# Patient Record
Sex: MALE | Race: WHITE | Employment: OTHER | URBAN - METROPOLITAN AREA
[De-identification: names, ages, dates, MRNs, and addresses within clinical notes are randomized per-mention and may not be internally consistent; named-entity substitution may affect disease eponyms.]

---

## 2017-03-09 ENCOUNTER — HOSPITAL ENCOUNTER (EMERGENCY)
Facility: CLINIC | Age: 82
Discharge: HOME | End: 2017-03-09
Attending: EMERGENCY MEDICINE | Admitting: EMERGENCY MEDICINE
Payer: MEDICARE

## 2017-03-09 VITALS
OXYGEN SATURATION: 95 % | BODY MASS INDEX: 28.85 KG/M2 | TEMPERATURE: 97 F | SYSTOLIC BLOOD PRESSURE: 135 MMHG | WEIGHT: 198.19 LBS | DIASTOLIC BLOOD PRESSURE: 80 MMHG | RESPIRATION RATE: 20 BRPM | HEART RATE: 80 BPM

## 2017-03-09 DIAGNOSIS — J01.90 ACUTE SINUSITIS WITH SYMPTOMS > 10 DAYS: ICD-10-CM

## 2017-03-09 DIAGNOSIS — J30.2 SEASONAL ALLERGIC RHINITIS, UNSPECIFIED ALLERGIC RHINITIS TRIGGER: ICD-10-CM

## 2017-03-09 PROCEDURE — 99283 EMERGENCY DEPT VISIT LOW MDM: CPT | Performed by: EMERGENCY MEDICINE

## 2017-03-09 PROCEDURE — 99282 EMERGENCY DEPT VISIT SF MDM: CPT

## 2017-03-09 RX ORDER — CETIRIZINE HYDROCHLORIDE 10 MG/1
10 TABLET ORAL EVERY EVENING
Qty: 30 TABLET | Refills: 1 | Status: SHIPPED | OUTPATIENT
Start: 2017-03-09 | End: 2017-05-04

## 2017-03-09 RX ORDER — AZITHROMYCIN 250 MG/1
TABLET, FILM COATED ORAL
Qty: 6 TABLET | Refills: 0 | Status: SHIPPED | OUTPATIENT
Start: 2017-03-09 | End: 2017-03-14

## 2017-03-09 ASSESSMENT — ENCOUNTER SYMPTOMS
COUGH: 1
SHORTNESS OF BREATH: 0
SINUS PRESSURE: 1

## 2017-03-09 NOTE — ED PROVIDER NOTES
"  History     Chief Complaint   Patient presents with     Cough     The history is provided by the patient.     Jerry Rodriguez is a 82 year old male who presents for evaluation of a cough, chills, runny nose, and shortness of breath that have been ongoing for the last several weeks.  Patient is had a cough is present producing clear discharge.  He says at times he feels \"wheezy\".  He has been having chills but is unsure what his temperatures actually been running.    I have reviewed the Medications, Allergies, Past Medical and Surgical History, and Social History in the [facility] system.    Past Medical History   Diagnosis Date     Anxiety state, unspecified      Hypertension      Hyponatremia      Inguinal hernia with obstruction, without mention of gangrene, unilateral or unspecified, (not specified as recurrent)      Strangulated right inguinal hernia years ago     Inguinal hernia without mention of obstruction or gangrene, unilateral or unspecified, (not specified as recurrent) 11/07/2000     Left inguinal hernia, sliding and not incarcerated     Other joint derangement, not elsewhere classified, forearm 1980     traumatic fracture     Pathologic fracture of neck of femur (H)      Surgery for hip fracture     Stricture and stenosis of esophagus 2004       Past Surgical History   Procedure Laterality Date     Hc repair incisional hernia,reducible  11/10/2000     Hernia Repair, Incisional, Unilateral, Left inguinal hernia and umbilical hernia     Hc colonoscopy thru stoma, diagnostic  11/15/2000     Recurrent abdominal pain     C osteotomy hip/femur  1980     traumatic fracture right hip     Hc colonoscopy w/wo brush/wash  07/19/2004     Hc ugi endoscopy diag w or w/o brush/wash  07/19/2004     Hc ugi endoscopy diag w or w/o brush/wash  05/24/2005     Peptic stricture of esophagus, dilated to 18 mm. Erosive reflux esophagitis. Hiatal hernia. Erosive duodenitis.     Laparoscopic appendectomy  09/15/2005     Hc ugi " endoscopy, simple exam  02/20/07     Colonoscopy  10/17/2007     Repeat Colonoscopy in 5 years for surveillance.      ugi endoscopy, simple exam  12/02/08     Schiatzky's ring, dialated, PPI indefinitely     Hc ugi endoscopy, simple exam  12/15/2009     benign stricture with dialation     Esophagoscopy, gastroscopy, duodenoscopy (egd), combined  1/4/2011     COMBINED ESOPHAGOSCOPY, GASTROSCOPY, DUODENOSCOPY (EGD), ESOPHAGEAL DILATION BALLOON LESS THAN 30MM performed by ELEN BUSBY at  GI     Esophagoscopy, gastroscopy, duodenoscopy (egd), combined N/A 9/25/2015     Procedure: COMBINED ESOPHAGOSCOPY, GASTROSCOPY, DUODENOSCOPY (EGD), BIOPSY SINGLE OR MULTIPLE;  Surgeon: Markell Lawson MD;  Location:  GI       Family History   Problem Relation Age of Onset     CANCER Sister      brain tumor     DIABETES Sister      Alzheimer Disease Sister        Social History   Substance Use Topics     Smoking status: Current Every Day Smoker     Packs/day: 1.00     Years: 52.00     Types: Cigarettes     Smokeless tobacco: Current User     Types: Chew      Comment: occasionally/ 1.5tin qwk     Alcohol use No      Comment: quit 7-1980        Immunization History   Administered Date(s) Administered     Influenza (H1N1) 01/11/2010     Influenza (High Dose) 3 valent vaccine 10/14/2011, 09/04/2015, 10/13/2016     Influenza (IIV3) 11/25/2000, 10/31/2001, 11/04/2002, 01/31/2005, 10/17/2005, 11/13/2006, 10/18/2007, 10/16/2008, 09/22/2009, 10/19/2010     Pneumococcal (PCV 13) 10/13/2016     Pneumococcal 23 valent 11/01/2001, 10/17/2005     TD (ADULT, 7+) 10/28/1999, 10/25/2000, 02/04/2010          Allergies   Allergen Reactions     No Known Drug Allergies        Current Outpatient Prescriptions   Medication Sig Dispense Refill     Ibuprofen (ADVIL PO) Take 400 mg by mouth       azithromycin (ZITHROMAX Z-LOUISE) 250 MG tablet Two tablets on the first day, then one tablet daily for the next 4 days 6 tablet 0     cetirizine (ZYRTEC) 10  MG tablet Take 1 tablet (10 mg) by mouth every evening 30 tablet 1     albuterol (PROAIR HFA, PROVENTIL HFA, VENTOLIN HFA) 108 (90 BASE) MCG/ACT inhaler Inhale 2 puffs into the lungs every 6 hours as needed for shortness of breath / dyspnea or wheezing 1 Inhaler 1     omeprazole (PRILOSEC) 20 MG capsule Take 1 capsule (20 mg) by mouth daily 90 capsule 3     busPIRone (BUSPAR) 5 MG tablet Take 1 tablet (5 mg) by mouth 2 times daily 180 tablet 1     amLODIPine (NORVASC) 5 MG tablet Take 1 tablet (5 mg) by mouth daily 90 tablet 3     multivitamin, therapeutic with minerals (MULTI-VITAMIN) TABS Take 1 tablet by mouth daily       meclizine (ANTIVERT) 25 MG tablet Take 1 tablet (25 mg) by mouth 3 times daily as needed for dizziness 30 tablet 12     TIMOLOL 0.25 % OP SOLN as dir  0     ASPIRIN 81 MG OR TABS ONE DAILY       triamcinolone (KENALOG) 0.1 % cream Apply thin layer to affected areas two times a day as needed for itch. Maximum 2 weeks use 80 g 0      Review of Systems   HENT: Positive for congestion, postnasal drip and sinus pressure.    Respiratory: Positive for cough. Negative for shortness of breath.    All other systems reviewed and are negative.      Physical Exam   BP: 135/80  Pulse: 81  Temp: 97  F (36.1  C)  Resp: 18  Weight: 89.9 kg (198 lb 3 oz)  SpO2: 95 %  Physical Exam   Constitutional: No distress.   HENT:   Head: Atraumatic.   Nose: Mucosal edema and rhinorrhea present. Right sinus exhibits maxillary sinus tenderness. Left sinus exhibits maxillary sinus tenderness.   Mouth/Throat: Oropharynx is clear and moist.       Eyes: Pupils are equal, round, and reactive to light.   Neck: Normal range of motion. Neck supple.   Cardiovascular: Normal rate, regular rhythm and intact distal pulses.    Murmur heard.   Systolic murmur is present with a grade of 2/6   Pulmonary/Chest: Effort normal and breath sounds normal.   Abdominal: Soft. Bowel sounds are normal.   Skin: He is not diaphoretic.   Nursing note and  vitals reviewed.      ED Course     ED Course     Procedures                 Labs Ordered and Resulted from Time of ED Arrival Up to the Time of Departure from the ED - No data to display    Assessments & Plan (with Medical Decision Making)  Jerry Rodriguez is a 82 year old male who presents for evaluation of a cough, chills, runny nose, and shortness of breath that have been ongoing for the last several weeks.  On examination he has significant edema of the turbinates with copious clear discharge.  He has maxillary sinus tenderness to percussion.  He has a 2/6 systolic ejection murmur.  The remainder of his exam is unremarkable.  These findings are consistent with an acute sinusitis greater than 10 days and allergic rhinitis.  For the sinusitis sinusitis will start him on azithromycin Z-Louise to clear the infection and for the allergic rhinitis we'll start him on cetirizine daily.  Patient is in agreement with this plan and is suitable for discharge.       I have reviewed the nursing notes.    I have reviewed the findings, diagnosis, plan and need for follow up with the patient.    New Prescriptions    AZITHROMYCIN (ZITHROMAX Z-LOUISE) 250 MG TABLET    Two tablets on the first day, then one tablet daily for the next 4 days    CETIRIZINE (ZYRTEC) 10 MG TABLET    Take 1 tablet (10 mg) by mouth every evening       Final diagnoses:   Acute sinusitis with symptoms > 10 days   Seasonal allergic rhinitis, unspecified allergic rhinitis trigger       3/9/2017   [facility] EMERGENCY DEPARTMENT     Don Mcneill MD  03/09/17 [id number]

## 2017-03-09 NOTE — ED AVS SNAPSHOT
[facility] Emergency Department    [address] [place] [address]    Phone:  [phone]    Fax:  [phone]                                       Jerry Rodriguez   MRN: [id number]    Department:  [facility] Emergency Department   Date of Visit:  3/9/2017           After Visit Summary Signature Page     I have received my discharge instructions, and my questions have been answered. I have discussed any challenges I see with this plan with the nurse or doctor.    ..........................................................................................................................................  Patient/Patient Representative Signature      ..........................................................................................................................................  Patient Representative Print Name and Relationship to Patient    ..................................................               ................................................  Date                                            Time    ..........................................................................................................................................  Reviewed by Signature/Title    ...................................................              ..............................................  Date                                                            Time

## 2017-03-09 NOTE — ED AVS SNAPSHOT
[facility] Emergency Department    [address]     [place] [address]    Phone:  [phone]    Fax:  [phone]                                       Jerry Rodriguez   MRN: [id number]    Department:  [facility] Emergency Department   Date of Visit:  3/9/2017           Patient Information     Date Of Birth          1935        Your diagnoses for this visit were:     Acute sinusitis with symptoms > 10 days     Seasonal allergic rhinitis, unspecified allergic rhinitis trigger        You were seen by Don Mcneill MD.      Follow-up Information     Follow up with Roberta Beaver MD.    Specialty:  Family Practice    Why:  As needed    Contact information:    [facility]   [address] [address]  [phone]          Discharge Instructions         Acute Bacterial Rhinosinusitis (ABRS)  Acute bacterial rhinosinusitis (ABRS) is an infection of your nasal cavity and sinuses. It s caused by bacteria. Acute means that you ve had symptoms for less than 12 weeks.  Understanding your sinuses  The nasal cavity is the large air-filled space behind your nose. The sinuses are a group of spaces formed by the bones of your face. They connect with your nasal cavity. ABRS causes the tissue lining these spaces to become inflamed. Mucus may not drain normally. This leads to facial pain and other symptoms.  What causes ABRS?  ABRS most often follows an upper respiratory infection caused by a virus. Bacteria then infect the lining of your nasal cavity and sinuses. But you can also get ABRS if you have:    Nasal allergies    Long-term nasal swelling and congestion not caused by allergies    Blockage in the nose  Symptoms of ABRS  The symptoms of ABRS may be different for each person, and can include:    Nasal congestion    Runny nose    Fluid draining from the nose down the throat (postnasal drip)    Headache    Cough    Pain in the sinuses    Thick, colored fluid  from the nose (mucus)    Fever  Diagnosing ABRS  ABRS may be diagnosed if you ve had an upper respiratory infection like a cold and cough for longer than 10 to 14 days. Your health care provider will ask about your symptoms and your medical history. The provider will check your vital signs, including your temperature. You ll have a physical exam. The health care provider will check your ears, nose, and throat. You likely won t need any tests. If ABRS comes back, you may have a culture or other tests.  Treatment for ABRS  Treatment may include:    Antibiotic medicine. This is for symptoms that last for at least 10 to 14 days.    Nasal corticosteroid medicine. Drops or spray used in the nose can lessen swelling and congestion.    Over-the-counter pain medicine. This is to lessen sinus pain and pressure.    Nasal decongestant medicine. Spray or drops may help to lessen congestion. Do not use them for more than a few days.    Salt wash (saline irrigation). This can help to loosen mucus.  Possible complications of ABRS  ABRS may come back or become long-term (chronic).  In rare cases, ABRS may cause complications such as:     Inflamed tissue around the brain and spinal cord (meningitis)    Inflamed tissue around the eyes (orbital cellulitis)    Inflamed bones around the sinuses (osteitis)  These problems may need to be treated in a hospital with intravenous (IV) antibiotic medicine or surgery.  When to call the health care provider  Call your health care provider if you have any of the following:    Symptoms that don t get better, or get worse    Symptoms that don t get better after 3 to 5 days on antibiotics    Trouble seeing    Swelling around your eyes    Confusion or trouble staying awake        4075-5372 [employer]. [address] [place] [address], [place] [address]. All rights reserved. This information is not intended as a substitute for professional medical care. Always follow your healthcare professional's  instructions.          24 Hour Appointment Hotline       To make an appointment at any [facility], call [phone] ([phone]). If you don't have a family doctor or clinic, we will help you find one. [facility] clinics are conveniently located to serve the needs of you and your family.             Review of your medicines      START taking        Dose / Directions Last dose taken    azithromycin 250 MG tablet   Commonly known as:  ZITHROMAX Z-LOUISE   Quantity:  6 tablet        Two tablets on the first day, then one tablet daily for the next 4 days   Refills:  0        cetirizine 10 MG tablet   Commonly known as:  zyrTEC   Dose:  10 mg   Quantity:  30 tablet        Take 1 tablet (10 mg) by mouth every evening   Refills:  1          Our records show that you are taking the medicines listed below. If these are incorrect, please call your family doctor or clinic.        Dose / Directions Last dose taken    ADVIL PO   Dose:  400 mg        Take 400 mg by mouth   Refills:  0        albuterol 108 (90 BASE) MCG/ACT Inhaler   Commonly known as:  PROAIR HFA/PROVENTIL HFA/VENTOLIN HFA   Dose:  2 puff   Quantity:  1 Inhaler        Inhale 2 puffs into the lungs every 6 hours as needed for shortness of breath / dyspnea or wheezing   Refills:  1        amLODIPine 5 MG tablet   Commonly known as:  NORVASC   Dose:  5 mg   Quantity:  90 tablet        Take 1 tablet (5 mg) by mouth daily   Refills:  3        aspirin 81 MG tablet        ONE DAILY   Refills:  0        busPIRone 5 MG tablet   Commonly known as:  BUSPAR   Dose:  5 mg   Quantity:  180 tablet        Take 1 tablet (5 mg) by mouth 2 times daily   Refills:  1        meclizine 25 MG tablet   Commonly known as:  ANTIVERT   Dose:  25 mg   Quantity:  30 tablet        Take 1 tablet (25 mg) by mouth 3 times daily as needed for dizziness   Refills:  12        Multi-vitamin Tabs tablet   Dose:  1 tablet        Take 1 tablet by mouth daily   Refills:  0        omeprazole 20 MG  "CR capsule   Commonly known as:  priLOSEC   Dose:  20 mg   Quantity:  90 capsule        Take 1 capsule (20 mg) by mouth daily   Refills:  3        timolol hemihydrate 0.25 % Soln ophthalmic solution   Commonly known as:  BETIMOL        as dir   Refills:  0        triamcinolone 0.1 % cream   Commonly known as:  KENALOG   Quantity:  80 g        Apply thin layer to affected areas two times a day as needed for itch. Maximum 2 weeks use   Refills:  0                Prescriptions were sent or printed at these locations (2 Prescriptions)                   [facility] [address] [place], [place] [address] [address] [address]    Telephone:  [phone]   Fax:  [phone]   Hours:                  E-Prescribed (2 of 2)         azithromycin (ZITHROMAX Z-LOUISE) 250 MG tablet               cetirizine (ZYRTEC) 10 MG tablet                Orders Needing Specimen Collection     None      Pending Results     No orders found from 3/7/2017 to 3/10/2017.            Pending Culture Results     No orders found from 3/7/2017 to 3/10/2017.            Thank you for choosing [facility]       Thank you for choosing [facility] for your care. Our goal is always to provide you with excellent care. Hearing back from our patients is one way we can continue to improve our services. Please take a few minutes to complete the written survey that you may receive in the mail after you visit with us. Thank you!        [employer] Information     [employer] lets you send messages to your doctor, view your test results, renew your prescriptions, schedule appointments and more. To sign up, go to [email or web address] . Click on \"Log in\" on the left side of the screen, which will take you to the Welcome page. Then click on \"Sign up Now\" on the right side of the page.     You will be asked to enter the access code listed below, as well as some personal information. Please follow the directions to create your username and password.   "   Your access code is: [email or web address]  Expires: 2017 10:29 AM     Your access code will  in 90 days. If you need help or a new code, please call your [facility] clinic or [phone].        Care EveryWhere ID     This is your Care EveryWhere ID. This could be used by other organizations to access your [facility] medical records  [id number]        After Visit Summary       This is your record. Keep this with you and show to your community pharmacist(s) and doctor(s) at your next visit.

## 2017-03-09 NOTE — DISCHARGE INSTRUCTIONS
Acute Bacterial Rhinosinusitis (ABRS)  Acute bacterial rhinosinusitis (ABRS) is an infection of your nasal cavity and sinuses. It s caused by bacteria. Acute means that you ve had symptoms for less than 12 weeks.  Understanding your sinuses  The nasal cavity is the large air-filled space behind your nose. The sinuses are a group of spaces formed by the bones of your face. They connect with your nasal cavity. ABRS causes the tissue lining these spaces to become inflamed. Mucus may not drain normally. This leads to facial pain and other symptoms.  What causes ABRS?  ABRS most often follows an upper respiratory infection caused by a virus. Bacteria then infect the lining of your nasal cavity and sinuses. But you can also get ABRS if you have:    Nasal allergies    Long-term nasal swelling and congestion not caused by allergies    Blockage in the nose  Symptoms of ABRS  The symptoms of ABRS may be different for each person, and can include:    Nasal congestion    Runny nose    Fluid draining from the nose down the throat (postnasal drip)    Headache    Cough    Pain in the sinuses    Thick, colored fluid from the nose (mucus)    Fever  Diagnosing ABRS  ABRS may be diagnosed if you ve had an upper respiratory infection like a cold and cough for longer than 10 to 14 days. Your health care provider will ask about your symptoms and your medical history. The provider will check your vital signs, including your temperature. You ll have a physical exam. The health care provider will check your ears, nose, and throat. You likely won t need any tests. If ABRS comes back, you may have a culture or other tests.  Treatment for ABRS  Treatment may include:    Antibiotic medicine. This is for symptoms that last for at least 10 to 14 days.    Nasal corticosteroid medicine. Drops or spray used in the nose can lessen swelling and congestion.    Over-the-counter pain medicine. This is to lessen sinus pain and pressure.    Nasal  decongestant medicine. Spray or drops may help to lessen congestion. Do not use them for more than a few days.    Salt wash (saline irrigation). This can help to loosen mucus.  Possible complications of ABRS  ABRS may come back or become long-term (chronic).  In rare cases, ABRS may cause complications such as:     Inflamed tissue around the brain and spinal cord (meningitis)    Inflamed tissue around the eyes (orbital cellulitis)    Inflamed bones around the sinuses (osteitis)  These problems may need to be treated in a hospital with intravenous (IV) antibiotic medicine or surgery.  When to call the health care provider  Call your health care provider if you have any of the following:    Symptoms that don t get better, or get worse    Symptoms that don t get better after 3 to 5 days on antibiotics    Trouble seeing    Swelling around your eyes    Confusion or trouble staying awake        [id number] [employer]. [address] [place] [address]. All rights reserved. This information is not intended as a substitute for professional medical care. Always follow your healthcare professional's instructions.

## 2017-03-16 DIAGNOSIS — R05.9 COUGH: ICD-10-CM

## 2017-03-16 RX ORDER — ALBUTEROL SULFATE 90 UG/1
2 AEROSOL, METERED RESPIRATORY (INHALATION) EVERY 6 HOURS PRN
Qty: 18 G | Refills: 0 | Status: SHIPPED | OUTPATIENT
Start: 2017-03-16 | End: 2017-05-05

## 2017-03-16 NOTE — TELEPHONE ENCOUNTER
Routing refill request to provider for review/approval because:  Drug not on the [facility] refill protocol for associated diagnosis.   Patient seen in ED 03/09/2017, should be seen for hospital follow up.     Mary Hook RN

## 2017-03-16 NOTE — TELEPHONE ENCOUNTER
Ventolin 108 mcg       Last Written Prescription Date: 11/30/2016  Last Fill Quantity: 1, # refills: 1    Last Office Visit with G, P or [facility] prescribing provider:  11/30/2016   Future Office Visit:       Date of Last Asthma Action Plan Letter:   There are no preventive care reminders to display for this patient.   Asthma Control Test: No flowsheet data found.    Date of Last Spirometry Test:   No results found for this or any previous visit.

## 2017-03-16 NOTE — TELEPHONE ENCOUNTER
Pt wants this called in ASAP. He would like to pick this up ASAP.  Thank you,  Kennedi Bronson- Pt Rep.

## 2017-03-27 NOTE — PROGRESS NOTES
SUBJECTIVE:                                                    Jerry Rodriguez is a 82 year old male who presents to clinic today for the following health issues:      HPI    GERD/Heartburn     Onset: 2 months    Description:     Burning in chest: no     Intensity: mild    Progression of Symptoms: same and intermittent    Accompanying Signs & Symptoms:  Does it feel like food gets stuck: YES  Nausea: no  Vomiting (bloody?): no  Abdominal Pain: YES  Black-Tarry stools: no:  Bloody stools: no   History:   Previous ulcers: no    Precipitating factors:   Caffeine use: YES  Alcohol use: no  NSAID/Aspirin use: YES  Tobacco use: no  Worse with no particular food or drink.    Alleviating factors:  nothing         Therapies Tried and outcome:none    GI: The patient reports pain in his abdominal and chest region for the past 2 months thinking it is related to his GERD. He reports pain only when he is lifting. Bothering only when he lifts. Denies any blood or changes in stools.    Problem list and histories reviewed & adjusted, as indicated.  Additional history: as documented    Patient Active Problem List   Diagnosis     Other atopic dermatitis and related conditions     Chronic rhinitis     Generalized osteoarthrosis, unspecified site     Primary localized osteoarthrosis of shoulder region     Stricture and stenosis of esophagus     Anxiety state     HTN (hypertension)     Tobacco use disorder     CARDIOVASCULAR SCREENING; LDL GOAL LESS THAN 130     Hyponatremia     Degenerative arthritis of finger     Advanced directives, counseling/discussion     Hammer toe     Corn     NOEMI positive     Bullous dermatitis     HTN, goal below 140/90     Vertigo     Adenomatous polyp of colon     Lumbosacral neuritis - moderate at L4/5     Degeneration of lumbar intervertebral disc     Past Surgical History:   Procedure Laterality Date     C OSTEOTOMY HIP/FEMUR  1980    traumatic fracture right hip     COLONOSCOPY  10/17/2007    Repeat  Colonoscopy in 5 years for surveillance.     ESOPHAGOSCOPY, GASTROSCOPY, DUODENOSCOPY (EGD), COMBINED  1/4/2011    COMBINED ESOPHAGOSCOPY, GASTROSCOPY, DUODENOSCOPY (EGD), ESOPHAGEAL DILATION BALLOON LESS THAN 30MM performed by ELEN BUSBY at  GI     ESOPHAGOSCOPY, GASTROSCOPY, DUODENOSCOPY (EGD), COMBINED N/A 9/25/2015    Procedure: COMBINED ESOPHAGOSCOPY, GASTROSCOPY, DUODENOSCOPY (EGD), BIOPSY SINGLE OR MULTIPLE;  Surgeon: Markell Lawson MD;  Location:  GI     HC COLONOSCOPY THRU STOMA, DIAGNOSTIC  11/15/2000    Recurrent abdominal pain     HC COLONOSCOPY W/WO BRUSH/WASH  07/19/2004     HC REPAIR INCISIONAL HERNIA,REDUCIBLE  11/10/2000    Hernia Repair, Incisional, Unilateral, Left inguinal hernia and umbilical hernia     HC UGI ENDOSCOPY DIAG W OR W/O BRUSH/WASH  07/19/2004     HC UGI ENDOSCOPY DIAG W OR W/O BRUSH/WASH  05/24/2005    Peptic stricture of esophagus, dilated to 18 mm. Erosive reflux esophagitis. Hiatal hernia. Erosive duodenitis.      UGI ENDOSCOPY, SIMPLE EXAM  02/20/07     HC UGI ENDOSCOPY, SIMPLE EXAM  12/02/08    Schiatzky's ring, dialated, PPI indefinitely     HC UGI ENDOSCOPY, SIMPLE EXAM  12/15/2009    benign stricture with dialation     LAPAROSCOPIC APPENDECTOMY  09/15/2005       Social History   Substance Use Topics     Smoking status: Current Every Day Smoker     Packs/day: 1.00     Years: 52.00     Types: Cigarettes     Smokeless tobacco: Current User     Types: Chew      Comment: occasionally/ 1.5tin qwk     Alcohol use No      Comment: quit 7-1980     Family History   Problem Relation Age of Onset     CANCER Sister      brain tumor     DIABETES Sister      Alzheimer Disease Sister            ROS:  Constitutional, HEENT, cardiovascular, pulmonary, GI, , musculoskeletal, neuro, skin, endocrine and psych systems are negative, except as in HPI or otherwise noted     This document serves as a record of the services and decisions personally performed and made by Roberta Beaver MD.  "It was created on her behalf by Ihsan Canas , a trained medical scribe. The creation of this document is based the provider's statements to the medical scribe.  Ihsan Canas, March 29, 2017 11:32 AM     OBJECTIVE:                                                    /76 (BP Location: Right arm, Patient Position: Chair, Cuff Size: Adult Regular)  Pulse 74  Temp 98.4  F (36.9  C) (Temporal)  Resp 18  Ht 5' 9.5\" (1.765 m)  Wt 199 lb (90.3 kg)  SpO2 98%  BMI 28.97 kg/m2  Body mass index is 28.97 kg/(m^2).   GENERAL: healthy, alert, well nourished, well hydrated, no distress  ABDOMEN: soft, no tenderness, no  hepatosplenomegaly, no masses, normal bowel sounds  MS: abdominal pain during muscle tensing  SKIN: no suspicious lesions, no rashes  PSYCH: Alert and oriented times 3; speech- coherent , normal rate and volume; able to articulate logical thoughts, able to abstract reason, no tangential thoughts, no hallucinations or delusions, affect- normal    No results found for this or any previous visit (from the past 24 hour(s)).     ASSESSMENT/PLAN:                                                        ICD-10-CM    1. Musculoskeletal chest pain R07.89    2. Essential hypertension I10 amLODIPine (NORVASC) 5 MG tablet   3. Anxiety state F41.1 busPIRone (BUSPAR) 5 MG tablet     Offered PT if he desires, but doing work around the yard by choice and will back off and do as he tolerates.  Declines intervention today.      Patient Instructions   -Your pain is likely due to your muscles being overused. Physical therapy and physical activity can help reduce your symptoms. Rest can also help alleviate your pain.    The information in this document, created by the medical scribe for me, accurately reflects the services I personally performed and the decisions made by me. I have reviewed and approved this document for accuracy.   MD Roberta Burciaga MD, MD  [facility]  "

## 2017-03-29 ENCOUNTER — OFFICE VISIT (OUTPATIENT)
Dept: FAMILY MEDICINE | Facility: OTHER | Age: 82
End: 2017-03-29
Payer: COMMERCIAL

## 2017-03-29 VITALS
DIASTOLIC BLOOD PRESSURE: 76 MMHG | HEIGHT: 70 IN | HEART RATE: 74 BPM | TEMPERATURE: 98.4 F | BODY MASS INDEX: 28.49 KG/M2 | WEIGHT: 199 LBS | SYSTOLIC BLOOD PRESSURE: 118 MMHG | RESPIRATION RATE: 18 BRPM | OXYGEN SATURATION: 98 %

## 2017-03-29 DIAGNOSIS — F41.1 ANXIETY STATE: ICD-10-CM

## 2017-03-29 DIAGNOSIS — I10 ESSENTIAL HYPERTENSION: ICD-10-CM

## 2017-03-29 DIAGNOSIS — R07.89 MUSCULOSKELETAL CHEST PAIN: Primary | ICD-10-CM

## 2017-03-29 PROCEDURE — 99214 OFFICE O/P EST MOD 30 MIN: CPT | Performed by: FAMILY MEDICINE

## 2017-03-29 RX ORDER — TIMOLOL MALEATE 5 MG/ML
1 SOLUTION/ DROPS OPHTHALMIC 2 TIMES DAILY
Status: ON HOLD | COMMUNITY
Start: 2017-03-25 | End: 2020-05-23

## 2017-03-29 RX ORDER — AMLODIPINE BESYLATE 5 MG/1
5 TABLET ORAL DAILY
Qty: 90 TABLET | Refills: 3 | Status: SHIPPED | OUTPATIENT
Start: 2017-03-29 | End: 2018-05-26

## 2017-03-29 RX ORDER — BUSPIRONE HYDROCHLORIDE 5 MG/1
5 TABLET ORAL 2 TIMES DAILY
Qty: 180 TABLET | Refills: 1 | Status: SHIPPED | OUTPATIENT
Start: 2017-03-29 | End: 2017-10-09

## 2017-03-29 ASSESSMENT — PAIN SCALES - GENERAL: PAINLEVEL: NO PAIN (0)

## 2017-03-29 NOTE — PATIENT INSTRUCTIONS
-Your pain is likely due to your muscles being overused. Physical therapy and physical activity can help reduce your symptoms. Rest can also help alleviate your pain.

## 2017-03-29 NOTE — MR AVS SNAPSHOT
"              After Visit Summary   3/29/2017    Jerry Rodriguez    MRN: [id number]           Patient Information     Date Of Birth          1935        Visit Information        Provider Department      3/29/2017 11:00 AM Roberta Beaver MD [facility]        Today's Diagnoses     Musculoskeletal chest pain    -  1    Essential hypertension        Anxiety state          Care Instructions    -Your pain is likely due to your muscles being overused. Physical therapy and physical activity can help reduce your symptoms. Rest can also help alleviate your pain.        Follow-ups after your visit        Who to contact     If you have questions or need follow up information about today's clinic visit or your schedule please contact [facility] directly at [phone].  Normal or non-critical lab and imaging results will be communicated to you by Melodigramhart, letter or phone within 4 business days after the clinic has received the results. If you do not hear from us within 7 days, please contact the clinic through Melodigramhart or phone. If you have a critical or abnormal lab result, we will notify you by phone as soon as possible.  Submit refill requests through FatRedC[employer] or call your pharmacy and they will forward the refill request to us. Please allow 3 business days for your refill to be completed.          Additional Information About Your Visit        MyChart Information     FatRedCouch lets you send messages to your doctor, view your test results, renew your prescriptions, schedule appointments and more. To sign up, go to [email or web address] . Click on \"Log in\" on the left side of the screen, which will take you to the Welcome page. Then click on \"Sign up Now\" on the right side of the page.     You will be asked to enter the access code listed below, as well as some personal information. Please follow the directions to create your username and password.     Your access code is: " [email or web address]  Expires: 2017 11:29 AM     Your access code will  in 90 days. If you need help or a new code, please call your [place] clinic or [phone].        Care EveryWhere ID     This is your Care EveryWhere ID. This could be used by other organizations to access your [place] medical records  [id number]        Your Vitals Were     Pulse Temperature Respirations Height Pulse Oximetry BMI (Body Mass Index)    74 98.4  F (36.9  C) (Temporal) 18 5' 9.5\" (1.765 m) 98% 28.97 kg/m2       Blood Pressure from Last 3 Encounters:   17 118/76   17 135/80   16 122/78    Weight from Last 3 Encounters:   17 199 lb (90.3 kg)   17 198 lb 3 oz (89.9 kg)   16 201 lb (91.2 kg)              Today, you had the following     No orders found for display         Where to get your medicines      These medications were sent to [facility] [address] [place] [id number]     Phone:  [phone]     amLODIPine 5 MG tablet    busPIRone 5 MG tablet          Primary Care Provider Office Phone # Fax #    Roberta Beaver [phone]       [facility]  [address] [facility] [address]        Thank you!     Thank you for choosing [facility]  for your care. Our goal is always to provide you with excellent care. Hearing back from our patients is one way we can continue to improve our services. Please take a few minutes to complete the written survey that you may receive in the mail after your visit with us. Thank you!             Your Updated Medication List - Protect others around you: Learn how to safely use, store and throw away your medicines at www.disposemymeds.org.          This list is accurate as of: 3/29/17  1:11 PM.  Always use your most recent med list.                   Brand Name Dispense Instructions for use    ADVIL PO      Take 400 mg by mouth       albuterol 108 (90 BASE) " MCG/ACT Inhaler    VENTOLIN HFA    18 g    Inhale 2 puffs into the lungs every 6 hours as needed for shortness of breath / dyspnea or wheezing DUE for follow up       amLODIPine 5 MG tablet    NORVASC    90 tablet    Take 1 tablet (5 mg) by mouth daily       aspirin 81 MG tablet      ONE DAILY       busPIRone 5 MG tablet    BUSPAR    180 tablet    Take 1 tablet (5 mg) by mouth 2 times daily       cetirizine 10 MG tablet    zyrTEC    30 tablet    Take 1 tablet (10 mg) by mouth every evening       meclizine 25 MG tablet    ANTIVERT    30 tablet    Take 1 tablet (25 mg) by mouth 3 times daily as needed for dizziness       Multi-vitamin Tabs tablet      Take 1 tablet by mouth daily       omeprazole 20 MG CR capsule    priLOSEC    90 capsule    Take 1 capsule (20 mg) by mouth daily       timolol 0.5 % ophthalmic solution    TIMOPTIC     Apply 1 drop to eye 2 times daily       timolol hemihydrate 0.25 % Soln ophthalmic solution    BETIMOL     Reported on 3/29/2017       triamcinolone 0.1 % cream    KENALOG    80 g    Apply thin layer to affected areas two times a day as needed for itch. Maximum 2 weeks use

## 2017-03-31 ENCOUNTER — TELEPHONE (OUTPATIENT)
Dept: FAMILY MEDICINE | Facility: OTHER | Age: 82
End: 2017-03-31

## 2017-03-31 NOTE — TELEPHONE ENCOUNTER
[facility] pharmacy in [place] calling due to patient stated that they were also prescribed another medication at last appointment. Tried reaching team a few different times but was unsuccessful.  Return number [phone]    Shakila De Luna  Reception/ Scheduling

## 2017-03-31 NOTE — TELEPHONE ENCOUNTER
Spoke to Cub and informed her of what we had for patient and what was sent and I didn't see anything else we were missing.

## 2017-05-04 DIAGNOSIS — J30.2 SEASONAL ALLERGIC RHINITIS, UNSPECIFIED ALLERGIC RHINITIS TRIGGER: Primary | ICD-10-CM

## 2017-05-04 NOTE — TELEPHONE ENCOUNTER
cetirizine (ZYRTEC) 10 MG tablet      Last Written Prescription Date: 3/9/17  Last Fill Quantity: 30,  # refills: 1   Last Office Visit with [facility], [facility] or [facility] prescribing provider: 3/29/17

## 2017-05-05 DIAGNOSIS — R05.9 COUGH: ICD-10-CM

## 2017-05-05 RX ORDER — CETIRIZINE HYDROCHLORIDE 10 MG/1
10 TABLET ORAL EVERY EVENING
Qty: 30 TABLET | Refills: 9 | Status: SHIPPED | OUTPATIENT
Start: 2017-05-05 | End: 2020-03-04

## 2017-05-05 NOTE — TELEPHONE ENCOUNTER
Routing refill request to provider for review/approval because:  No diagnosis associated with medication.    Sabiha Betancourt, RN, BSN

## 2017-05-05 NOTE — TELEPHONE ENCOUNTER
albuterol       Last Written Prescription Date: 03/16/17  Last Fill Quantity: 18g, # refills: 0    Last Office Visit with G, P or [facility] prescribing provider:  03/29/17   Future Office Visit:   NA    Date of Last Asthma Action Plan Letter:   There are no preventive care reminders to display for this patient.   Asthma Control Test: No flowsheet data found.    Date of Last Spirometry Test:   No results found for this or any previous visit.

## 2017-05-08 RX ORDER — ALBUTEROL SULFATE 90 UG/1
AEROSOL, METERED RESPIRATORY (INHALATION)
Qty: 18 G | Refills: 0 | Status: SHIPPED | OUTPATIENT
Start: 2017-05-08 | End: 2017-06-08

## 2017-05-08 NOTE — TELEPHONE ENCOUNTER
Routing refill request to provider for review/approval because:  Unable to approve for cough.     Samantha Burgess, RN, BSN

## 2017-05-16 ENCOUNTER — ALLIED HEALTH/NURSE VISIT (OUTPATIENT)
Dept: FAMILY MEDICINE | Facility: OTHER | Age: 82
End: 2017-05-16
Payer: COMMERCIAL

## 2017-05-16 DIAGNOSIS — H61.23 IMPACTED CERUMEN OF BOTH EARS: Primary | ICD-10-CM

## 2017-05-16 PROCEDURE — 99207 ZZC NO CHARGE NURSE ONLY: CPT

## 2017-05-16 NOTE — MR AVS SNAPSHOT
"              After Visit Summary   2017    Jerry Rodriguez    MRN: [id number]           Patient Information     Date Of Birth          1935        Visit Information        Provider Department      2017 10:00 AM BEAU DAVIS TEAM B, [facility] [place]        Today's Diagnoses     Impacted cerumen of both ears    -  1       Follow-ups after your visit        Who to contact     If you have questions or need follow up information about today's clinic visit or your schedule please contact [facility] directly at [phone].  Normal or non-critical lab and imaging results will be communicated to you by Nautalhart, letter or phone within 4 business days after the clinic has received the results. If you do not hear from us within 7 days, please contact the clinic through Nautalhart or phone. If you have a critical or abnormal lab result, we will notify you by phone as soon as possible.  Submit refill requests through [employer] or call your pharmacy and they will forward the refill request to us. Please allow 3 business days for your refill to be completed.          Additional Information About Your Visit        MyChart Information     [employer] lets you send messages to your doctor, view your test results, renew your prescriptions, schedule appointments and more. To sign up, go to [email or web address] . Click on \"Log in\" on the left side of the screen, which will take you to the Welcome page. Then click on \"Sign up Now\" on the right side of the page.     You will be asked to enter the access code listed below, as well as some personal information. Please follow the directions to create your username and password.     Your access code is: [email or web address]  Expires: 2017 11:29 AM     Your access code will  in 90 days. If you need help or a new code, please call your [facility] or [phone].        Care EveryWhere ID     This is your Care EveryWhere ID. This could be used by " other organizations to access your [place] medical records  [id number]         Blood Pressure from Last 3 Encounters:   03/29/17 118/76   03/09/17 135/80   11/30/16 122/78    Weight from Last 3 Encounters:   03/29/17 199 lb (90.3 kg)   03/09/17 198 lb 3 oz (89.9 kg)   11/30/16 201 lb (91.2 kg)              Today, you had the following     No orders found for display       Primary Care Provider Office Phone # Fax #    Roberta Bel Beaver [phone]       [facility]  [address] [employer] [address]        Thank you!     Thank you for choosing [facility]  for your care. Our goal is always to provide you with excellent care. Hearing back from our patients is one way we can continue to improve our services. Please take a few minutes to complete the written survey that you may receive in the mail after your visit with us. Thank you!             Your Updated Medication List - Protect others around you: Learn how to safely use, store and throw away your medicines at www.disposemymeds.org.          This list is accurate as of: 5/16/17 11:25 AM.  Always use your most recent med list.                   Brand Name Dispense Instructions for use    ADVIL PO      Take 400 mg by mouth       amLODIPine 5 MG tablet    NORVASC    90 tablet    Take 1 tablet (5 mg) by mouth daily       aspirin 81 MG tablet      ONE DAILY       busPIRone 5 MG tablet    BUSPAR    180 tablet    Take 1 tablet (5 mg) by mouth 2 times daily       cetirizine 10 MG tablet    zyrTEC    30 tablet    Take 1 tablet (10 mg) by mouth every evening       meclizine 25 MG tablet    ANTIVERT    30 tablet    Take 1 tablet (25 mg) by mouth 3 times daily as needed for dizziness       Multi-vitamin Tabs tablet      Take 1 tablet by mouth daily       omeprazole 20 MG CR capsule    priLOSEC    90 capsule    Take 1 capsule (20 mg) by mouth daily       timolol 0.5 % ophthalmic solution    TIMOPTIC     Apply 1 drop to eye 2  times daily       timolol hemihydrate 0.25 % Soln ophthalmic solution    BETIMOL     Reported on 3/29/2017       triamcinolone 0.1 % cream    KENALOG    80 g    Apply thin layer to affected areas two times a day as needed for itch. Maximum 2 weeks use       VENTOLIN  (90 BASE) MCG/ACT Inhaler   Generic drug:  albuterol     18 g    INHALE TWO PUFFS BY MOUTH EVERY SIX HOURS AS NEEDED FOR SHORTNESS OF BREATH OR WHEEZING. DUE FOR FOLLOW UP

## 2017-06-07 NOTE — PROGRESS NOTES
SUBJECTIVE:                                                    Jerry Rodriguez is a 82 year old male who presents to clinic today for the following health issues:      HPI    RESP/HENT: The patient reports that his cough symptoms have returned. He notes that his cough and sneezing worsened significantly after he ran out of his Flonase and reports no change when he was using his inhaler. He reports that his sinus symptoms and his cough were well controlled when he was using his Flonase and does not think that he needs an additional allergy medication.    Problem list and histories reviewed & adjusted, as indicated.  Additional history: as documented    Patient Active Problem List   Diagnosis     Other atopic dermatitis and related conditions     Chronic rhinitis     Generalized osteoarthrosis, unspecified site     Primary localized osteoarthrosis of shoulder region     Stricture and stenosis of esophagus     Anxiety state     HTN (hypertension)     Tobacco use disorder     CARDIOVASCULAR SCREENING; LDL GOAL LESS THAN 130     Hyponatremia     Degenerative arthritis of finger     Advanced directives, counseling/discussion     Hammer toe     Corn     NOEMI positive     Bullous dermatitis     HTN, goal below 140/90     Vertigo     Adenomatous polyp of colon     Lumbosacral neuritis - moderate at L4/5     Degeneration of lumbar intervertebral disc     Past Surgical History:   Procedure Laterality Date     C OSTEOTOMY HIP/FEMUR  1980    traumatic fracture right hip     COLONOSCOPY  10/17/2007    Repeat Colonoscopy in 5 years for surveillance.     ESOPHAGOSCOPY, GASTROSCOPY, DUODENOSCOPY (EGD), COMBINED  1/4/2011    COMBINED ESOPHAGOSCOPY, GASTROSCOPY, DUODENOSCOPY (EGD), ESOPHAGEAL DILATION BALLOON LESS THAN 30MM performed by ELEN BUSBY at  GI     ESOPHAGOSCOPY, GASTROSCOPY, DUODENOSCOPY (EGD), COMBINED N/A 9/25/2015    Procedure: COMBINED ESOPHAGOSCOPY, GASTROSCOPY, DUODENOSCOPY (EGD), BIOPSY SINGLE OR MULTIPLE;   Surgeon: Markell Lawson MD;  Location: [facility]     HC COLONOSCOPY THRU STOMA, DIAGNOSTIC  11/15/2000    Recurrent abdominal pain     HC COLONOSCOPY W/WO BRUSH/WASH  07/19/2004     HC REPAIR INCISIONAL HERNIA,REDUCIBLE  11/10/2000    Hernia Repair, Incisional, Unilateral, Left inguinal hernia and umbilical hernia     HC UGI ENDOSCOPY DIAG W OR W/O BRUSH/WASH  07/19/2004     HC UGI ENDOSCOPY DIAG W OR W/O BRUSH/WASH  05/24/2005    Peptic stricture of esophagus, dilated to 18 mm. Erosive reflux esophagitis. Hiatal hernia. Erosive duodenitis.     HC UGI ENDOSCOPY, SIMPLE EXAM  02/20/07     HC UGI ENDOSCOPY, SIMPLE EXAM  12/02/08    Schiatzky's ring, dialated, PPI indefinitely     HC UGI ENDOSCOPY, SIMPLE EXAM  12/15/2009    benign stricture with dialation     LAPAROSCOPIC APPENDECTOMY  09/15/2005       Social History   Substance Use Topics     Smoking status: Current Every Day Smoker     Packs/day: 1.00     Years: 52.00     Types: Cigarettes     Smokeless tobacco: Current User     Types: Chew      Comment: occasionally/ 1.5tin qwk     Alcohol use No      Comment: quit 7-1980     Family History   Problem Relation Age of Onset     CANCER Sister      brain tumor     DIABETES Sister      Alzheimer Disease Sister            ROS:  Constitutional, HEENT, cardiovascular, pulmonary, GI, , musculoskeletal, neuro, skin, endocrine and psych systems are negative, except as in HPI or otherwise noted     This document serves as a record of the services and decisions personally performed and made by Roberta Beaver MD. It was created on her behalf by Ihsan Canas , a trained medical scribe. The creation of this document is based the provider's statements to the medical scribe.  Ihsan Canas, June 12, 2017 4:10 PM     OBJECTIVE:                                                    There were no vitals taken for this visit.  There is no height or weight on file to calculate BMI.   GENERAL: healthy, alert, well nourished, well hydrated,  no distress  HENT: ear canals- normal; TMs- normal; Nose- mild pale boggy turbinates noted; Mouth- no ulcers, no lesions  RESP: mild wheezing noted bilaterally  CV: regular rates and rhythm, normal S1 S2, no S3 or S4 and no murmur, no click or rub -  SKIN: no suspicious lesions, no rashes  PSYCH: Alert and oriented times 3; speech- coherent , normal rate and volume; able to articulate logical thoughts, able to abstract reason, no tangential thoughts, no hallucinations or delusions, affect- normal    No results found for this or any previous visit (from the past 24 hour(s)).     ASSESSMENT/PLAN:                                                        ICD-10-CM    1. Cough R05      The patient reports that his cough, sinus, and sneezing symptoms were well controlled with Flonase and notes that they have worsened significantly after he ran out of his Flonase. I recommended that he continue using his nasal spray, and we can add an oral allergy medication if his symptoms are not well controlled with the Flonase alone.  - I was not able to review spirometry today due to computer issues. will change inhalers if the spirometry indicates.    Patient Instructions   Continue using flonase, we can add more allergy medication if your symptoms are not well controlled with the flonase alone.    The information in this document, created by the medical scribe for me, accurately reflects the services I personally performed and the decisions made by me. I have reviewed and approved this document for accuracy.   MD Roberta Burciaga MD, MD  [facility]

## 2017-06-08 ENCOUNTER — TELEPHONE (OUTPATIENT)
Dept: FAMILY MEDICINE | Facility: OTHER | Age: 82
End: 2017-06-08

## 2017-06-08 DIAGNOSIS — R05.9 COUGH: ICD-10-CM

## 2017-06-08 NOTE — TELEPHONE ENCOUNTER
VENTOLIN  (90 BASE) MCG/ACT Inhaler       Last Written Prescription Date: 5/8/17  Last Fill Quantity: 18g, # refills: 0    Last Office Visit with G, P or [facility] prescribing provider:  3/29/17   Future Office Visit:    Next 5 appointments (look out 90 days)     Jun 12, 2017 11:30 AM CDT   SHORT with Roberta Beaver MD   [facility]    [address] [place] [id number] [phone]                   Date of Last Asthma Action Plan Letter:   There are no preventive care reminders to display for this patient.   Asthma Control Test: No flowsheet data found.    Date of Last Spirometry Test:   No results found for this or any previous visit.

## 2017-06-09 RX ORDER — ALBUTEROL SULFATE 90 UG/1
AEROSOL, METERED RESPIRATORY (INHALATION)
Qty: 18 G | Refills: 0 | Status: SHIPPED | OUTPATIENT
Start: 2017-06-09 | End: 2017-08-10

## 2017-06-09 NOTE — TELEPHONE ENCOUNTER
Routing refill request to provider for review/approval because:  Renee given x1 and patient did not follow up, please advise  Patient needs to be seen because:  Due for follow up per Edman  Associated diagnosis (cough) suggests it may be indicated for acute use - unsure if medication is indicated for chronic use.    Ariana Srinivasan, RN, BSN

## 2017-06-09 NOTE — TELEPHONE ENCOUNTER
Will provide 1 last short refill but needs f/u for further refills because this should not be used for just a cough without any other diagnosis.    Kenrick Wheeler PA-C

## 2017-06-12 ENCOUNTER — OFFICE VISIT (OUTPATIENT)
Dept: FAMILY MEDICINE | Facility: OTHER | Age: 82
End: 2017-06-12
Payer: COMMERCIAL

## 2017-06-12 DIAGNOSIS — J41.0 SIMPLE CHRONIC BRONCHITIS (H): ICD-10-CM

## 2017-06-12 DIAGNOSIS — R05.9 COUGH: Primary | ICD-10-CM

## 2017-06-12 LAB
FEF 25/75: NORMAL
FEV-1: NORMAL
FEV1/FVC: NORMAL
FVC: NORMAL

## 2017-06-12 PROCEDURE — 99214 OFFICE O/P EST MOD 30 MIN: CPT | Performed by: FAMILY MEDICINE

## 2017-06-12 NOTE — PATIENT INSTRUCTIONS
Continue using flonase, we can add more allergy medication if your symptoms are not well controlled with the flonase alone.

## 2017-06-12 NOTE — MR AVS SNAPSHOT
"              After Visit Summary   6/12/2017    Jerry Rodriguez    MRN: [id number]           Patient Information     Date Of Birth          1935        Visit Information        Provider Department      6/12/2017 11:30 AM Roberta Beaver MD [facility]        Today's Diagnoses     Cough    -  1    Simple chronic bronchitis (H)          Care Instructions    Continue using flonase, we can add more allergy medication if your symptoms are not well controlled with the flonase alone.              Follow-ups after your visit        Your next 10 appointments already scheduled     Jun 28, 2017  1:00 PM CDT   Pulmonary Function with NL RESPIRATORY THERAPY   [facility] ([facility])    [address] [place] [address] [phone]           No Inhalers for 6 hours prior to test No Smoking 2 hours prior to test              Who to contact     If you have questions or need follow up information about today's clinic visit or your schedule please contact [facility] directly at [phone].  Normal or non-critical lab and imaging results will be communicated to you by Stionhart, letter or phone within 4 business days after the clinic has received the results. If you do not hear from us within 7 days, please contact the clinic through DeRevt or phone. If you have a critical or abnormal lab result, we will notify you by phone as soon as possible.  Submit refill requests through Drybar or call your pharmacy and they will forward the refill request to us. Please allow 3 business days for your refill to be completed.          Additional Information About Your Visit        Stionhart Information     Drybar lets you send messages to your doctor, view your test results, renew your prescriptions, schedule appointments and more. To sign up, go to [email or web address] . Click on \"Log in\" on the left side of the screen, which will take you to the " "Welcome page. Then click on \"Sign up Now\" on the right side of the page.     You will be asked to enter the access code listed below, as well as some personal information. Please follow the directions to create your username and password.     Your access code is: [email or web address]  Expires: 2017  7:25 AM     Your access code will  in 90 days. If you need help or a new code, please call your [place] clinic or [phone].        Care EveryWhere ID     This is your Care EveryWhere ID. This could be used by other organizations to access your [place] medical records  [id number]        Your Vitals Were     Pulse Temperature Respirations Pulse Oximetry BMI (Body Mass Index)       75 97.7  F (36.5  C) (Oral) 20 97% 28.82 kg/m2        Blood Pressure from Last 3 Encounters:   17 120/74   17 118/76   17 135/80    Weight from Last 3 Encounters:   17 198 lb (89.8 kg)   17 199 lb (90.3 kg)   17 198 lb 3 oz (89.9 kg)              We Performed the Following     Spirometry, Breathing Capacity: Normal Order, Clinic Performed          Today's Medication Changes          These changes are accurate as of: 17 11:59 PM.  If you have any questions, ask your nurse or doctor.               Start taking these medicines.        Dose/Directions    beclomethasone 40 MCG/ACT Inhaler   Commonly known as:  QVAR   Used for:  Simple chronic bronchitis (H)        Dose:  2 puff   Inhale 2 puffs into the lungs 2 times daily   Quantity:  1 Inhaler   Refills:  1            Where to get your medicines      These medications were sent to [facility] [address] [place] [id number] [address] [address]     Phone:  [phone]     beclomethasone 40 MCG/ACT Inhaler                Primary Care Provider Office Phone # Fax #    Roberta Beaver [phone]       [facility]  [address] [facility] [address]        Equal Access to Services     " [facility]: Hadii aad ku kojo Sopatrickali, waaxda luqadaha, qaybta kaalmada adekeesha, kenyatta jenniferin hayaajuan bauergilson kiser giuliano . So [facility] [phone].    ATENCIÓN: Si habla oren, tiene a billingsley disposición servicios gratuitos de asistencia lingüística. Llame al [phone].    We comply with applicable federal civil rights laws and [place] laws. We do not discriminate on the basis of race, color, national origin, age, disability sex, sexual orientation or gender identity.            Thank you!     Thank you for choosing [facility]  for your care. Our goal is always to provide you with excellent care. Hearing back from our patients is one way we can continue to improve our services. Please take a few minutes to complete the written survey that you may receive in the mail after your visit with us. Thank you!             Your Updated Medication List - Protect others around you: Learn how to safely use, store and throw away your medicines at www.disposemymeds.org.          This list is accurate as of: 6/12/17 11:59 PM.  Always use your most recent med list.                   Brand Name Dispense Instructions for use Diagnosis    ADVIL PO      Take 400 mg by mouth        albuterol 108 (90 BASE) MCG/ACT Inhaler    VENTOLIN HFA    18 g    INHALE TWO PUFFS BY MOUTH EVERY SIX HOURS AS NEEDED FOR SHORTNESS OF BREATH OR WHEEZING. DUE FOR FOLLOW UP    Cough       amLODIPine 5 MG tablet    NORVASC    90 tablet    Take 1 tablet (5 mg) by mouth daily    Essential hypertension       aspirin 81 MG tablet      ONE DAILY        beclomethasone 40 MCG/ACT Inhaler    QVAR    1 Inhaler    Inhale 2 puffs into the lungs 2 times daily    Simple chronic bronchitis (H)       busPIRone 5 MG tablet    BUSPAR    180 tablet    Take 1 tablet (5 mg) by mouth 2 times daily    Anxiety state       cetirizine 10 MG tablet    zyrTEC    30 tablet    Take 1 tablet (10 mg) by mouth every evening    Seasonal allergic rhinitis,  unspecified allergic rhinitis trigger       meclizine 25 MG tablet    ANTIVERT    30 tablet    Take 1 tablet (25 mg) by mouth 3 times daily as needed for dizziness    Vertigo       Multi-vitamin Tabs tablet      Take 1 tablet by mouth daily        omeprazole 20 MG CR capsule    priLOSEC    90 capsule    Take 1 capsule (20 mg) by mouth daily    Gastroesophageal reflux disease without esophagitis       timolol 0.5 % ophthalmic solution    TIMOPTIC     Apply 1 drop to eye 2 times daily        timolol hemihydrate 0.25 % Soln ophthalmic solution    BETIMOL     Reported on 3/29/2017        triamcinolone 0.1 % cream    KENALOG    80 g    Apply thin layer to affected areas two times a day as needed for itch. Maximum 2 weeks use    Dry skin

## 2017-06-13 VITALS
TEMPERATURE: 97.7 F | HEART RATE: 75 BPM | WEIGHT: 198 LBS | OXYGEN SATURATION: 97 % | RESPIRATION RATE: 20 BRPM | DIASTOLIC BLOOD PRESSURE: 74 MMHG | BODY MASS INDEX: 28.82 KG/M2 | SYSTOLIC BLOOD PRESSURE: 120 MMHG

## 2017-06-13 ASSESSMENT — PAIN SCALES - GENERAL: PAINLEVEL: NO PAIN (0)

## 2017-06-13 NOTE — NURSING NOTE
"Chief Complaint   Patient presents with     Discuss Inhaler     Panel Management     MyChart, Fall Risk, honoring choices, lipid, BMP       Initial /74  Pulse 75  Temp 97.7  F (36.5  C) (Oral)  Resp 20  Wt 198 lb (89.8 kg)  SpO2 97%  BMI 28.82 kg/m2 Estimated body mass index is 28.82 kg/(m^2) as calculated from the following:    Height as of 3/29/17: 5' 9.5\" (1.765 m).    Weight as of this encounter: 198 lb (89.8 kg).  Medication Reconciliation: complete  Miracle Faith CMA (AAMA)    "

## 2017-06-14 ENCOUNTER — TELEPHONE (OUTPATIENT)
Dept: FAMILY MEDICINE | Facility: OTHER | Age: 82
End: 2017-06-14

## 2017-06-14 NOTE — TELEPHONE ENCOUNTER
"Unsure if [place] was done manually and we are waiting for it to be scanned.  Chely Stokes, CMA    Per last OV: \"The patient reports that his cough, sinus, and sneezing symptoms were well controlled with Flonase and notes that they have worsened significantly after he ran out of his Flonase. I recommended that he continue using his nasal spray, and we can add an oral allergy medication if his symptoms are not well controlled with the Flonase alone.  - I was not able to review spirometry today due to computer issues. will change inhalers if the spirometry indicates.\"    "

## 2017-06-14 NOTE — TELEPHONE ENCOUNTER
Reason for Call:  Other results    Detailed comments: pt wife calling states Sd was going to call back with results from pts appt on 06/12/17. Pt states did a breathing test. Please advise and contact pt in regards.    Phone Number Patient can be reached at: Home number on file [phone] (home)    Best Time: ANY    Can we leave a detailed message on this number? YES    Call taken on 6/14/2017 at 12:44 PM by Lucía Jaimes

## 2017-06-14 NOTE — TELEPHONE ENCOUNTER
Stephen was done manually and could not print due to lack of ink.  Please clarify with Bonnie Joseph where we are with getting the ink.  I still have not seen the spirometry.  Roberta Beaver MD

## 2017-06-15 NOTE — TELEPHONE ENCOUNTER
Attempted to contact patient - no answer, no voicemail.     Ink should be here today. Will await until this gets here and will give to AE when printed. (If it is still available. Patient might have to come back to repeat spiry but will know more once we are able to print the report.)  Chely Stokes, CMA

## 2017-06-15 NOTE — TELEPHONE ENCOUNTER
Reason for Call:  Request for results:    Name of test or procedure: spirometry    Date of test of procedure: 06/12/17    Location of the test or procedure: [place]     OK to leave the result message on voice mail or with a family member? YES    Phone number Patient can be reached at:  Home number on file [phone] (home)    Additional comments: N/A     Call taken on 6/15/2017 at 9:21 AM by Shruti Ashton

## 2017-06-16 NOTE — TELEPHONE ENCOUNTER
Spirometry printed and placed in AE bin for review. AE out today, will need to wait until Monday. Patient informed we will call him once AE reviews.  Chely Stokes, CMA

## 2017-06-19 NOTE — TELEPHONE ENCOUNTER
Spoke with patient gave message, half way through conversation passed to wife gave message, no further questions. Miracle Faith CMA ([facility])

## 2017-06-19 NOTE — TELEPHONE ENCOUNTER
Spirometry has mod COPD, which usually needs a controller inhaler to reduce cough.  Added Qvar to help.  Please use this daily to reduce cough, will likely not reduce for the next week or 2 as often there is an increase in cough as the lungs open before it reduces.  Spirometry in box for scanning.  Roberta Beaver MD

## 2017-06-21 ENCOUNTER — TELEPHONE (OUTPATIENT)
Dept: FAMILY MEDICINE | Facility: OTHER | Age: 82
End: 2017-06-21

## 2017-06-21 DIAGNOSIS — J41.0 SIMPLE CHRONIC BRONCHITIS (H): Primary | ICD-10-CM

## 2017-06-21 RX ORDER — FLUTICASONE PROPIONATE 220 UG/1
2 AEROSOL, METERED RESPIRATORY (INHALATION) 2 TIMES DAILY
Qty: 1 INHALER | Refills: 1 | Status: SHIPPED | OUTPATIENT
Start: 2017-06-21 | End: 2017-07-27

## 2017-06-21 NOTE — TELEPHONE ENCOUNTER
Qvar is a non formulary drug. Would need to change ot have is try for a PA? What would you like us to do?            [phone] [id number] [phone]

## 2017-06-22 ENCOUNTER — TELEPHONE (OUTPATIENT)
Dept: FAMILY MEDICINE | Facility: OTHER | Age: 82
End: 2017-06-22

## 2017-06-22 NOTE — TELEPHONE ENCOUNTER
Qvar denied- would need to try and fail one of these.flovent diskus, flovent HA , Pulmicort flexhaler,  armuity ellipta  flovent was sent to pharmacy

## 2017-06-22 NOTE — TELEPHONE ENCOUNTER
Called pt, he said someone called this morning and told him not to have caffeine.  I called respiratory therapy to confirm, and they said they prefer they don't have caffeine as it is a bronchodilator.  He will also hold his inhaler 6 hours prior.

## 2017-06-22 NOTE — TELEPHONE ENCOUNTER
Reason for Call:  Other     Detailed comments: pt states wondering if he can take his medications before coming to appt on Wednesday 06/28/17 for pulmonary function test. Please advise and contact pt in regards.    Phone Number Patient can be reached at: Home number on file [phone] (home)    Best Time: ANY    Can we leave a detailed message on this number? YES    Call taken on 6/22/2017 at 2:36 PM by Lucía Jaimes

## 2017-06-27 ENCOUNTER — TELEPHONE (OUTPATIENT)
Dept: FAMILY MEDICINE | Facility: OTHER | Age: 82
End: 2017-06-27

## 2017-06-27 NOTE — TELEPHONE ENCOUNTER
Ii have not given him any restrictions, though his wife is very insistent that he has much to change in his diet and I would support him trying his best to limit caffeine to control his BP.  Roberta Beaver MD

## 2017-06-28 ENCOUNTER — TELEPHONE (OUTPATIENT)
Dept: FAMILY MEDICINE | Facility: OTHER | Age: 82
End: 2017-06-28

## 2017-06-28 ENCOUNTER — HOSPITAL ENCOUNTER (OUTPATIENT)
Dept: RESPIRATORY THERAPY | Facility: CLINIC | Age: 82
Discharge: HOME | End: 2017-06-28
Attending: FAMILY MEDICINE | Admitting: FAMILY MEDICINE
Payer: MEDICARE

## 2017-06-28 PROCEDURE — 94060 EVALUATION OF WHEEZING: CPT

## 2017-06-28 PROCEDURE — 94726 PLETHYSMOGRAPHY LUNG VOLUMES: CPT

## 2017-06-28 PROCEDURE — 94729 DIFFUSING CAPACITY: CPT

## 2017-06-28 PROCEDURE — 25000125 ZZHC RX 250: Performed by: FAMILY MEDICINE

## 2017-06-28 RX ORDER — ALBUTEROL SULFATE 0.83 MG/ML
2.5 SOLUTION RESPIRATORY (INHALATION)
Status: COMPLETED | OUTPATIENT
Start: 2017-06-28 | End: 2017-06-28

## 2017-06-28 RX ADMIN — ALBUTEROL SULFATE 2.5 MG: 2.5 SOLUTION RESPIRATORY (INHALATION) at 13:03

## 2017-06-28 NOTE — PROGRESS NOTES
Pre-Bronch    Post-Bronch         Actual Pred %Pred  Actual %Pred %Chng       ---- SPIROMETRY ----                          FVC (L)  2.72 3.68 73   2.58 70 -4            FEV1 (L)  1.79 2.72 65   1.63 60 -8            FEV1/FVC (%) 66 75     63   -4            FEV1/SVC (%) 81 63                      FEV1/FEV6 (%) 72 76     65   -8            FEF Max (L/sec) 3.49 6.70 52   3.21 47 -8            FEF 25-75% (L/sec) 1.30 1.90 68   0.76 40 -41            FIVC (L) 2.47       2.21   -10            FIF Max (L/sec) 2.61 6.27 41   2.39 38 -8            Expiratory Time (sec) 7.50       6.73   -10            ----LUNG MECHANICS                           MVV (L/min)   109                      MEP (cmH2O)                          MIP (cmH2O)                          ---- LUNG VOLUMES ----                  SVC (L) 2.22 4.30 51            IC (L) 2.03 3.59 56            ERV (L) 0.19 0.71 27            FRC(Pleth) (L) 7.29 3.75 194            RV (Pleth) (L) 7.09 2.88 246            TLC (Pleth) (L) 9.32 6.92 134            RV/TLC (Pleth) (%) 76 46                                    ---- DIFFUSION ----                          DLCOunc (ml/min/mmHg) 17.13 22.89 74                    DLCOcor (ml/min/mmHg) 16.77 22.89 73                    DL/VA (ml/min/mmHg/L) 3.88 3.45                      VA (L) 4.32 6.63 65                    IVC (L) 2.19                        Hgb (gm/dL) 15.4 12-18

## 2017-06-28 NOTE — TELEPHONE ENCOUNTER
Looks like PFTs completed today and are similar to the results from spirometry.  Still recommending daily use of your controller we sent over (flovent)  Roberta Beaver MD

## 2017-06-28 NOTE — DISCHARGE INSTRUCTIONS
Thank you for completing pulmonary function testing today.  All results will be scanned into your epic results for your doctor to review.  Please resume taking all your current prescribed medications and diet as directed by your provider.   If you have not heard from your provider about your testing within two weeks and do not have a follow-up appointment scheduled with them please contact your provider about any questions you have concerning your testing.   Thank you  [facility]

## 2017-06-28 NOTE — PROGRESS NOTES
The FVC, FEV1, FEV1/FVC ratio and QYA11-05% are reduced indicating airway obstruction.  The inspiratory flow rates are reduced.  The airway resistance is normal.  The TLC, RV, FRC and RV/TLC ratio are all increased indicating overinflation and air trapping.  Following administration of bronchodilators, there is no significant response.  The reduced diffusing capacity indicates a minimal loss of functional alveolar capillary surface.  Maldistribution of ventilation is indicated by the difference between the alveolar volume and the total lung capacity.    A reduced diffusing capacity, minimal airway obstruction and overinflation are characteristic of emphysema.    IMPRESSION:  Minimal Airflow Obstruction  -Emphysematous Type

## 2017-06-29 NOTE — TELEPHONE ENCOUNTER
Flovent is used every day scheduled.  Ventolin is used for as needed extra.  Please use these regularly and we can review in 1-2 months how you are doing and if anything more needs to change.  Roberta Beaver MD

## 2017-07-10 LAB
DLCOCOR-%PRED-PRE: 73 %
DLCOCOR-PRE: 16.77 ML/MIN/MMHG
DLCOUNC-%PRED-PRE: 74 %
DLCOUNC-PRE: 17.13 ML/MIN/MMHG
DLCOUNC-PRED: 22.89 ML/MIN/MMHG
ERV-%PRED-PRE: 27 %
ERV-PRE: 0.19 L
ERV-PRED: 0.71 L
EXPTIME-PRE: 7.5 SEC
FEF2575-%PRED-POST: 40 %
FEF2575-%PRED-PRE: 68 %
FEF2575-POST: 0.76 L/SEC
FEF2575-PRE: 1.3 L/SEC
FEF2575-PRED: 1.9 L/SEC
FEFMAX-%PRED-PRE: 52 %
FEFMAX-PRE: 3.49 L/SEC
FEFMAX-PRED: 6.7 L/SEC
FEV1-%PRED-PRE: 65 %
FEV1-PRE: 1.79 L
FEV1FEV6-PRE: 72 %
FEV1FEV6-PRED: 76 %
FEV1FVC-PRE: 66 %
FEV1FVC-PRED: 75 %
FEV1SVC-PRE: 81 %
FEV1SVC-PRED: 63 %
FIFMAX-PRE: 2.61 L/SEC
FRCPLETH-%PRED-PRE: 194 %
FRCPLETH-PRE: 7.29 L
FRCPLETH-PRED: 3.75 L
FVC-%PRED-PRE: 73 %
FVC-PRE: 2.72 L
FVC-PRED: 3.68 L
GAW-%PRED-PRE: 95 %
GAW-PRE: 0.98 L/S/CMH2O
GAW-PRED: 1.03 L/S/CMH2O
IC-%PRED-PRE: 56 %
IC-PRE: 2.03 L
IC-PRED: 3.59 L
RVPLETH-%PRED-PRE: 246 %
RVPLETH-PRE: 7.09 L
RVPLETH-PRED: 2.88 L
SGAW-%PRED-PRE: 157 %
SGAW-PRE: 0.13 1/CMH2O*S
SGAW-PRED: 0.08 1/CMH2O*S
SRAW-%PRED-PRE: 180 %
SRAW-PRE: 8.59 CMH2O*S
SRAW-PRED: 4.76 CMH2O*S
TLCPLETH-%PRED-PRE: 134 %
TLCPLETH-PRE: 9.32 L
TLCPLETH-PRED: 6.92 L
VA-%PRED-PRE: 65 %
VA-PRE: 4.32 L
VC-%PRED-PRE: 51 %
VC-PRE: 2.22 L
VC-PRED: 4.3 L

## 2017-07-18 ENCOUNTER — OFFICE VISIT (OUTPATIENT)
Dept: FAMILY MEDICINE | Facility: OTHER | Age: 82
End: 2017-07-18
Payer: COMMERCIAL

## 2017-07-18 VITALS
RESPIRATION RATE: 16 BRPM | OXYGEN SATURATION: 98 % | HEIGHT: 70 IN | HEART RATE: 83 BPM | WEIGHT: 189 LBS | SYSTOLIC BLOOD PRESSURE: 122 MMHG | DIASTOLIC BLOOD PRESSURE: 60 MMHG | BODY MASS INDEX: 27.06 KG/M2 | TEMPERATURE: 98.4 F

## 2017-07-18 DIAGNOSIS — B37.0 THRUSH: Primary | ICD-10-CM

## 2017-07-18 DIAGNOSIS — M17.0 PRIMARY OSTEOARTHRITIS OF BOTH KNEES: ICD-10-CM

## 2017-07-18 DIAGNOSIS — I10 BENIGN ESSENTIAL HYPERTENSION: ICD-10-CM

## 2017-07-18 PROCEDURE — 99214 OFFICE O/P EST MOD 30 MIN: CPT | Performed by: FAMILY MEDICINE

## 2017-07-18 RX ORDER — NYSTATIN 100000/ML
500000 SUSPENSION, ORAL (FINAL DOSE FORM) ORAL 4 TIMES DAILY
Qty: 60 ML | Refills: 0 | Status: SHIPPED | OUTPATIENT
Start: 2017-07-18 | End: 2017-07-27

## 2017-07-18 RX ORDER — PREDNISONE 20 MG/1
20 TABLET ORAL DAILY
Qty: 5 TABLET | Refills: 0 | Status: SHIPPED | OUTPATIENT
Start: 2017-07-18 | End: 2017-07-27

## 2017-07-18 ASSESSMENT — PAIN SCALES - GENERAL: PAINLEVEL: EXTREME PAIN (8)

## 2017-07-18 NOTE — PROGRESS NOTES
SUBJECTIVE:                                                    Jerry Rodriguez is a 82 year old male who presents to clinic today for the following health issues:      HPI    Acute Illness   Acute illness concerns: Cough/Congestion  Onset: x 1 month    Fever: no    Chills/Sweats: YES- Sweats    Headache (location?): YES    Sinus Pressure:no    Conjunctivitis:  no    Ear Pain: no    Rhinorrhea: YES    Congestion: YES    Sore Throat: YES     Cough: YES-productive of clear sputum, productive of yellow sputum    Wheeze: no    Decreased Appetite: YES    Nausea: no    Vomiting: no    Diarrhea:  no    Dysuria/Freq.: no    Fatigue/Achiness: no    Sick/Strep Exposure: no      Therapies Tried and outcome: Inhalers    Joint Pain    Onset: x 6 months    Description:   Location: Both Knees/ Calves  Character: Sharp and Dull ache    Intensity: moderate, 8/10    Progression of Symptoms: worse    Accompanying Signs & Symptoms:  Other symptoms: radiation of pain to Calves to Feet    History:   Previous similar pain: Yes      Precipitating factors:   Trauma or overuse: no     Alleviating factors:  Improved by: nothing    Therapies Tried and outcome: Asper Cream/Advil        Problem list and histories reviewed & adjusted, as indicated.  Additional history: as documented        Patient Active Problem List   Diagnosis     Other atopic dermatitis and related conditions     Chronic rhinitis     Generalized osteoarthrosis, unspecified site     Primary localized osteoarthrosis of shoulder region     Stricture and stenosis of esophagus     Anxiety state     HTN (hypertension)     Tobacco use disorder     CARDIOVASCULAR SCREENING; LDL GOAL LESS THAN 130     Hyponatremia     Degenerative arthritis of finger     Advanced directives, counseling/discussion     Hammer toe     Corn     NOEMI positive     Bullous dermatitis     HTN, goal below 140/90     Vertigo     Adenomatous polyp of colon     Lumbosacral neuritis - moderate at L4/5      Degeneration of lumbar intervertebral disc     Past Surgical History:   Procedure Laterality Date     C OSTEOTOMY HIP/FEMUR  1980    traumatic fracture right hip     COLONOSCOPY  10/17/2007    Repeat Colonoscopy in 5 years for surveillance.     ESOPHAGOSCOPY, GASTROSCOPY, DUODENOSCOPY (EGD), COMBINED  1/4/2011    COMBINED ESOPHAGOSCOPY, GASTROSCOPY, DUODENOSCOPY (EGD), ESOPHAGEAL DILATION BALLOON LESS THAN 30MM performed by ELEN BUSBY at  GI     ESOPHAGOSCOPY, GASTROSCOPY, DUODENOSCOPY (EGD), COMBINED N/A 9/25/2015    Procedure: COMBINED ESOPHAGOSCOPY, GASTROSCOPY, DUODENOSCOPY (EGD), BIOPSY SINGLE OR MULTIPLE;  Surgeon: Markell Lawson MD;  Location: [facility]      COLONOSCOPY THRU STOMA, DIAGNOSTIC  11/15/2000    Recurrent abdominal pain     HC COLONOSCOPY W/WO BRUSH/WASH  07/19/2004      REPAIR INCISIONAL HERNIA,REDUCIBLE  11/10/2000    Hernia Repair, Incisional, Unilateral, Left inguinal hernia and umbilical hernia     HC UGI ENDOSCOPY DIAG W OR W/O BRUSH/WASH  07/19/2004     HC UGI ENDOSCOPY DIAG W OR W/O BRUSH/WASH  05/24/2005    Peptic stricture of esophagus, dilated to 18 mm. Erosive reflux esophagitis. Hiatal hernia. Erosive duodenitis.      UGI ENDOSCOPY, SIMPLE EXAM  02/20/07     HC UGI ENDOSCOPY, SIMPLE EXAM  12/02/08    Schiatzky's ring, dialated, PPI indefinitely      UGI ENDOSCOPY, SIMPLE EXAM  12/15/2009    benign stricture with dialation     LAPAROSCOPIC APPENDECTOMY  09/15/2005       Social History   Substance Use Topics     Smoking status: Current Every Day Smoker     Packs/day: 1.00     Years: 52.00     Types: Cigarettes     Smokeless tobacco: Current User     Types: Chew      Comment: occasionally/ 1.5tin qwk     Alcohol use No      Comment: quit 7-1980     Family History   Problem Relation Age of Onset     CANCER Sister      brain tumor     DIABETES Sister      Alzheimer Disease Sister          Current Outpatient Prescriptions   Medication Sig Dispense Refill     nystatin  (MYCOSTATIN) 712923 UNIT/ML suspension Take 5 mLs (500,000 Units) by mouth 4 times daily 60 mL 0     predniSONE (DELTASONE) 20 MG tablet Take 1 tablet (20 mg) by mouth daily 5 tablet 0     fluticasone (FLOVENT HFA) 220 MCG/ACT Inhaler Inhale 2 puffs into the lungs 2 times daily 1 Inhaler 1     beclomethasone (QVAR) 40 MCG/ACT Inhaler Inhale 2 puffs into the lungs 2 times daily 1 Inhaler 1     albuterol (VENTOLIN HFA) 108 (90 BASE) MCG/ACT Inhaler INHALE TWO PUFFS BY MOUTH EVERY SIX HOURS AS NEEDED FOR SHORTNESS OF BREATH OR WHEEZING. DUE FOR FOLLOW UP 18 g 0     cetirizine (ZYRTEC) 10 MG tablet Take 1 tablet (10 mg) by mouth every evening 30 tablet 9     amLODIPine (NORVASC) 5 MG tablet Take 1 tablet (5 mg) by mouth daily 90 tablet 3     busPIRone (BUSPAR) 5 MG tablet Take 1 tablet (5 mg) by mouth 2 times daily 180 tablet 1     timolol (TIMOPTIC) 0.5 % ophthalmic solution Apply 1 drop to eye 2 times daily       Ibuprofen (ADVIL PO) Take 400 mg by mouth       triamcinolone (KENALOG) 0.1 % cream Apply thin layer to affected areas two times a day as needed for itch. Maximum 2 weeks use 80 g 0     omeprazole (PRILOSEC) 20 MG capsule Take 1 capsule (20 mg) by mouth daily 90 capsule 3     multivitamin, therapeutic with minerals (MULTI-VITAMIN) TABS Take 1 tablet by mouth daily       meclizine (ANTIVERT) 25 MG tablet Take 1 tablet (25 mg) by mouth 3 times daily as needed for dizziness 30 tablet 12     TIMOLOL 0.25 % OP SOLN Reported on 3/29/2017  0     ASPIRIN 81 MG OR TABS ONE DAILY       Allergies   Allergen Reactions     No Known Drug Allergies      BP Readings from Last 3 Encounters:   07/18/17 122/60   06/13/17 120/74   03/29/17 118/76    Wt Readings from Last 3 Encounters:   07/18/17 189 lb (85.7 kg)   06/13/17 198 lb (89.8 kg)   03/29/17 199 lb (90.3 kg)                  Labs reviewed in EPIC        ROS:  Constitutional, HEENT, cardiovascular, pulmonary, GI, , musculoskeletal, neuro, skin, endocrine and psych  "systems are negative, except as otherwise noted.      OBJECTIVE:   /60 (BP Location: Right arm, Patient Position: Chair, Cuff Size: Adult Regular)  Pulse 83  Temp 98.4  F (36.9  C) (Oral)  Resp 16  Ht 5' 9.5\" (1.765 m)  Wt 189 lb (85.7 kg)  SpO2 98%  BMI 27.51 kg/m2  Body mass index is 27.51 kg/(m^2).   Physical Exam   Constitutional: He is oriented to person, place, and time. He appears well-developed and well-nourished.   HENT:   Head: Normocephalic and atraumatic.   Right Ear: External ear normal.   Left Ear: External ear normal.   Mouth/Throat: Oropharynx is clear and moist.   thrush   Eyes: EOM are normal.   Neck: Neck supple.   Cardiovascular: Normal rate and regular rhythm.    Pulmonary/Chest: Effort normal and breath sounds normal.   Abdominal: Soft. Bowel sounds are normal.   Musculoskeletal: Normal range of motion. He exhibits edema and tenderness.   Neurological: He is alert and oriented to person, place, and time.   Psychiatric: He has a normal mood and affect.         Diagnostic Test Results:  none     ASSESSMENT/PLAN:   1. Thrush  Antifungal as prescribed  - nystatin (MYCOSTATIN) [id number] UNIT/ML suspension; Take 5 mLs (500,000 Units) by mouth 4 times daily  Dispense: 60 mL; Refill: 0    2. Benign essential hypertension  Well controlled  Continue current meds  - **Lipid panel reflex to direct LDL FUTURE 1yr; Future  - **Basic metabolic panel FUTURE 1yr; Future    3. Primary osteoarthritis of both knees  Attempted doing knee joint injection but was not successful  Refer to ortho  Trial oral prednisone in the interim  - predniSONE (DELTASONE) 20 MG tablet; Take 1 tablet (20 mg) by mouth daily  Dispense: 5 tablet; Refill: 0  - ORTHO  REFERRAL         Mally Farnsworth MD  [facility]"

## 2017-07-18 NOTE — NURSING NOTE
"Chief Complaint   Patient presents with     Cough     Health Maintenance       Initial /60 (BP Location: Right arm, Patient Position: Chair, Cuff Size: Adult Regular)  Pulse 83  Temp 98.4  F (36.9  C) (Oral)  Resp 16  Ht 5' 9.5\" (1.765 m)  Wt 189 lb (85.7 kg)  SpO2 98%  BMI 27.51 kg/m2 Estimated body mass index is 27.51 kg/(m^2) as calculated from the following:    Height as of this encounter: 5' 9.5\" (1.765 m).    Weight as of this encounter: 189 lb (85.7 kg).  Medication Reconciliation: complete   Brisa Steel CMA (AAMA)      "

## 2017-07-18 NOTE — MR AVS SNAPSHOT
After Visit Summary   7/18/2017    Jerry Rodriguez    MRN: [id number]           Patient Information     Date Of Birth          1935        Visit Information        Provider Department      7/18/2017 9:20 AM Mally Farnsworth MD [facility]        Today's Diagnoses     Thrush    -  1    Benign essential hypertension        Primary osteoarthritis of both knees           Follow-ups after your visit        Additional Services     ORTHO  REFERRAL       [facility] is referring you to the Orthopedic  Services at [facility].       The  Representative will assist you in the coordination of your Orthopedic and Musculoskeletal Care as prescribed by your physician.    The  Representative will call you within 1 business day to help schedule your appointment, or you may contact the  Representative at:    All areas ~ [phone]     Type of Referral : Surgical / Specialist       Timeframe requested: 3 - 5 days    Coverage of these services is subject to the terms and limitations of your health insurance plan.  Please call member services at your health plan with any benefit or coverage questions.      If X-rays, CT or MRI's have been performed, please contact the facility where they were done to arrange for , prior to your scheduled appointment.  Please bring this referral request to your appointment and present it to your specialist.                  Who to contact     If you have questions or need follow up information about today's clinic visit or your schedule please contact [facility] directly at [phone].  Normal or non-critical lab and imaging results will be communicated to you by MyChart, letter or phone within 4 business days after the clinic has received the results. If you do not hear from us within 7 days, please contact the clinic through MyChart or phone. If you have a  "critical or abnormal lab result, we will notify you by phone as soon as possible.  Submit refill requests through Correlec or call your pharmacy and they will forward the refill request to us. Please allow 3 business days for your refill to be completed.          Additional Information About Your Visit        Mirametrixhart Information     Correlec lets you send messages to your doctor, view your test results, renew your prescriptions, schedule appointments and more. To sign up, go to [email or web address] . Click on \"Log in\" on the left side of the screen, which will take you to the Welcome page. Then click on \"Sign up Now\" on the right side of the page.     You will be asked to enter the access code listed below, as well as some personal information. Please follow the directions to create your username and password.     Your access code is: [email or web address]  Expires: 2017  7:25 AM     Your access code will  in 90 days. If you need help or a new code, please call your [facility] clinic or [phone].        Care EveryWhere ID     This is your Care EveryWhere ID. This could be used by other organizations to access your [place] medical records  [id number]        Your Vitals Were     Pulse Temperature Respirations Height Pulse Oximetry BMI (Body Mass Index)    83 98.4  F (36.9  C) (Oral) 16 5' 9.5\" (1.765 m) 98% 27.51 kg/m2       Blood Pressure from Last 3 Encounters:   17 122/60   17 120/74   17 118/76    Weight from Last 3 Encounters:   17 189 lb (85.7 kg)   17 198 lb (89.8 kg)   17 199 lb (90.3 kg)              We Performed the Following     BASIC METABOLIC PANEL     Lipid panel reflex to direct LDL     ORTHO  REFERRAL          Today's Medication Changes          These changes are accurate as of: 17 11:00 AM.  If you have any questions, ask your nurse or doctor.               Start taking these medicines.        Dose/Directions    nystatin 771372 UNIT/ML " suspension   Commonly known as:  MYCOSTATIN   Used for:  Thrush   Started by:  Mally Farnsworth MD        Dose:  185479 Units   Take 5 mLs (500,000 Units) by mouth 4 times daily   Quantity:  60 mL   Refills:  0       predniSONE 20 MG tablet   Commonly known as:  DELTASONE   Used for:  Primary osteoarthritis of both knees   Started by:  Mally Farnsworth MD        Dose:  20 mg   Take 1 tablet (20 mg) by mouth daily   Quantity:  5 tablet   Refills:  0            Where to get your medicines      These medications were sent to [facility] [address] [facility] [address] [facility]  [address] [place] [address]     Phone:  [phone]     nystatin 299135 UNIT/ML suspension    predniSONE 20 MG tablet                Primary Care Provider Office Phone # Fax #    Roberta Bel Beaver [phone]       [facility]  [address] [facility] [address]        Equal Access to Services     TAYLER LOPEZ : Hadii tashia medinao Sojacinto, waaxda luqadaha, qaybta kaalmada adeegyada, kenyatta vallecillo. So [facility] [phone].    ATENCIÓN: Si habla español, tiene a billingsley disposición servicios gratuitos de asistencia lingüística. Dayanara al [phone].    We comply with applicable federal civil rights laws and [place] laws. We do not discriminate on the basis of race, color, national origin, age, disability sex, sexual orientation or gender identity.            Thank you!     Thank you for choosing [facility]  for your care. Our goal is always to provide you with excellent care. Hearing back from our patients is one way we can continue to improve our services. Please take a few minutes to complete the written survey that you may receive in the mail after your visit with us. Thank you!             Your Updated Medication List - Protect others around you: Learn how to safely use, store and throw away your medicines at www.disposemymeds.org.          This list  is accurate as of: 7/18/17 11:00 AM.  Always use your most recent med list.                   Brand Name Dispense Instructions for use Diagnosis    ADVIL PO      Take 400 mg by mouth        albuterol 108 (90 BASE) MCG/ACT Inhaler    VENTOLIN HFA    18 g    INHALE TWO PUFFS BY MOUTH EVERY SIX HOURS AS NEEDED FOR SHORTNESS OF BREATH OR WHEEZING. DUE FOR FOLLOW UP    Cough       amLODIPine 5 MG tablet    NORVASC    90 tablet    Take 1 tablet (5 mg) by mouth daily    Essential hypertension       aspirin 81 MG tablet      ONE DAILY        beclomethasone 40 MCG/ACT Inhaler    QVAR    1 Inhaler    Inhale 2 puffs into the lungs 2 times daily    Simple chronic bronchitis (H)       busPIRone 5 MG tablet    BUSPAR    180 tablet    Take 1 tablet (5 mg) by mouth 2 times daily    Anxiety state       cetirizine 10 MG tablet    zyrTEC    30 tablet    Take 1 tablet (10 mg) by mouth every evening    Seasonal allergic rhinitis, unspecified allergic rhinitis trigger       fluticasone 220 MCG/ACT Inhaler    FLOVENT HFA    1 Inhaler    Inhale 2 puffs into the lungs 2 times daily    Simple chronic bronchitis (H)       meclizine 25 MG tablet    ANTIVERT    30 tablet    Take 1 tablet (25 mg) by mouth 3 times daily as needed for dizziness    Vertigo       Multi-vitamin Tabs tablet      Take 1 tablet by mouth daily        nystatin [id number] UNIT/ML suspension    MYCOSTATIN    60 mL    Take 5 mLs (500,000 Units) by mouth 4 times daily    Thrush       omeprazole 20 MG CR capsule    priLOSEC    90 capsule    Take 1 capsule (20 mg) by mouth daily    Gastroesophageal reflux disease without esophagitis       predniSONE 20 MG tablet    DELTASONE    5 tablet    Take 1 tablet (20 mg) by mouth daily    Primary osteoarthritis of both knees       timolol 0.5 % ophthalmic solution    TIMOPTIC     Apply 1 drop to eye 2 times daily        timolol hemihydrate 0.25 % Soln ophthalmic solution    BETIMOL     Reported on 3/29/2017        triamcinolone 0.1 % cream     KENALOG    80 g    Apply thin layer to affected areas two times a day as needed for itch. Maximum 2 weeks use    Dry skin

## 2017-07-24 ENCOUNTER — TELEPHONE (OUTPATIENT)
Dept: FAMILY MEDICINE | Facility: OTHER | Age: 82
End: 2017-07-24

## 2017-07-24 DIAGNOSIS — J41.0 SIMPLE CHRONIC BRONCHITIS (H): ICD-10-CM

## 2017-07-24 NOTE — TELEPHONE ENCOUNTER
Pt wife returned call and will go to pharmacy to pick this up and will call back to schedule appt with RN

## 2017-07-24 NOTE — TELEPHONE ENCOUNTER
Message from Pharmacy regarding flovent HFA- Patient states this is not working well for him. Can we consider new rx or even adding a spacer?

## 2017-07-24 NOTE — TELEPHONE ENCOUNTER
Reason for call:  FYI patient was supposed to come see a nurse to teach him how to run a spacer.  Spouse states that cub has the RN there showed them how to do this.

## 2017-07-25 ENCOUNTER — RADIANT APPOINTMENT (OUTPATIENT)
Dept: GENERAL RADIOLOGY | Facility: CLINIC | Age: 82
End: 2017-07-25
Attending: ORTHOPAEDIC SURGERY
Payer: COMMERCIAL

## 2017-07-25 ENCOUNTER — OFFICE VISIT (OUTPATIENT)
Dept: ORTHOPEDICS | Facility: CLINIC | Age: 82
End: 2017-07-25
Attending: FAMILY MEDICINE
Payer: COMMERCIAL

## 2017-07-25 VITALS — WEIGHT: 189 LBS | HEIGHT: 70 IN | BODY MASS INDEX: 27.06 KG/M2 | TEMPERATURE: 97.8 F

## 2017-07-25 DIAGNOSIS — M25.561 PAIN IN BOTH KNEES, UNSPECIFIED CHRONICITY: ICD-10-CM

## 2017-07-25 DIAGNOSIS — M25.562 PAIN IN BOTH KNEES, UNSPECIFIED CHRONICITY: ICD-10-CM

## 2017-07-25 DIAGNOSIS — M17.0 PRIMARY OSTEOARTHRITIS OF BOTH KNEES: Primary | ICD-10-CM

## 2017-07-25 PROCEDURE — 73564 X-RAY EXAM KNEE 4 OR MORE: CPT | Mod: TC

## 2017-07-25 PROCEDURE — 20610 DRAIN/INJ JOINT/BURSA W/O US: CPT | Mod: LT | Performed by: ORTHOPAEDIC SURGERY

## 2017-07-25 PROCEDURE — 99203 OFFICE O/P NEW LOW 30 MIN: CPT | Mod: 25 | Performed by: ORTHOPAEDIC SURGERY

## 2017-07-25 RX ORDER — BETAMETHASONE SODIUM PHOSPHATE AND BETAMETHASONE ACETATE 3; 3 MG/ML; MG/ML
12 INJECTION, SUSPENSION INTRA-ARTICULAR; INTRALESIONAL; INTRAMUSCULAR; SOFT TISSUE ONCE
Qty: 2 ML | Refills: 0
Start: 2017-07-25 | End: 2018-01-17

## 2017-07-25 ASSESSMENT — PAIN SCALES - GENERAL: PAINLEVEL: SEVERE PAIN (7)

## 2017-07-25 NOTE — PROGRESS NOTES
ORTHOPEDIC CLINIC CONSULT      Jerry Rodriguez is a 82 year old male who is seen in consultation at the request of Self.    History of Present illness:  Jerry presents for evaluation of:   1.) Bilateral knee pain    Onset:  Many years    Symptoms brought on by unknown.   Location:  Bilateral knees.    Character:  dull ache.    Progression of symptoms:  worse.    Previous similar pain: no .   Pain Level:  7/10.   Previous treatments: Patient has tried analgesic balm's and Advil.  Patient had a primary care appointment last week in which injection of cortisone was attempted but was not able to obtain the joint space. Patient states his knee was in an extended position and 3 attempts were made by primary care provider  Currently on Blood thinners? Yes, ASA 81 mg  Diagnosis of Diabetes? No      Past Medical History:   Diagnosis Date     Anxiety state, unspecified      Hypertension      Hyponatremia      Inguinal hernia with obstruction, without mention of gangrene, unilateral or unspecified, (not specified as recurrent)     Strangulated right inguinal hernia years ago     Inguinal hernia without mention of obstruction or gangrene, unilateral or unspecified, (not specified as recurrent) 11/07/2000    Left inguinal hernia, sliding and not incarcerated     Other joint derangement, not elsewhere classified, forearm 1980    traumatic fracture     Pathologic fracture of neck of femur (H)     Surgery for hip fracture     Stricture and stenosis of esophagus 2004       Past Surgical History:   Procedure Laterality Date     C OSTEOTOMY HIP/FEMUR  1980    traumatic fracture right hip     COLONOSCOPY  10/17/2007    Repeat Colonoscopy in 5 years for surveillance.     ESOPHAGOSCOPY, GASTROSCOPY, DUODENOSCOPY (EGD), COMBINED  1/4/2011    COMBINED ESOPHAGOSCOPY, GASTROSCOPY, DUODENOSCOPY (EGD), ESOPHAGEAL DILATION BALLOON LESS THAN 30MM performed by ELEN BUSBY at [facility]     ESOPHAGOSCOPY, GASTROSCOPY, DUODENOSCOPY (EGD), COMBINED  N/A 9/25/2015    Procedure: COMBINED ESOPHAGOSCOPY, GASTROSCOPY, DUODENOSCOPY (EGD), BIOPSY SINGLE OR MULTIPLE;  Surgeon: Markell Lawson MD;  Location: [facility]     HC COLONOSCOPY THRU STOMA, DIAGNOSTIC  11/15/2000    Recurrent abdominal pain     HC COLONOSCOPY W/WO BRUSH/WASH  07/19/2004     HC REPAIR INCISIONAL HERNIA,REDUCIBLE  11/10/2000    Hernia Repair, Incisional, Unilateral, Left inguinal hernia and umbilical hernia     HC UGI ENDOSCOPY DIAG W OR W/O BRUSH/WASH  07/19/2004     HC UGI ENDOSCOPY DIAG W OR W/O BRUSH/WASH  05/24/2005    Peptic stricture of esophagus, dilated to 18 mm. Erosive reflux esophagitis. Hiatal hernia. Erosive duodenitis.      UGI ENDOSCOPY, SIMPLE EXAM  02/20/07      UGI ENDOSCOPY, SIMPLE EXAM  12/02/08    Schiatzky's ring, dialated, PPI indefinitely      UGI ENDOSCOPY, SIMPLE EXAM  12/15/2009    benign stricture with dialation     LAPAROSCOPIC APPENDECTOMY  09/15/2005       Home Medications:  Prior to Admission medications    Medication Sig Start Date End Date Taking? Authorizing Provider   Spacer/Aero-Holding Chambers (VALVED HOLDING CHAMBER) JULISSA 1 Device 2 times daily To be used with flovent 7/24/17  Yes Roberta Beaver MD   nystatin (MYCOSTATIN) [id number] UNIT/ML suspension Take 5 mLs (500,000 Units) by mouth 4 times daily 7/18/17  Yes Mally Farnsworth MD   predniSONE (DELTASONE) 20 MG tablet Take 1 tablet (20 mg) by mouth daily 7/18/17  Yes Mally Farnsworth MD   fluticasone (FLOVENT HFA) 220 MCG/ACT Inhaler Inhale 2 puffs into the lungs 2 times daily 6/21/17  Yes Roberta Beaver MD   albuterol (VENTOLIN HFA) 108 (90 BASE) MCG/ACT Inhaler INHALE TWO PUFFS BY MOUTH EVERY SIX HOURS AS NEEDED FOR SHORTNESS OF BREATH OR WHEEZING. DUE FOR FOLLOW UP 6/9/17  Yes Kenrick Wheeler PA-C   cetirizine (ZYRTEC) 10 MG tablet Take 1 tablet (10 mg) by mouth every evening 5/5/17  Yes Roberta Beaver MD   amLODIPine (NORVASC) 5 MG tablet Take 1 tablet (5 mg) by mouth daily 3/29/17   Yes Roberat Beaver MD   busPIRone (BUSPAR) 5 MG tablet Take 1 tablet (5 mg) by mouth 2 times daily 3/29/17  Yes Roberta Beaver MD   timolol (TIMOPTIC) 0.5 % ophthalmic solution Apply 1 drop to eye 2 times daily 3/25/17  Yes Reported, Patient   Ibuprofen (ADVIL PO) Take 400 mg by mouth   Yes Reported, Patient   triamcinolone (KENALOG) 0.1 % cream Apply thin layer to affected areas two times a day as needed for itch. Maximum 2 weeks use 11/30/16  Yes Roberta Beaver MD   omeprazole (PRILOSEC) 20 MG capsule Take 1 capsule (20 mg) by mouth daily 10/13/16  Yes Roberta Beaver MD   multivitamin, therapeutic with minerals (MULTI-VITAMIN) TABS Take 1 tablet by mouth daily   Yes Reported, Patient   meclizine (ANTIVERT) 25 MG tablet Take 1 tablet (25 mg) by mouth 3 times daily as needed for dizziness 8/20/15  Yes Roberta Beaver MD   TIMOLOL 0.25 % OP SOLN Reported on 3/29/2017 2/4/10  Yes Giancarlo Fairchild PA-C   ASPIRIN 81 MG OR TABS ONE DAILY 1/14/09  Yes Giancarlo Fairchild PA-C       Allergies   Allergen Reactions     No Known Drug Allergies        Social History     Occupational History     farmer      Social History Main Topics     Smoking status: Current Every Day Smoker     Packs/day: 1.00     Years: 52.00     Types: Cigarettes     Smokeless tobacco: Current User     Types: Chew      Comment: occasionally/ 1.5tin qwk     Alcohol use No      Comment: quit 7-1980     Drug use: No     Sexual activity: Yes     Partners: Female      Comment: not currently active       Family History   Problem Relation Age of Onset     CANCER Sister      brain tumor     DIABETES Sister      Alzheimer Disease Sister        REVIEW OF SYSTEMS  General: negative for fever or fatigue     Psych:  Patient does have a history of some anxiety.    Ophthalmic:  Corrective lenses?  No    ENT:  Hearing difficulty? Nothing significant. Mild hearing loss    CV: negative for chest pain, venous insuffiency, has some  "hypertension    Endocrine:  No diabetes     Urology:  negative kidney disease    Resp:  Normal respiratory effort , patient reports recent history of bronchitis. He is now taking inhalers on an as-needed basis    Skin:  Negative for cuts or redness, does have vitiligo    Musculoskeletal: as above    Neurologic:negative for numbness/tingling    Hematologic: negative for bleeding disorder, no use of prescription anticoagulants     Physical Exam:    Vitals: Temp 97.8  F (36.6  C) (Temporal)  Ht 1.765 m (5' 9.5\")  Wt 85.7 kg (189 lb)  BMI 27.51 kg/m2  BMI= Body mass index is 27.51 kg/(m^2).    GENERAL APPEARANCE:  Healthy, alert, no distress    SKIN:  No suspicious lesions or rashes, patient does have vitiligo    NEURO:  Normal strength and tone, mentation intact and speech normal    PSYCH:  Mentation appears normal and affect normal/bright    RESPIRATORY: No respiratory distress.    EYES: No Conjunctivitis    Cardiovascular: No significant dependent edema      GAIT: Antalgic    JOINT/EXTREMITIES:  Patient with some mild swelling about the knees but no redness  Patient does have pain with palpation at the medial joint line bilaterally  Bilaterally Patient with just a few degrees short of full extension. He does have functional flexion  Gait is antalgic    Radiographs: Bilateral x-rays are obtained. Patient with significant bone-on-bone medial compartment osteoarthritis.  Bone-on-bone nature is especially apparent with squatting views. Independent visualization of the films was made.     Impression:      ICD-10-CM    1. Primary osteoarthritis of both knees M17.0 Large Joint/Bursa injection and/or drainage (Shoulder, Knee)     Celestone 12 mg Inj. []     betamethasone acet & sod phos (CELESTONE) 6 (3-3) MG/ML SUSP injection   2. Pain in both knees, unspecified chronicity M25.561 XR Knee Bilateral G/E 4 Views    M25.562      Patient was significant bone-on-bone osteoarthritis bilateral knees of the medial " compartment. 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    Plan:  Patient is advised of the above condition of his knees and advised that injection therapies may or may not be effective with the significance of his osteoarthritis.  Patient would like to give injection a try. We would then recommend cortisone injection to make sure this is effective prior to any type of hyalin treatment. Patient prefers his left knee to be injected in clinic today as this is his dominant side for power of walking.    After risks and benefits were explained and questions answered, the patient agreed to undergo the recommended procedure .   Using aseptic technique the skin was prepped with betadine swabs, then sprayed with ethyl chloride for topical anesthesia.  The left  Knee  lateral infrapatellar: joint space was then infiltrated with 5cc of 1% Lidocaine without epinephrine and 2ml of Betamethasone.  There were no complications and the patient tolerated the procedure well.  Patient is advised it may take a few days for the cortisone to take effect. If he has a longer than expected relief from pain then we would consider hyalin injections in the future. If no significant effect and patient may need to consider possible aggressive total joint replacement.    Patient will follow up p.r.n.    Patient is evaluated with and plan developed in conjunction with Dr. Hunt    Scribed by  Shweta Davis PA-C   7/25/2017  10:40 AM        I attest I have seen and evaluated the patient.  I agree with above impression and plan.    Philippe Hunt MD

## 2017-07-25 NOTE — MR AVS SNAPSHOT
"              After Visit Summary   2017    Jerry Rodriguez    MRN: [id number]           Patient Information     Date Of Birth          1935        Visit Information        Provider Department      2017 9:50 AM Philippe Hunt MD [facility]        Today's Diagnoses     Primary osteoarthritis of both knees    -  1    Pain in both knees, unspecified chronicity           Follow-ups after your visit        Who to contact     If you have questions or need follow up information about today's clinic visit or your schedule please contact [facility] directly at [phone].  Normal or non-critical lab and imaging results will be communicated to you by SMARTECH MFGhart, letter or phone within 4 business days after the clinic has received the results. If you do not hear from us within 7 days, please contact the clinic through SMARTECH MFGhart or phone. If you have a critical or abnormal lab result, we will notify you by phone as soon as possible.  Submit refill requests through Metropia or call your pharmacy and they will forward the refill request to us. Please allow 3 business days for your refill to be completed.          Additional Information About Your Visit        MyChart Information     Metropia lets you send messages to your doctor, view your test results, renew your prescriptions, schedule appointments and more. To sign up, go to [email or web address] . Click on \"Log in\" on the left side of the screen, which will take you to the Welcome page. Then click on \"Sign up Now\" on the right side of the page.     You will be asked to enter the access code listed below, as well as some personal information. Please follow the directions to create your username and password.     Your access code is: [email or web address]  Expires: 2017  7:25 AM     Your access code will  in 90 days. If you need help or a new code, please call your [facility] or [phone].        Care EveryWhere ID  " "   This is your Care EveryWhere ID. This could be used by other organizations to access your [place] medical records  [id number]        Your Vitals Were     Temperature Height BMI (Body Mass Index)             97.8  F (36.6  C) (Temporal) 1.765 m (5' 9.5\") 27.51 kg/m2          Blood Pressure from Last 3 Encounters:   07/18/17 122/60   06/13/17 120/74   03/29/17 118/76    Weight from Last 3 Encounters:   07/25/17 85.7 kg (189 lb)   07/18/17 85.7 kg (189 lb)   06/13/17 89.8 kg (198 lb)               Primary Care Provider Office Phone # Fax #    Roberta Beaver [phone]       [facility]  [address] [facility] [address]        Equal Access to Services     TAYLER LOPEZ : Hadii tashia varma Sojacinto, waaxda luqadaha, qaybta kaalmada adegilsonyada, kenyatta nobles . So [facility] [phone].    ATENCIÓN: Si habla español, tiene a billingsley disposición servicios gratuitos de asistencia lingüística. Llame al [phone].    We comply with applicable federal civil rights laws and [place] laws. We do not discriminate on the basis of race, color, national origin, age, disability sex, sexual orientation or gender identity.            Thank you!     Thank you for choosing [facility]  for your care. Our goal is always to provide you with excellent care. Hearing back from our patients is one way we can continue to improve our services. Please take a few minutes to complete the written survey that you may receive in the mail after your visit with us. Thank you!             Your Updated Medication List - Protect others around you: Learn how to safely use, store and throw away your medicines at www.disposemymeds.org.          This list is accurate as of: 7/25/17 11:18 AM.  Always use your most recent med list.                   Brand Name Dispense Instructions for use Diagnosis    ADVIL PO      Take 400 mg by mouth        albuterol 108 (90 BASE) MCG/ACT Inhaler "    VENTOLIN HFA    18 g    INHALE TWO PUFFS BY MOUTH EVERY SIX HOURS AS NEEDED FOR SHORTNESS OF BREATH OR WHEEZING. DUE FOR FOLLOW UP    Cough       amLODIPine 5 MG tablet    NORVASC    90 tablet    Take 1 tablet (5 mg) by mouth daily    Essential hypertension       aspirin 81 MG tablet      ONE DAILY        busPIRone 5 MG tablet    BUSPAR    180 tablet    Take 1 tablet (5 mg) by mouth 2 times daily    Anxiety state       cetirizine 10 MG tablet    zyrTEC    30 tablet    Take 1 tablet (10 mg) by mouth every evening    Seasonal allergic rhinitis, unspecified allergic rhinitis trigger       fluticasone 220 MCG/ACT Inhaler    FLOVENT HFA    1 Inhaler    Inhale 2 puffs into the lungs 2 times daily    Simple chronic bronchitis (H)       meclizine 25 MG tablet    ANTIVERT    30 tablet    Take 1 tablet (25 mg) by mouth 3 times daily as needed for dizziness    Vertigo       Multi-vitamin Tabs tablet      Take 1 tablet by mouth daily        nystatin [id number] UNIT/ML suspension    MYCOSTATIN    60 mL    Take 5 mLs (500,000 Units) by mouth 4 times daily    Thrush       omeprazole 20 MG CR capsule    priLOSEC    90 capsule    Take 1 capsule (20 mg) by mouth daily    Gastroesophageal reflux disease without esophagitis       predniSONE 20 MG tablet    DELTASONE    5 tablet    Take 1 tablet (20 mg) by mouth daily    Primary osteoarthritis of both knees       timolol 0.5 % ophthalmic solution    TIMOPTIC     Apply 1 drop to eye 2 times daily        timolol hemihydrate 0.25 % Soln ophthalmic solution    BETIMOL     Reported on 3/29/2017        triamcinolone 0.1 % cream    KENALOG    80 g    Apply thin layer to affected areas two times a day as needed for itch. Maximum 2 weeks use    Dry skin       VALVED HOLDING CHAMBER [place]     1 each    1 Device 2 times daily To be used with flovent    Simple chronic bronchitis (H)

## 2017-07-25 NOTE — LETTER
7/25/2017         RE: Jerry Rodriguez  [address]  [facility] [id number]        Dear Colleague,    Thank you for referring your patient, Jerry Rodriguez, to the [facility]. Please see a copy of my visit note below.    ORTHOPEDIC CLINIC CONSULT      Jerry Rodriguez is a 82 year old male who is seen in consultation at the request of Self.    History of Present illness:  Jerry presents for evaluation of:   1.) Bilateral knee pain    Onset:  Many years    Symptoms brought on by unknown.   Location:  Bilateral knees.    Character:  dull ache.    Progression of symptoms:  worse.    Previous similar pain: no .   Pain Level:  7/10.   Previous treatments: Patient has tried analgesic balm's and Advil.  Patient had a primary care appointment last week in which injection of cortisone was attempted but was not able to obtain the joint space. Patient states his knee was in an extended position and 3 attempts were made by primary care provider  Currently on Blood thinners? Yes, ASA 81 mg  Diagnosis of Diabetes? No      Past Medical History:   Diagnosis Date     Anxiety state, unspecified      Hypertension      Hyponatremia      Inguinal hernia with obstruction, without mention of gangrene, unilateral or unspecified, (not specified as recurrent)     Strangulated right inguinal hernia years ago     Inguinal hernia without mention of obstruction or gangrene, unilateral or unspecified, (not specified as recurrent) 11/07/2000    Left inguinal hernia, sliding and not incarcerated     Other joint derangement, not elsewhere classified, forearm 1980    traumatic fracture     Pathologic fracture of neck of femur (H)     Surgery for hip fracture     Stricture and stenosis of esophagus 2004       Past Surgical History:   Procedure Laterality Date     C OSTEOTOMY HIP/FEMUR  1980    traumatic fracture right hip     COLONOSCOPY  10/17/2007    Repeat Colonoscopy in 5 years for surveillance.     ESOPHAGOSCOPY,  GASTROSCOPY, DUODENOSCOPY (EGD), COMBINED  1/4/2011    COMBINED ESOPHAGOSCOPY, GASTROSCOPY, DUODENOSCOPY (EGD), ESOPHAGEAL DILATION BALLOON LESS THAN 30MM performed by ELEN BUSBY at  GI     ESOPHAGOSCOPY, GASTROSCOPY, DUODENOSCOPY (EGD), COMBINED N/A 9/25/2015    Procedure: COMBINED ESOPHAGOSCOPY, GASTROSCOPY, DUODENOSCOPY (EGD), BIOPSY SINGLE OR MULTIPLE;  Surgeon: Markell Lawson MD;  Location: [facility]     HC COLONOSCOPY THRU STOMA, DIAGNOSTIC  11/15/2000    Recurrent abdominal pain     HC COLONOSCOPY W/WO BRUSH/WASH  07/19/2004     HC REPAIR INCISIONAL HERNIA,REDUCIBLE  11/10/2000    Hernia Repair, Incisional, Unilateral, Left inguinal hernia and umbilical hernia     HC UGI ENDOSCOPY DIAG W OR W/O BRUSH/WASH  07/19/2004     HC UGI ENDOSCOPY DIAG W OR W/O BRUSH/WASH  05/24/2005    Peptic stricture of esophagus, dilated to 18 mm. Erosive reflux esophagitis. Hiatal hernia. Erosive duodenitis.      UGI ENDOSCOPY, SIMPLE EXAM  02/20/07      UGI ENDOSCOPY, SIMPLE EXAM  12/02/08    Schiatzky's ring, dialated, PPI indefinitely      UGI ENDOSCOPY, SIMPLE EXAM  12/15/2009    benign stricture with dialation     LAPAROSCOPIC APPENDECTOMY  09/15/2005       Home Medications:  Prior to Admission medications    Medication Sig Start Date End Date Taking? Authorizing Provider   Spacer/Aero-Holding Chambers (VALVED HOLDING CHAMBER) JULISSA 1 Device 2 times daily To be used with flovent 7/24/17  Yes Roberta Beaver MD   nystatin (MYCOSTATIN) 525549 UNIT/ML suspension Take 5 mLs (500,000 Units) by mouth 4 times daily 7/18/17  Yes Mally Farnsworth MD   predniSONE (DELTASONE) 20 MG tablet Take 1 tablet (20 mg) by mouth daily 7/18/17  Yes Mally Farnsworth MD   fluticasone (FLOVENT HFA) 220 MCG/ACT Inhaler Inhale 2 puffs into the lungs 2 times daily 6/21/17  Yes Roberta Beaver MD   albuterol (VENTOLIN HFA) 108 (90 BASE) MCG/ACT Inhaler INHALE TWO PUFFS BY MOUTH EVERY SIX HOURS AS NEEDED FOR SHORTNESS OF BREATH OR WHEEZING.  DUE FOR FOLLOW UP 6/9/17  Yes Kenrick Wheeler PA-C   cetirizine (ZYRTEC) 10 MG tablet Take 1 tablet (10 mg) by mouth every evening 5/5/17  Yes Roberta Beaver MD   amLODIPine (NORVASC) 5 MG tablet Take 1 tablet (5 mg) by mouth daily 3/29/17  Yes Roberta Beaver MD   busPIRone (BUSPAR) 5 MG tablet Take 1 tablet (5 mg) by mouth 2 times daily 3/29/17  Yes Roberta Beaver MD   timolol (TIMOPTIC) 0.5 % ophthalmic solution Apply 1 drop to eye 2 times daily 3/25/17  Yes Reported, Patient   Ibuprofen (ADVIL PO) Take 400 mg by mouth   Yes Reported, Patient   triamcinolone (KENALOG) 0.1 % cream Apply thin layer to affected areas two times a day as needed for itch. Maximum 2 weeks use 11/30/16  Yes Roberta Beaver MD   omeprazole (PRILOSEC) 20 MG capsule Take 1 capsule (20 mg) by mouth daily 10/13/16  Yes Roberta Beaver MD   multivitamin, therapeutic with minerals (MULTI-VITAMIN) TABS Take 1 tablet by mouth daily   Yes Reported, Patient   meclizine (ANTIVERT) 25 MG tablet Take 1 tablet (25 mg) by mouth 3 times daily as needed for dizziness 8/20/15  Yes Roberta Beaver MD   TIMOLOL 0.25 % OP SOLN Reported on 3/29/2017 2/4/10  Yes Giancarlo Fairchild PA-C   ASPIRIN 81 MG OR TABS ONE DAILY 1/14/09  Yes Giancarlo Fairchild PA-C       Allergies   Allergen Reactions     No Known Drug Allergies        Social History     Occupational History     farmer      Social History Main Topics     Smoking status: Current Every Day Smoker     Packs/day: 1.00     Years: 52.00     Types: Cigarettes     Smokeless tobacco: Current User     Types: Chew      Comment: occasionally/ 1.5tin qwk     Alcohol use No      Comment: quit 7-1980     Drug use: No     Sexual activity: Yes     Partners: Female      Comment: not currently active       Family History   Problem Relation Age of Onset     CANCER Sister      brain tumor     DIABETES Sister      Alzheimer Disease Sister        REVIEW OF SYSTEMS  General: negative for fever or  "fatigue     Psych:  Patient does have a history of some anxiety.    Ophthalmic:  Corrective lenses?  No    ENT:  Hearing difficulty? Nothing significant. Mild hearing loss    CV: negative for chest pain, venous insuffiency, has some hypertension    Endocrine:  No diabetes     Urology:  negative kidney disease    Resp:  Normal respiratory effort , patient reports recent history of bronchitis. He is now taking inhalers on an as-needed basis    Skin:  Negative for cuts or redness, does have vitiligo    Musculoskeletal: as above    Neurologic:negative for numbness/tingling    Hematologic: negative for bleeding disorder, no use of prescription anticoagulants     Physical Exam:    Vitals: Temp 97.8  F (36.6  C) (Temporal)  Ht 1.765 m (5' 9.5\")  Wt 85.7 kg (189 lb)  BMI 27.51 kg/m2  BMI= Body mass index is 27.51 kg/(m^2).    GENERAL APPEARANCE:  Healthy, alert, no distress    SKIN:  No suspicious lesions or rashes, patient does have vitiligo    NEURO:  Normal strength and tone, mentation intact and speech normal    PSYCH:  Mentation appears normal and affect normal/bright    RESPIRATORY: No respiratory distress.    EYES: No Conjunctivitis    Cardiovascular: No significant dependent edema      GAIT: Antalgic    JOINT/EXTREMITIES:  Patient with some mild swelling about the knees but no redness  Patient does have pain with palpation at the medial joint line bilaterally  Bilaterally Patient with just a few degrees short of full extension. He does have functional flexion  Gait is antalgic    Radiographs: Bilateral x-rays are obtained. Patient with significant bone-on-bone medial compartment osteoarthritis.  Bone-on-bone nature is especially apparent with squatting views. Independent visualization of the films was made.     Impression:      ICD-10-CM    1. Primary osteoarthritis of both knees M17.0 Large Joint/Bursa injection and/or drainage (Shoulder, Knee)     Celestone 12 mg Inj. []     betamethasone acet & sod phos " (CELESTONE) 6 (3-3) MG/ML SUSP injection   2. Pain in both knees, unspecified chronicity M25.561 XR Knee Bilateral G/E 4 Views    M25.562      Patient was significant bone-on-bone osteoarthritis bilateral knees of the medial compartment. 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    Plan:  Patient is advised of the above condition of his knees and advised that injection therapies may or may not be effective with the significance of his osteoarthritis.  Patient would like to give injection a try. We would then recommend cortisone injection to make sure this is effective prior to any type of hyalin treatment. Patient prefers his left knee to be injected in clinic today as this is his dominant side for power of walking.    After risks and benefits were explained and questions answered, the patient agreed to undergo the recommended procedure .   Using aseptic technique the skin was prepped with betadine swabs, then sprayed with ethyl chloride for topical anesthesia.  The left  Knee  lateral infrapatellar: joint space was then infiltrated with 5cc of 1% Lidocaine without epinephrine and 2ml of Betamethasone.  There were no complications and the patient tolerated the procedure well.  Patient is advised it may take a few days for the cortisone to take effect. If he has a longer than expected relief from pain then we would consider hyalin injections in the future. If no significant effect and patient may need to consider possible aggressive total joint replacement.    Patient will follow up p.r.n.    Patient is evaluated with and plan developed in conjunction with Dr. Hunt    Scribed by  Shweta Davis PA-C   7/25/2017  10:40 AM        I attest I have seen and evaluated the patient.  I agree with above impression and plan.    Philippe Hunt MD              Again, thank you for allowing me to participate in the care of your patient.         Sincerely,        Philippe Hunt MD

## 2017-07-26 ENCOUNTER — TELEPHONE (OUTPATIENT)
Dept: FAMILY MEDICINE | Facility: OTHER | Age: 82
End: 2017-07-26

## 2017-07-26 NOTE — TELEPHONE ENCOUNTER
Reason for call:  Symptom  Reason for call:  Patient reporting a symptom    Symptom or request: patient calling stating the inhaler he is using for bronchitis at night is not working out for him.  He is having trouble using it.  Thank you    Duration (how long have symptoms been present): ongoing    Have you been treated for this before? Yes    Additional comments: n/a    Phone Number patient can be reached at:  Home number on file [phone] (home)    Best Time:  any    Can we leave a detailed message on this number:  YES    Call taken on 7/26/2017 at 3:46 PM by Viktoria Terry

## 2017-07-26 NOTE — TELEPHONE ENCOUNTER
"Jerry Rodriguez is a 82 year old male who calls with inhaler concerns.    NURSING ASSESSMENT:  Description:  Having trouble using his inhaler: Albuterol. Patient is requesting to use the \"liquid form\" of a medication for bronchitis such as Robitussin - stated unable to take medication with alcohol in them. Coughing after about 2 hours of sleep and then coughs all night.   Onset/duration:  Ongoing use of an albuterol inhaler for his cough  Last exam/Treatment:  07/18/2017  Allergies:   Allergies   Allergen Reactions     No Known Drug Allergies      NURSING PLAN: Nursing advice to patient to be seen in clinic tomorrow    RECOMMENDED DISPOSITION:  To be seen in clinic tomorrow  Will comply with recommendation: Yes   Next 5 appointments (look out 90 days)     Jul 27, 2017 10:15 AM CDT   Office Visit with Marsha Palumbo PA-C   [facility]    [address] [place] [address] [id number] [phone]              If further questions/concerns or if symptoms do not improve, worsen or new symptoms develop, call your PCP or [facility] as soon as possible.    NOTES:  Disposition was determined by the first positive assessment question, therefore all previous assessment questions were negative    Guideline used:  Telephone Triage Protocols for Nurses, Fifth Edition, Lore Garcia  Cough  Nursing Judgment    Mary Hook RN, BSN       "

## 2017-07-27 ENCOUNTER — OFFICE VISIT (OUTPATIENT)
Dept: FAMILY MEDICINE | Facility: OTHER | Age: 82
End: 2017-07-27
Payer: COMMERCIAL

## 2017-07-27 VITALS
OXYGEN SATURATION: 98 % | WEIGHT: 194 LBS | TEMPERATURE: 97.6 F | DIASTOLIC BLOOD PRESSURE: 66 MMHG | HEART RATE: 68 BPM | RESPIRATION RATE: 16 BRPM | SYSTOLIC BLOOD PRESSURE: 104 MMHG | BODY MASS INDEX: 28.24 KG/M2

## 2017-07-27 DIAGNOSIS — J41.0 SIMPLE CHRONIC BRONCHITIS (H): Primary | ICD-10-CM

## 2017-07-27 PROCEDURE — 99214 OFFICE O/P EST MOD 30 MIN: CPT | Performed by: PHYSICIAN ASSISTANT

## 2017-07-27 RX ORDER — FLUTICASONE PROPIONATE AND SALMETEROL XINAFOATE 115; 21 UG/1; UG/1
2 AEROSOL, METERED RESPIRATORY (INHALATION) 2 TIMES DAILY
Qty: 12 G | Refills: 1 | Status: SHIPPED | OUTPATIENT
Start: 2017-07-27 | End: 2017-08-10

## 2017-07-27 ASSESSMENT — PAIN SCALES - GENERAL: PAINLEVEL: NO PAIN (0)

## 2017-07-27 NOTE — MR AVS SNAPSHOT
After Visit Summary   7/27/2017    Jerry Rodriguez    MRN: [id number]           Patient Information     Date Of Birth          1935        Visit Information        Provider Department      7/27/2017 10:15 AM Marsha Palumbo PA-C [facility]        Today's Diagnoses     Simple chronic bronchitis (H)    -  1      Care Instructions    - STOP Flovent (blue one)   - START Advair   - CONTINUE with Albuterol (RED ONE - Proair) with spacer as needed      Can use every 2 hours when coughing   - Recheck 2 weeks                           Chronic Obstructive Pulmonary Disease (COPD)            What is chronic obstructive pulmonary disease (COPD)?   Chronic obstructive pulmonary disease (COPD) is a condition in which some of your airways are permanently blocked. COPD makes it harder for you to breathe. It causes strain on your heart. It increases the blood pressure in your lungs (pulmonary hypertension) and makes your heart get bigger (cor pulmonale).   How does it occur?   There are 2 main types of COPD: chronic bronchitis (inflamed airways) and emphysema (damage to the lung tissue). Chronic bronchitis and emphysema result from irritation of your airways over a long time, usually from smoking and sometimes from air pollution. Other causes are on-the-job exposure to irritants such as dust or chemicals, or frequent lung infections.   Chronic bronchitis and emphysema can occur separately but often they develop together. In chronic bronchitis, the insides of the airways thicken and swell. This makes the passageway for air smaller. The damaged airways make more mucus, which can block the airways and make it hard to breathe. In emphysema, the tiny air sacs (alveoli) in the lungs may become badly damaged or destroyed and lose their ability to stretch (get bigger and smaller). This makes it harder for you to breathe out carbon dioxide after breathing in air. As the carbon dioxide  collects in your lungs, there is less room for oxygen to be breathed in.   COPD is not contagious. You cannot give it to someone or get it from someone else.   What are the symptoms?   Symptoms of COPD include:   deep, persistent cough that produces lots of mucus (sputum)   thick sputum that is hard to cough up   wheezing   shortness of breath, trouble breathing   rapid breathing   blue-purple color of the skin (cyanosis), especially of the fingers, toes, and lips   weight loss   frequent lung infections   swelling in the legs, ankles, and feet.   In the early stages of the disease you may not have any symptoms.   How is it diagnosed?   Your healthcare provider will ask you about:   your symptoms and if you are less active because of the symptoms   your smoking habits   exposure to other people's smoke (secondhand smoke) and other irritants such as aerosol sprays, industrial chemicals, and air pollution   your medical history, for example, if you have had asthma.   Your healthcare provider will examine you. You may have the following tests:   a breathing test called a PFT (pulmonary function test) or spirometry (you breathe into a tube to measure airflow into and out of your lungs to see how well your lungs are working)   chest X-ray   blood tests   lab tests of sputum to look for infection.   How is it treated?   The damage to your lungs cannot be reversed, so treatment goals are:   Relieve symptoms to help you breathe better and feel better.   Help you be more physically active.   Treat infections when they happen.   Help prevent complications.   Help prevent the condition from getting worse.   If you are a smoker, the most important part of your treatment is to quit smoking. Talk to your healthcare provider about ways to stop smoking. You might find it helpful to join a quit-smoking program, to use nicotine patches or gum, or to try one of the prescription medicines that can help you quit.   Your healthcare  provider may prescribe:   Medicine that relaxes and opens the airways (called a bronchodilator). This makes it easier to breathe. Some forms of this medicine are taken as pills or liquid. Some are inhaled. Some need to be used with a nebulizer. A nebulizer is a machine that makes a mist of the bronchodilator medicine so you can inhale it through a face mask or breathing tube.   Steroid medicines to reduce inflammation.   Antibiotics to treat bacterial infection.   Medicine (called an expectorant) that loosens the mucus and helps you cough it up.   Ask your healthcare provider if you would benefit from:   regular exercise, such as walking or riding a stationary bicycle, according to your healthcare provider's recommendations   breathing exercises   a humidifier to increase air moisture   changes in your work environment to reduce exposure to irritants   oxygen therapy.   Your need for oxygen is not based on being short of breath. You may need oxygen and not feel short of breath, or you may feel very short of breath and not need oxygen. The need for oxygen is found by measuring the amount of oxygen in your blood. If the oxygen level is too low, your heart can be damaged. Some people whose oxygen levels are low all the time may need oxygen nearly 24 hours a day to help prevent heart failure and help them live longer. Other people need to use oxygen only during activity, or when they sleep, because these are the times when the oxygen level in their blood goes too low. It can help to talk with your provider about your concerns and ask him or her if oxygen is recommended for you.   Also ask your healthcare provider how much fluid you should drink every day.   A pulmonary rehab program can help you learn how to live and feel better with COPD. The program may offer supervised exercise and information about a healthy diet. It can help you learn about how your lungs work and how to care for your COPD. Ask your provider if  there is such a program in your area.   In rare cases of severe COPD, surgery may be an option. Surgery can remove the most diseased part of the lungs, or a lung transplant might be considered, depending on your overall health.   How long will the effects last?   COPD cannot be cured. Once you have COPD, it does not get better, but taking good care of yourself is the best way to slow the progress of the disease.   The best way to take care of yourself is to avoid things that may have caused the COPD, such as tobacco smoke or exposure to dust, fumes, or chemicals at the workplace. This will give you the greatest chance of stopping the disease from getting worse.   How can I take care of myself?   Follow these guidelines to take care of yourself:   If you smoke, quit.   Follow your healthcare provider's advice for treating COPD. Take all of your medicine according to your provider's instructions.   Avoid secondhand smoke, air pollution, and extreme changes in temperature and humidity.   Ask about getting a yearly flu shot.   Ask about getting a shot to prevent some types of pneumonia (Pneumovax).   Avoid close contact with people who have colds or the flu.   Eat healthy foods.   Eat high-calorie snacks between meals if you are underweight.   Take vitamin and mineral supplements if recommended by your healthcare provider.   Ask your provider about doing some physical activity every day. If you are not used to being physically active, ask about exercise training, such as pulmonary rehab, to safely build up your strength.   Get plenty of rest and sleep.   Consider lifestyle changes such as changing jobs or moving to a less polluted climate or lower altitude.   If you plan to travel, discuss your plans with your healthcare provider. It's good to make sure there will be no problems with high altitude, humidity, temperature, pressurized airplane cabins, or smoggy cities, especially if you are using oxygen.   A flare-up  (exacerbation) is a worsening of the usual symptoms of COPD. You, or sometimes a family member, are usually the first to know when your lung disease is getting worse. Watch for:   shortness of breath that gets worse   more coughing than usual, often with chest tightness   coughing up more sputum   a change in how the sputum looks, such as a change to a darker color or streaks of blood   sputum that has gotten thicker and stickier and harder to cough up   new or worsening wheezing   not being able to do as much activity   being more short of breath during or after physical activity   fever   swelling of your legs   not being able to sleep well because of shortness of breath   chest pain or discomfort that is new or getting worse   feeling drowsy or not able to think clearly.   Any of these symptoms might be a warning sign of a flare-up. If you catch these changes right away and get prompt treatment, you may be able to prevent a trip to the hospital. Ask your healthcare provider for instructions about what to do when you have these symptoms and if there are any other symptoms you should watch fort.   When should I get emergency help?   It is important to know the difference between earlier signs and symptoms of sickness and the signs of an emergency. You should call 911 to get emergency help if:   You cannot talk because you are so short of breath.   It is hard to walk because you are short of breath.   Your lips or fingernails turn gray or blue.   Your heart is beating very fast.   Your medicine does not help for very long or does not help at all.   You are breathing fast and hard.   You feel like you are going to die.   You are having bad chest pain that does not go away within about 5 minutes.   Do not turn up your oxygen unless your healthcare provider tells you to do this.   How can I help prevent COPD?   85% of COPD cases are caused by tobacco smoke. This includes the smokers themselves and people who are  exposed to secondhand smoke. In most cases you can prevent COPD by never smoking and not being around others who are smoking.       Published by [employer].  This content is reviewed periodically and is subject to change as new health information becomes available. The information is intended to inform and educate and is not a replacement for medical evaluation, advice, diagnosis or treatment by a healthcare professional.   Developed by [employer].   ? 2010 [facility] and/or its affiliates. All Rights Reserved.   Copyright   [employer] 2011                Follow-ups after your visit        Your next 10 appointments already scheduled     Aug 10, 2017 11:00 AM CDT   [facility] with Roberta Beaver MD   [facility]    [address]  [facility] [id number] [phone]           Bring a current list of meds and any records pertaining to this visit. For Physicals, please bring immunization records and any forms needing to be filled out. Please arrive 10 minutes early to complete paperwork.              Who to contact     If you have questions or need follow up information about today's clinic visit or your schedule please contact [facility] directly at [phone].  Normal or non-critical lab and imaging results will be communicated to you by [employer], letter or phone within 4 business days after the clinic has received the results. If you do not hear from us within 7 days, please contact the clinic through 2sm[employer] or phone. If you have a critical or abnormal lab result, we will notify you by phone as soon as possible.  Submit refill requests through [employer] or call your pharmacy and they will forward the refill request to us. Please allow 3 business days for your refill to be completed.          Additional Information About Your Visit        MiName Information     MiName lets you send messages to your doctor, view your test  "results, renew your prescriptions, schedule appointments and more. To sign up, go to [email or web address] . Click on \"Log in\" on the left side of the screen, which will take you to the Welcome page. Then click on \"Sign up Now\" on the right side of the page.     You will be asked to enter the access code listed below, as well as some personal information. Please follow the directions to create your username and password.     Your access code is: [email or web address]  Expires: 2017  7:25 AM     Your access code will  in 90 days. If you need help or a new code, please call your [place] clinic or [phone].        Care EveryWhere ID     This is your Care EveryWhere ID. This could be used by other organizations to access your [place] medical records  [id number]        Your Vitals Were     Pulse Temperature Respirations Pulse Oximetry BMI (Body Mass Index)       68 97.6  F (36.4  C) (Oral) 16 98% 28.24 kg/m2        Blood Pressure from Last 3 Encounters:   17 104/66   17 122/60   17 120/74    Weight from Last 3 Encounters:   17 194 lb (88 kg)   17 189 lb (85.7 kg)   17 189 lb (85.7 kg)              We Performed the Following     COPD ACTION PLAN - order for Health Maintenance          Today's Medication Changes          These changes are accurate as of: 17 11:01 AM.  If you have any questions, ask your nurse or doctor.               Start taking these medicines.        Dose/Directions    fluticasone-salmeterol 115-21 MCG/ACT Inhaler   Commonly known as:  ADVAIR-HFA   Used for:  Simple chronic bronchitis (H)   Started by:  Marsha Palumbo PA-C        Dose:  2 puff   Inhale 2 puffs into the lungs 2 times daily   Quantity:  12 g   Refills:  1            Where to get your medicines      These medications were sent to [facility] [address] [place] [id number] [address] [address]     Phone:  [phone]     " fluticasone-salmeterol 115-21 MCG/ACT Inhaler                Primary Care Provider Office Phone # Fax #    Roberta Beaver [phone]       [facility]  [address] [address]        Equal Access to Services     TAYLER LOPEZ : Hadii tashia samson hadasho Soomaali, waaxda luqadaha, qaybta kaalmada adeegyada, waxjaquelin valenciahansjessica vallecillo. So [facility] [phone].    ATENCIÓN: Si habla español, tiene a billingsley disposición servicios gratuitos de asistencia lingüística. Llame al [phone].    We comply with applicable federal civil rights laws and [place] laws. We do not discriminate on the basis of race, color, national origin, age, disability sex, sexual orientation or gender identity.            Thank you!     Thank you for choosing [facility]  for your care. Our goal is always to provide you with excellent care. Hearing back from our patients is one way we can continue to improve our services. Please take a few minutes to complete the written survey that you may receive in the mail after your visit with us. Thank you!             Your Updated Medication List - Protect others around you: Learn how to safely use, store and throw away your medicines at www.disposemymeds.org.          This list is accurate as of: 7/27/17 11:01 AM.  Always use your most recent med list.                   Brand Name Dispense Instructions for use Diagnosis    ADVIL PO      Take 400 mg by mouth        albuterol 108 (90 BASE) MCG/ACT Inhaler    VENTOLIN HFA    18 g    INHALE TWO PUFFS BY MOUTH EVERY SIX HOURS AS NEEDED FOR SHORTNESS OF BREATH OR WHEEZING. DUE FOR FOLLOW UP    Cough       amLODIPine 5 MG tablet    NORVASC    90 tablet    Take 1 tablet (5 mg) by mouth daily    Essential hypertension       aspirin 81 MG tablet      ONE DAILY        betamethasone acet & sod phos 6 (3-3) MG/ML Susp injection    CELESTONE    2 mL    2 mLs (12 mg) by INTRA-ARTICULAR route once for 1  dose    Primary osteoarthritis of both knees       busPIRone 5 MG tablet    BUSPAR    180 tablet    Take 1 tablet (5 mg) by mouth 2 times daily    Anxiety state       cetirizine 10 MG tablet    zyrTEC    30 tablet    Take 1 tablet (10 mg) by mouth every evening    Seasonal allergic rhinitis, unspecified allergic rhinitis trigger       fluticasone 220 MCG/ACT Inhaler    FLOVENT HFA    1 Inhaler    Inhale 2 puffs into the lungs 2 times daily    Simple chronic bronchitis (H)       fluticasone-salmeterol 115-21 MCG/ACT Inhaler    ADVAIR-HFA    12 g    Inhale 2 puffs into the lungs 2 times daily    Simple chronic bronchitis (H)       meclizine 25 MG tablet    ANTIVERT    30 tablet    Take 1 tablet (25 mg) by mouth 3 times daily as needed for dizziness    Vertigo       Multi-vitamin Tabs tablet      Take 1 tablet by mouth daily        nystatin 024639 UNIT/ML suspension    MYCOSTATIN    60 mL    Take 5 mLs (500,000 Units) by mouth 4 times daily    Thrush       omeprazole 20 MG CR capsule    priLOSEC    90 capsule    Take 1 capsule (20 mg) by mouth daily    Gastroesophageal reflux disease without esophagitis       timolol 0.5 % ophthalmic solution    TIMOPTIC     Apply 1 drop to eye 2 times daily        timolol hemihydrate 0.25 % Soln ophthalmic solution    BETIMOL     Reported on 3/29/2017        triamcinolone 0.1 % cream    KENALOG    80 g    Apply thin layer to affected areas two times a day as needed for itch. Maximum 2 weeks use    Dry skin       VALVED HOLDING CHAMBER [place]     1 each    1 Device 2 times daily To be used with flovent    Simple chronic bronchitis (H)

## 2017-07-27 NOTE — LETTER
My COPD Action Plan   Name: Jerry Rodriguez    YOB: 1935   Date: 7/27/2017    My doctor: Marsha Palumbo PA-C   My clinic: [address] [address]  [phone]  My Controller Medicine: Fluticasone (Flovent)   Dose:      My Rescue Medicine: Albuterol (Proair/Ventolin/Proventil) inhaler   Dose:      My Flare Up Medicine: None, needs clinic appointment   Dose:   My COPD Severity: Moderate = FeV1 < 79% -50%     Use of Oxygen: Oxygen Not Prescribed         GREEN ZONE       Doing well today      Usual level of activity and exercise    Usual amount of cough and mucus    No shortness of breath    Usual level of health (thinking clearly, sleeping well, feel like eating) Actions:      Take daily medicines    Use oxygen as prescribed    Follow regular exercise and diet plan    Avoid cigarette smoke and other irritants that harm the lungs           YELLOW ZONE          Having a bad day or flare up      Short of breath more than usual    A lot more sputum (mucus) than usual    Sputum looks yellow, green, tan, brown or bloody    More coughing or wheezing    Fever or chills    Less energy; trouble completing activities    Trouble thinking or focusing    Using quick relief inhaler or nebulizer more often    Poor sleep; symptoms wake me up    Do not feel like eating Actions:      Get plenty of rest    Take daily medicines    Use quick relief inhaler every 2 hours    If you use oxygen, call you doctor to see if you should adjust your oxygen    Do breathing exercises or other things to help you relax    Let a loved one, friend or neighbor know you are feeling worse    Call your care team if you have 2 or more symptoms.  Start taking steroids or antibiotics if directed by your care team           RED ZONE       Need medical care now      Severe shortness of breath (feel you can't breathe)    Fever, chills    Not enough breath to do any  activity    Trouble coughing up mucus, walking or talking    Blood in mucus    Frequent coughing   Rescue medicines are not working    Not able to sleep because of breathing    Feel confused or drowsy    Chest pain    Actions:      Call your health care team.  If you cannot reach your care team, call 911 or go to the emergency room.        Electronically signed by: Marsha Palumbo, July 27, 2017  Annual Reminders:  Meet with Care Team, Flu Shot every Fall and Pneumonia Shot at least once  Pharmacy: [facility] [address] [place] [id number] [facility]

## 2017-07-27 NOTE — NURSING NOTE
"Chief Complaint   Patient presents with     Chronic Cough       Initial /66 (BP Location: Left arm, Patient Position: Chair, Cuff Size: Adult Regular)  Pulse 68  Temp 97.6  F (36.4  C) (Oral)  Resp 16  Wt 194 lb (88 kg)  SpO2 98%  BMI 28.24 kg/m2 Estimated body mass index is 28.24 kg/(m^2) as calculated from the following:    Height as of 7/25/17: 5' 9.5\" (1.765 m).    Weight as of this encounter: 194 lb (88 kg).  Medication Reconciliation: complete  "

## 2017-07-27 NOTE — PATIENT INSTRUCTIONS
- STOP Flovent (blue one)   - START Advair   - CONTINUE with Albuterol (RED ONE - Proair) with spacer as needed      Can use every 2 hours when coughing   - Recheck 2 weeks                           Chronic Obstructive Pulmonary Disease (COPD)            What is chronic obstructive pulmonary disease (COPD)?   Chronic obstructive pulmonary disease (COPD) is a condition in which some of your airways are permanently blocked. COPD makes it harder for you to breathe. It causes strain on your heart. It increases the blood pressure in your lungs (pulmonary hypertension) and makes your heart get bigger (cor pulmonale).   How does it occur?   There are 2 main types of COPD: chronic bronchitis (inflamed airways) and emphysema (damage to the lung tissue). Chronic bronchitis and emphysema result from irritation of your airways over a long time, usually from smoking and sometimes from air pollution. Other causes are on-the-job exposure to irritants such as dust or chemicals, or frequent lung infections.   Chronic bronchitis and emphysema can occur separately but often they develop together. In chronic bronchitis, the insides of the airways thicken and swell. This makes the passageway for air smaller. The damaged airways make more mucus, which can block the airways and make it hard to breathe. In emphysema, the tiny air sacs (alveoli) in the lungs may become badly damaged or destroyed and lose their ability to stretch (get bigger and smaller). This makes it harder for you to breathe out carbon dioxide after breathing in air. As the carbon dioxide collects in your lungs, there is less room for oxygen to be breathed in.   COPD is not contagious. You cannot give it to someone or get it from someone else.   What are the symptoms?   Symptoms of COPD include:   deep, persistent cough that produces lots of mucus (sputum)   thick sputum that is hard to cough up   wheezing   shortness of breath, trouble breathing   rapid breathing    blue-purple color of the skin (cyanosis), especially of the fingers, toes, and lips   weight loss   frequent lung infections   swelling in the legs, ankles, and feet.   In the early stages of the disease you may not have any symptoms.   How is it diagnosed?   Your healthcare provider will ask you about:   your symptoms and if you are less active because of the symptoms   your smoking habits   exposure to other people's smoke (secondhand smoke) and other irritants such as aerosol sprays, industrial chemicals, and air pollution   your medical history, for example, if you have had asthma.   Your healthcare provider will examine you. You may have the following tests:   a breathing test called a PFT (pulmonary function test) or spirometry (you breathe into a tube to measure airflow into and out of your lungs to see how well your lungs are working)   chest X-ray   blood tests   lab tests of sputum to look for infection.   How is it treated?   The damage to your lungs cannot be reversed, so treatment goals are:   Relieve symptoms to help you breathe better and feel better.   Help you be more physically active.   Treat infections when they happen.   Help prevent complications.   Help prevent the condition from getting worse.   If you are a smoker, the most important part of your treatment is to quit smoking. Talk to your healthcare provider about ways to stop smoking. You might find it helpful to join a quit-smoking program, to use nicotine patches or gum, or to try one of the prescription medicines that can help you quit.   Your healthcare provider may prescribe:   Medicine that relaxes and opens the airways (called a bronchodilator). This makes it easier to breathe. Some forms of this medicine are taken as pills or liquid. Some are inhaled. Some need to be used with a nebulizer. A nebulizer is a machine that makes a mist of the bronchodilator medicine so you can inhale it through a face mask or breathing tube.   Steroid  medicines to reduce inflammation.   Antibiotics to treat bacterial infection.   Medicine (called an expectorant) that loosens the mucus and helps you cough it up.   Ask your healthcare provider if you would benefit from:   regular exercise, such as walking or riding a stationary bicycle, according to your healthcare provider's recommendations   breathing exercises   a humidifier to increase air moisture   changes in your work environment to reduce exposure to irritants   oxygen therapy.   Your need for oxygen is not based on being short of breath. You may need oxygen and not feel short of breath, or you may feel very short of breath and not need oxygen. The need for oxygen is found by measuring the amount of oxygen in your blood. If the oxygen level is too low, your heart can be damaged. Some people whose oxygen levels are low all the time may need oxygen nearly 24 hours a day to help prevent heart failure and help them live longer. Other people need to use oxygen only during activity, or when they sleep, because these are the times when the oxygen level in their blood goes too low. It can help to talk with your provider about your concerns and ask him or her if oxygen is recommended for you.   Also ask your healthcare provider how much fluid you should drink every day.   A pulmonary rehab program can help you learn how to live and feel better with COPD. The program may offer supervised exercise and information about a healthy diet. It can help you learn about how your lungs work and how to care for your COPD. Ask your provider if there is such a program in your area.   In rare cases of severe COPD, surgery may be an option. Surgery can remove the most diseased part of the lungs, or a lung transplant might be considered, depending on your overall health.   How long will the effects last?   COPD cannot be cured. Once you have COPD, it does not get better, but taking good care of yourself is the best way to slow the  progress of the disease.   The best way to take care of yourself is to avoid things that may have caused the COPD, such as tobacco smoke or exposure to dust, fumes, or chemicals at the workplace. This will give you the greatest chance of stopping the disease from getting worse.   How can I take care of myself?   Follow these guidelines to take care of yourself:   If you smoke, quit.   Follow your healthcare provider's advice for treating COPD. Take all of your medicine according to your provider's instructions.   Avoid secondhand smoke, air pollution, and extreme changes in temperature and humidity.   Ask about getting a yearly flu shot.   Ask about getting a shot to prevent some types of pneumonia (Pneumovax).   Avoid close contact with people who have colds or the flu.   Eat healthy foods.   Eat high-calorie snacks between meals if you are underweight.   Take vitamin and mineral supplements if recommended by your healthcare provider.   Ask your provider about doing some physical activity every day. If you are not used to being physically active, ask about exercise training, such as pulmonary rehab, to safely build up your strength.   Get plenty of rest and sleep.   Consider lifestyle changes such as changing jobs or moving to a less polluted climate or lower altitude.   If you plan to travel, discuss your plans with your healthcare provider. It's good to make sure there will be no problems with high altitude, humidity, temperature, pressurized airplane cabins, or smoggy cities, especially if you are using oxygen.   A flare-up (exacerbation) is a worsening of the usual symptoms of COPD. You, or sometimes a family member, are usually the first to know when your lung disease is getting worse. Watch for:   shortness of breath that gets worse   more coughing than usual, often with chest tightness   coughing up more sputum   a change in how the sputum looks, such as a change to a darker color or streaks of blood    sputum that has gotten thicker and stickier and harder to cough up   new or worsening wheezing   not being able to do as much activity   being more short of breath during or after physical activity   fever   swelling of your legs   not being able to sleep well because of shortness of breath   chest pain or discomfort that is new or getting worse   feeling drowsy or not able to think clearly.   Any of these symptoms might be a warning sign of a flare-up. If you catch these changes right away and get prompt treatment, you may be able to prevent a trip to the hospital. Ask your healthcare provider for instructions about what to do when you have these symptoms and if there are any other symptoms you should watch fort.   When should I get emergency help?   It is important to know the difference between earlier signs and symptoms of sickness and the signs of an emergency. You should call 911 to get emergency help if:   You cannot talk because you are so short of breath.   It is hard to walk because you are short of breath.   Your lips or fingernails turn gray or blue.   Your heart is beating very fast.   Your medicine does not help for very long or does not help at all.   You are breathing fast and hard.   You feel like you are going to die.   You are having bad chest pain that does not go away within about 5 minutes.   Do not turn up your oxygen unless your healthcare provider tells you to do this.   How can I help prevent COPD?   85% of COPD cases are caused by tobacco smoke. This includes the smokers themselves and people who are exposed to secondhand smoke. In most cases you can prevent COPD by never smoking and not being around others who are smoking.       Published by [employer].  This content is reviewed periodically and is subject to change as new health information becomes available. The information is intended to inform and educate and is not a replacement for medical evaluation, advice, diagnosis or treatment  by a healthcare professional.   Developed by [employer].   ? 2010 [employer] and/or its affiliates. All Rights Reserved.   Copyright   [employer] 2011

## 2017-07-27 NOTE — PROGRESS NOTES
"  SUBJECTIVE:                                                    Jerry Rodriguez is a 82 year old male who presents to clinic today with wife for the following health issues:    HPI    Concern - Coughing  Onset: over a month ago    Description:     Coughing at night, yellow mucous sometimes clear    Intensity: moderate    Progression of Symptoms:  same    Accompanying Signs & Symptoms:  Is fine outside, during the day, but coughs at night and more so in the house    Previous history of similar problem:   none    Precipitating factors:   Worsened by: laying flat    Alleviating factors:  Improved by: nothing    Therapies Tried and outcome: inhalers are not helping    - States both sisters had bronchitis and they took a liquid and it was all better   - No cold symptoms         RN Triage from yesterday  Jerry Rodriguez is a 82 year old male who calls with inhaler concerns  NURSING ASSESSMENT:  Description:  Having trouble using his inhaler: Albuterol. Patient is requesting to use the \"liquid form\" of a medication for bronchitis such as Robitussin - stated unable to take medication with alcohol in them. Coughing after about 2 hours of sleep and then coughs all night.   Onset/duration:  Ongoing use of an albuterol inhaler for his cough  Last exam/Treatment:  07/18/2017  Allergies:        Allergies   Allergen Reactions     No Known Drug Allergies        NURSING PLAN: Nursing advice to patient to be seen in clinic tomorrow     RECOMMENDED DISPOSITION:  To be seen in clinic tomorrow  Will comply with recommendation: Yes        Next 5 appointments (look out 90 days)      Jul 27, 2017 10:15 AM CDT   Office Visit with Marsha Palumbo PA-C   [facility] ([facility]     [address] [place] [id number] [phone]                  If further questions/concerns or if symptoms do not improve, worsen or new symptoms develop, call your PCP or [facility] as soon as possible.        Problem list and histories reviewed & adjusted, as indicated.  Additional history: as documented    ROS:  Constitutional, HEENT, cardiovascular, pulmonary, gi and gu systems are negative, except as otherwise noted.      OBJECTIVE:   /66 (BP Location: Left arm, Patient Position: Chair, Cuff Size: Adult Regular)  Pulse 68  Temp 97.6  F (36.4  C) (Oral)  Resp 16  Wt 194 lb (88 kg)  SpO2 98%  BMI 28.24 kg/m2  Body mass index is 28.24 kg/(m^2).  GENERAL APPEARANCE: healthy, alert and no distress  EYES: Eyes grossly normal to inspection, PERRLA, conjunctivae and sclerae without injection or discharge, EOM intact   RESP: Lungs clear to auscultation - no rales, rhonchi or wheezes but diffuse air trapping   CV: Regular rates and rhythm, normal S1 S2, no S3 or S4, no murmur, click or rub   MS: No musculoskeletal defects are noted and gait is age appropriate without ataxia   SKIN: No suspicious lesions or rashes, hydration status appears adeuqate with normal skin turgor   PSYCH: Alert and oriented x3; speech- coherent , normal rate and volume; able to articulate logical thoughts, able to abstract reason, no tangential thoughts, no hallucinations or delusions, mentation appears normal, Mood is euthymic. Affect is appropriate for this mood state and bright. Thought content is free of suicidal ideation, hallucinations, and delusions. Dress is adequate and upkept. Eye contact is good during conversation.       Diagnostic Test Results:  none     ASSESSMENT/PLAN:       ICD-10-CM    1. Simple chronic bronchitis (H) J41.0 fluticasone-salmeterol (ADVAIR-HFA) 115-21 MCG/ACT Inhaler     COPD ACTION PLAN - order for Health Maintenance     From chart review   - Tried on Flovent, Prednisone, and albuterol   - Also on Flonase and oral allergy   - 6/12/17 - PCP note, add more allergy medication   - New diagnosis of moderate COPD, started on Qvar, not covered, switched to Flovent   - Smoker      - From our encounter and discussion today, appears patient and his wife thought this was more of an acute illness than chronic COPD   - Discussed chronic bronchitis and COPD with them including etiology likely in his case due to smoking, states he has cut back, discussed need for complete cessation, declined interventions   - Reviewed spirometry with patient and wife, including reduced FEV1   - Reviewed use of inhalers, does appear that they are using correctly   - Exam showed no signs of infection and no wheezing   - Recommend due to moderate COPD and no change in symptoms on correct use of ICS that we change to ICS/LABA combo  - Discussed stopping Flovent (blue one) and START Advair   - Continue to rinse out mouth after using Advair to avoid thrush   - Discussed need to keep using spacer and albuterol (pro-air/red one), especially when waking up at night coughing   - Recommend close follow up every 2-4 weeks until patient stable   - May need to consider LABA/LAMA, addition of Singulair, and/or getting him set up with albuterol or duoneb nebulizer   - Reviewed COPD action plan in detail and copy given   - Hand out on COPD given to them to read     The patient indicates understanding of these issues and agrees with the plan.    Follow up: every 2-4 weeks until stable     A total of 40 minutes was spent with the patient today, with greater than 50% of the visit involving counseling and coordination of care.        Marsha Palumbo PA-C  [facility]

## 2017-08-03 NOTE — PROGRESS NOTES
SUBJECTIVE:                                                    Jerry Rodriguez is a 82 year old male who presents to clinic today for the following health issues:      HPI    COPD Follow-Up    Symptoms are currently: stable    Current fatigue or dyspnea with ambulation: none    Shortness of breath: none     Increased or change in Cough/Sputum: No    Fever(s): No    Baseline ambulation without stopping to rest 50 feet. Able to walk up 0 flights of stairs without stopping to rest.    Any ER/UC or hospital admissions since your last visit? No     History   Smoking Status     Current Every Day Smoker     Packs/day: 1.00     Years: 52.00     Types: Cigarettes   Smokeless Tobacco     Current User     Types: Chew     Comment: occasionally/ 1.5tin qwk     No results found for: FEV1, FDY6VPS        Problem list and histories reviewed & adjusted, as indicated.  Additional history: patient is feeling much better with inhalers.    Has occ HA.  Helps to rub the top of the head.  Has sunburn at this time, but         Patient Active Problem List   Diagnosis     Other atopic dermatitis and related conditions     Chronic rhinitis     Generalized osteoarthrosis, unspecified site     Primary localized osteoarthrosis of shoulder region     Stricture and stenosis of esophagus     Anxiety state     HTN (hypertension)     Tobacco use disorder     Hyponatremia     Degenerative arthritis of finger     Hammer toe     Corn     NOEMI positive     Bullous dermatitis     HTN, goal below 140/90     Vertigo     Adenomatous polyp of colon     Lumbosacral neuritis - moderate at L4/5     Degeneration of lumbar intervertebral disc     Simple chronic bronchitis (H)     Past Surgical History:   Procedure Laterality Date     C OSTEOTOMY HIP/FEMUR  1980    traumatic fracture right hip     COLONOSCOPY  10/17/2007    Repeat Colonoscopy in 5 years for surveillance.     ESOPHAGOSCOPY, GASTROSCOPY, DUODENOSCOPY (EGD), COMBINED  1/4/2011    COMBINED  ESOPHAGOSCOPY, GASTROSCOPY, DUODENOSCOPY (EGD), ESOPHAGEAL DILATION BALLOON LESS THAN 30MM performed by ELEN BUSBY at  GI     ESOPHAGOSCOPY, GASTROSCOPY, DUODENOSCOPY (EGD), COMBINED N/A 9/25/2015    Procedure: COMBINED ESOPHAGOSCOPY, GASTROSCOPY, DUODENOSCOPY (EGD), BIOPSY SINGLE OR MULTIPLE;  Surgeon: Markell Lawson MD;  Location:  GI     HC COLONOSCOPY THRU STOMA, DIAGNOSTIC  11/15/2000    Recurrent abdominal pain     HC COLONOSCOPY W/WO BRUSH/WASH  07/19/2004     HC REPAIR INCISIONAL HERNIA,REDUCIBLE  11/10/2000    Hernia Repair, Incisional, Unilateral, Left inguinal hernia and umbilical hernia     HC UGI ENDOSCOPY DIAG W OR W/O BRUSH/WASH  07/19/2004     HC UGI ENDOSCOPY DIAG W OR W/O BRUSH/WASH  05/24/2005    Peptic stricture of esophagus, dilated to 18 mm. Erosive reflux esophagitis. Hiatal hernia. Erosive duodenitis.      UGI ENDOSCOPY, SIMPLE EXAM  02/20/07     HC UGI ENDOSCOPY, SIMPLE EXAM  12/02/08    Schiatzky's ring, dialated, PPI indefinitely      UGI ENDOSCOPY, SIMPLE EXAM  12/15/2009    benign stricture with dialation     LAPAROSCOPIC APPENDECTOMY  09/15/2005       Social History   Substance Use Topics     Smoking status: Current Every Day Smoker     Packs/day: 1.00     Years: 52.00     Types: Cigarettes     Smokeless tobacco: Current User     Types: Chew      Comment: occasionally/ 1.5tin qwk     Alcohol use No      Comment: quit 7-1980     Family History   Problem Relation Age of Onset     CANCER Sister      brain tumor     DIABETES Sister      Alzheimer Disease Sister              ROS:  C: NEGATIVE for fever, chills, change in weight  E/M: NEGATIVE for ear, mouth and throat problems  R: NEGATIVE for significant cough or SOB  CV: NEGATIVE for chest pain, palpitations or peripheral edema    OBJECTIVE:                                                    /58  Pulse 68  Temp 96.5  F (35.8  C) (Temporal)  Resp 20  Wt 193 lb (87.5 kg)  SpO2 97%  BMI 28.09 kg/m2  Body mass index  is 28.09 kg/(m^2).   GENERAL: healthy, alert, well nourished, well hydrated, no distress  HENT: ear canals- normal; TMs- normal; Nose- normal; Mouth- no ulcers, no lesions  NECK: no tenderness, no adenopathy, no asymmetry, no masses, no stiffness; thyroid- normal to palpation  RESP: lungs clear to auscultation - no rales, no rhonchi, no wheezes  CV: regular rates and rhythm, normal S1 S2, no S3 or S4 and no murmur, no click or rub -  NEURO: strength and tone- normal, sensory exam- grossly normal, mentation- intact, speech- normal, reflexes- symmetric         ASSESSMENT/PLAN:                                                        ICD-10-CM    1. Simple chronic bronchitis (H) J41.0 fluticasone-salmeterol (ADVAIR-HFA) 115-21 MCG/ACT Inhaler   2. Tobacco use disorder F17.200    3. Benign essential hypertension I10 **Lipid panel reflex to direct LDL FUTURE 1yr     **Basic metabolic panel FUTURE 1yr   4. Cough R05 albuterol (VENTOLIN HFA) 108 (90 BASE) MCG/ACT Inhaler       Patient is doing well now on inhalers.  Is feeling comfortable with the help of his wife on the use with the chamber and is finding daily use helps.  Is wanting to quit smoking and is thinking patches.  But not sure if he can do it alone.  Advised quit plan and took info to call.  Has done labs today as well and will call if any adjustments needed.    Roberta Beaver MD, MD  [facility]

## 2017-08-10 ENCOUNTER — OFFICE VISIT (OUTPATIENT)
Dept: FAMILY MEDICINE | Facility: OTHER | Age: 82
End: 2017-08-10
Payer: COMMERCIAL

## 2017-08-10 VITALS
OXYGEN SATURATION: 97 % | TEMPERATURE: 96.5 F | WEIGHT: 193 LBS | HEART RATE: 68 BPM | DIASTOLIC BLOOD PRESSURE: 58 MMHG | BODY MASS INDEX: 28.09 KG/M2 | SYSTOLIC BLOOD PRESSURE: 100 MMHG | RESPIRATION RATE: 20 BRPM

## 2017-08-10 DIAGNOSIS — R05.9 COUGH: ICD-10-CM

## 2017-08-10 DIAGNOSIS — J41.0 SIMPLE CHRONIC BRONCHITIS (H): Primary | ICD-10-CM

## 2017-08-10 DIAGNOSIS — F17.200 TOBACCO USE DISORDER: ICD-10-CM

## 2017-08-10 DIAGNOSIS — I10 BENIGN ESSENTIAL HYPERTENSION: ICD-10-CM

## 2017-08-10 LAB
ANION GAP SERPL CALCULATED.3IONS-SCNC: 6 MMOL/L (ref 3–14)
BUN SERPL-MCNC: 5 MG/DL (ref 7–30)
CALCIUM SERPL-MCNC: 8.5 MG/DL (ref 8.5–10.1)
CHLORIDE SERPL-SCNC: 93 MMOL/L (ref 94–109)
CHOLEST SERPL-MCNC: 126 MG/DL
CO2 SERPL-SCNC: 29 MMOL/L (ref 20–32)
CREAT SERPL-MCNC: 0.81 MG/DL (ref 0.66–1.25)
GFR SERPL CREATININE-BSD FRML MDRD: ABNORMAL ML/MIN/1.7M2
GLUCOSE SERPL-MCNC: 92 MG/DL (ref 70–99)
HDLC SERPL-MCNC: 34 MG/DL
LDLC SERPL CALC-MCNC: 74 MG/DL
NONHDLC SERPL-MCNC: 92 MG/DL
POTASSIUM SERPL-SCNC: 4.1 MMOL/L (ref 3.4–5.3)
SODIUM SERPL-SCNC: 128 MMOL/L (ref 133–144)
TRIGL SERPL-MCNC: 89 MG/DL

## 2017-08-10 PROCEDURE — 36415 COLL VENOUS BLD VENIPUNCTURE: CPT | Performed by: FAMILY MEDICINE

## 2017-08-10 PROCEDURE — 80061 LIPID PANEL: CPT | Performed by: FAMILY MEDICINE

## 2017-08-10 PROCEDURE — 80048 BASIC METABOLIC PNL TOTAL CA: CPT | Performed by: FAMILY MEDICINE

## 2017-08-10 PROCEDURE — 99214 OFFICE O/P EST MOD 30 MIN: CPT | Performed by: FAMILY MEDICINE

## 2017-08-10 RX ORDER — FLUTICASONE PROPIONATE AND SALMETEROL XINAFOATE 115; 21 UG/1; UG/1
2 AEROSOL, METERED RESPIRATORY (INHALATION) 2 TIMES DAILY
Qty: 3 INHALER | Refills: 1 | Status: SHIPPED | OUTPATIENT
Start: 2017-08-10 | End: 2018-01-03

## 2017-08-10 RX ORDER — ALBUTEROL SULFATE 90 UG/1
AEROSOL, METERED RESPIRATORY (INHALATION)
Qty: 18 G | Refills: 3 | Status: SHIPPED | OUTPATIENT
Start: 2017-08-10 | End: 2018-09-06

## 2017-08-10 ASSESSMENT — PAIN SCALES - GENERAL: PAINLEVEL: NO PAIN (0)

## 2017-08-10 NOTE — NURSING NOTE
"Chief Complaint   Patient presents with     COPD     Panel Management     mychart, fall risk, lipid, bmp, COPD not on PL       Initial /58  Pulse 68  Temp 96.5  F (35.8  C) (Temporal)  Resp 20  Wt 193 lb (87.5 kg)  SpO2 97%  BMI 28.09 kg/m2 Estimated body mass index is 28.09 kg/(m^2) as calculated from the following:    Height as of 7/25/17: 5' 9.5\" (1.765 m).    Weight as of this encounter: 193 lb (87.5 kg).  Medication Reconciliation: inocencia Delgadillo      "

## 2017-08-10 NOTE — MR AVS SNAPSHOT
"              After Visit Summary   8/10/2017    Jerry Rodriguez    MRN: [id number]           Patient Information     Date Of Birth          1935        Visit Information        Provider Department      8/10/2017 11:00 AM Roberta Beaver MD [facility]        Today's Diagnoses     Simple chronic bronchitis (H)    -  1    Tobacco use disorder        Benign essential hypertension        Cough          Care Instructions    F/u 6 months for COPD          Follow-ups after your visit        Who to contact     If you have questions or need follow up information about today's clinic visit or your schedule please contact [facility] directly at [phone].  Normal or non-critical lab and imaging results will be communicated to you by MyChart, letter or phone within 4 business days after the clinic has received the results. If you do not hear from us within 7 days, please contact the clinic through [employer] or phone. If you have a critical or abnormal lab result, we will notify you by phone as soon as possible.  Submit refill requests through [employer] or call your pharmacy and they will forward the refill request to us. Please allow 3 business days for your refill to be completed.          Additional Information About Your Visit        MyChart Information     [employer]azz lets you send messages to your doctor, view your test results, renew your prescriptions, schedule appointments and more. To sign up, go to [email or web address] . Click on \"Log in\" on the left side of the screen, which will take you to the Welcome page. Then click on \"Sign up Now\" on the right side of the page.     You will be asked to enter the access code listed below, as well as some personal information. Please follow the directions to create your username and password.     Your access code is: [email or web address]  Expires: 2017  7:25 AM     Your access code will  in 90 days. If you need help or a new code, please " call your [place] clinic or [phone].        Care EveryWhere ID     This is your Care EveryWhere ID. This could be used by other organizations to access your [place] medical records  [id number]        Your Vitals Were     Pulse Temperature Respirations Pulse Oximetry BMI (Body Mass Index)       68 96.5  F (35.8  C) (Temporal) 20 97% 28.09 kg/m2        Blood Pressure from Last 3 Encounters:   08/10/17 100/58   07/27/17 104/66   07/18/17 122/60    Weight from Last 3 Encounters:   08/10/17 193 lb (87.5 kg)   07/27/17 194 lb (88 kg)   07/25/17 189 lb (85.7 kg)              We Performed the Following     **Basic metabolic panel FUTURE 1yr     **Lipid panel reflex to direct LDL FUTURE 1yr          Today's Medication Changes          These changes are accurate as of: 8/10/17 11:19 AM.  If you have any questions, ask your nurse or doctor.               These medicines have changed or have updated prescriptions.        Dose/Directions    albuterol 108 (90 BASE) MCG/ACT Inhaler   Commonly known as:  VENTOLIN HFA   This may have changed:  additional instructions   Used for:  Cough   Changed by:  Roberta Beaver MD        INHALE TWO PUFFS BY MOUTH EVERY SIX HOURS AS NEEDED FOR SHORTNESS OF BREATH OR WHEEZING.   Quantity:  18 g   Refills:  3            Where to get your medicines      These medications were sent to [facility] [address] [place] [address] [id number] [place] [address]     Phone:  [phone]     albuterol 108 (90 BASE) MCG/ACT Inhaler    fluticasone-salmeterol 115-21 MCG/ACT Inhaler                Primary Care Provider Office Phone # Fax #    Roberta Beaver [phone]       [address] [facility] [address]        Equal Access to Services     TAYLER LOPEZ AH: Hodan medinao Sojacinto, waaxda luqadaha, qaybta kaalmada adegilsonyada, kenyatta vallecillo. So [facility] [phone].    ATENCIÓN: Si habla español, tiene a billingsley disposición  servicios gratuitos de asistencia lingüística. Dayanara peter [phone].    We comply with applicable federal civil rights laws and [place] laws. We do not discriminate on the basis of race, color, national origin, age, disability sex, sexual orientation or gender identity.            Thank you!     Thank you for choosing [facility]  for your care. Our goal is always to provide you with excellent care. Hearing back from our patients is one way we can continue to improve our services. Please take a few minutes to complete the written survey that you may receive in the mail after your visit with us. Thank you!             Your Updated Medication List - Protect others around you: Learn how to safely use, store and throw away your medicines at www.disposemymeds.org.          This list is accurate as of: 8/10/17 11:19 AM.  Always use your most recent med list.                   Brand Name Dispense Instructions for use Diagnosis    ADVIL PO      Take 400 mg by mouth        albuterol 108 (90 BASE) MCG/ACT Inhaler    VENTOLIN HFA    18 g    INHALE TWO PUFFS BY MOUTH EVERY SIX HOURS AS NEEDED FOR SHORTNESS OF BREATH OR WHEEZING.    Cough       amLODIPine 5 MG tablet    NORVASC    90 tablet    Take 1 tablet (5 mg) by mouth daily    Essential hypertension       aspirin 81 MG tablet      ONE DAILY        betamethasone acet & sod phos 6 (3-3) MG/ML Susp injection    CELESTONE    2 mL    2 mLs (12 mg) by INTRA-ARTICULAR route once for 1 dose    Primary osteoarthritis of both knees       busPIRone 5 MG tablet    BUSPAR    180 tablet    Take 1 tablet (5 mg) by mouth 2 times daily    Anxiety state       cetirizine 10 MG tablet    zyrTEC    30 tablet    Take 1 tablet (10 mg) by mouth every evening    Seasonal allergic rhinitis, unspecified allergic rhinitis trigger       fluticasone-salmeterol 115-21 MCG/ACT Inhaler    ADVAIR-HFA    3 Inhaler    Inhale 2 puffs into the lungs 2 times daily    Simple chronic bronchitis  (H)       meclizine 25 MG tablet    ANTIVERT    30 tablet    Take 1 tablet (25 mg) by mouth 3 times daily as needed for dizziness    Vertigo       Multi-vitamin Tabs tablet      Take 1 tablet by mouth daily        omeprazole 20 MG CR capsule    priLOSEC    90 capsule    Take 1 capsule (20 mg) by mouth daily    Gastroesophageal reflux disease without esophagitis       timolol 0.5 % ophthalmic solution    TIMOPTIC     Apply 1 drop to eye 2 times daily        timolol hemihydrate 0.25 % Soln ophthalmic solution    BETIMOL     Reported on 3/29/2017        triamcinolone 0.1 % cream    KENALOG    80 g    Apply thin layer to affected areas two times a day as needed for itch. Maximum 2 weeks use    Dry skin       VALVED HOLDING CHAMBER [place]     1 each    1 Device 2 times daily To be used with flovent    Simple chronic bronchitis (H)

## 2017-08-21 ENCOUNTER — OFFICE VISIT (OUTPATIENT)
Dept: FAMILY MEDICINE | Facility: OTHER | Age: 82
End: 2017-08-21
Payer: COMMERCIAL

## 2017-08-21 VITALS
DIASTOLIC BLOOD PRESSURE: 64 MMHG | HEART RATE: 77 BPM | WEIGHT: 194 LBS | TEMPERATURE: 97 F | OXYGEN SATURATION: 100 % | BODY MASS INDEX: 28.24 KG/M2 | RESPIRATION RATE: 18 BRPM | SYSTOLIC BLOOD PRESSURE: 110 MMHG

## 2017-08-21 DIAGNOSIS — L55.1 SUNBURN OF SECOND DEGREE: Primary | ICD-10-CM

## 2017-08-21 PROCEDURE — 99213 OFFICE O/P EST LOW 20 MIN: CPT | Performed by: FAMILY MEDICINE

## 2017-08-21 RX ORDER — SILVER SULFADIAZINE 10 MG/G
CREAM TOPICAL 2 TIMES DAILY
Qty: 85 G | Refills: 1 | Status: SHIPPED | OUTPATIENT
Start: 2017-08-21 | End: 2017-08-28

## 2017-08-21 ASSESSMENT — PAIN SCALES - GENERAL: PAINLEVEL: NO PAIN (0)

## 2017-08-21 NOTE — PATIENT INSTRUCTIONS
-Coolbar clothing have UV protection.      Sunburn  A sunburn is an injury to the skin. It is caused by over-exposure to ultraviolet (UV) light from the sun. The skin becomes pink or red and painful. Very severe sunburns may cause blistering and fluid draining from the skin. Open blisters may become infected. Watch for signs of infection. These include worsening pain, redness, swelling, or pus draining from the burn.  Sunburn starts to get better after 1 to 2 days. A few days later the skin begins to peel. It may take up to 3 weeks to fully heal. This depends on how severe the burn is.  Home care  The following guidelines will help you care for your sunburn at home:    Place an ice pack over the injured area. Do this for 20 minutes every 1 to 2 hours the first day for pain relief. To make an ice pack, put ice cubes in a plastic bag that seals at the top. Wrap the bag in a clean, thin towel or cloth. Never put ice or an ice pack directly on your skin. This can cause damage. You can keep using an ice pack at least 3 to 4 times a day until the pain goes away. Cool baths and showers will also help with pain relief.    If you have blisters, don't break them. Open blisters slow the healing process. They also raise your risk of infection. You can cover the open blisters with antibacterial cream or ointment, and a dressing or bandage.    Wash the burned area daily with soap and water. Then gently dry it with a clean towel. If a dressing was used, put a clean one back on until any open blisters dry up. If the bandage sticks, soak it off in warm water. You can also use nonstick dressings to help prevent this from happening.    Drink plenty of fluids to avoid fluid loss (dehydration).  Medicine    You can take over-the-counter medicine for pain, unless you were given a different pain medicine to use. Talk with your provider before using these medicines if you have chronic liver or kidney disease, ever had a stomach ulcer or GI  bleeding, or are taking blood thinner medicines. Don't give ibuprofen to children younger than 6 months.    Over-the-counter first-aid creams and sprays made with lidocaine or benzocaine can also help with pain. However, some people are sensitive to medicines that have these ingredients. If the redness or itching gets worse, stop using these medicines. You can use aloe or a moisturizing cream with aloe, or hydrocortisone cream (sold over the counter) to help with pain and swelling. Use these only over spots that don t have broken blisters.    Antibiotics are usually not given unless there is an infection. If you were prescribed antibiotics, take them until they are finished. It is important to finish the antibiotics even if the burn looks better. This will ensure the infection has cleared.  Prevention  Sun exposure damages the DNA of skin cells. This can lead to premature skin aging and wrinkles. Sun exposure is the main cause of skin cancer. Protect your skin from the sun by following these tips:    Limit your exposure to UV light. The sun is strongest from 10 a.m. to 4 p.m. If possible, arrange your sun exposure to be before or after those hours. The sun is more intense at the beach, where light reflects off the sand and water. The sun is also more intense at higher altitudes, especially where there is reflecting snow. You can even get sunburn on a cloudy day, because UV light passes through clouds.    Do not use tanning beds.    Wear protective clothing and a hat. Clothing is more effective than sunscreen alone in blocking UV light.    Stay in the shade or carry an umbrella.    Use sunscreen on your skin. Put sunscreen on 15 to 20 minutes before going out in the sun. This gives the sunscreen time to interact with your skin. Reapply sunscreen every 1 to 2 hours. Reapply it sooner if it is washed away by sweat or water. Use a sunscreen rated at SPF 30 or higher.    Wear sunglasses to protect your eyes from UV  exposure.    Many medicines can increase your sensitivity to the sun. These include heart, nausea, anti-inflammatory, and diabetic medicines. It also includes antibiotics and diuretics. Check medicine fact sheets. Talk with your provider if you have questions about your increased risk of sun sensitivity. Sunscreens may not prevent this.   Follow-up care  Sunburn usually heals without any problems. Follow up with your provider if your sunburn is not healing with home treatments. Also see your provider if you have signs and symptoms of infection.  When to seek medical advice  Call your healthcare provider right away if any of these occur:    Pain that gets worse    Increasing redness, or red streaks leading away from an open blister    Swelling or pus coming from open blisters    Upset stomach (nausea)    Fever of 100.4  F (38.0  C) or higher, or as directed  Call 911  Call 911 if you have dizziness, fainting, or weakness.  Date Last Reviewed: 8/1/2016 2000-2017 [employer]. [address] [place] [address], [place] [address]. All rights reserved. This information is not intended as a substitute for professional medical care. Always follow your healthcare professional's instructions.

## 2017-08-21 NOTE — NURSING NOTE
"Chief Complaint   Patient presents with     Derm Problem       Initial /64  Pulse 77  Temp 97  F (36.1  C) (Temporal)  Resp 18  Wt 194 lb (88 kg)  SpO2 100%  BMI 28.24 kg/m2 Estimated body mass index is 28.24 kg/(m^2) as calculated from the following:    Height as of 7/25/17: 5' 9.5\" (1.765 m).    Weight as of this encounter: 194 lb (88 kg).  Medication Reconciliation: complete   Talisha Delgadillo MA      "

## 2017-08-21 NOTE — PROGRESS NOTES
SUBJECTIVE:                                                    Jerry Rodriguez is a 82 year old male who presents to clinic today for the following health issues:      HPI    Pt is present in clinic for sunburn, PT has been using Suncreen. Pt has had the sunburn for over a week. Pt states it burns and is painful.    SKIN: The patient reports sunburn on his around his neck area that has been painful for a week.    Problem list and histories reviewed & adjusted, as indicated.  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     ESOPHAGOSCOPY, GASTROSCOPY, DUODENOSCOPY (EGD), COMBINED  1/4/2011    COMBINED ESOPHAGOSCOPY, GASTROSCOPY, DUODENOSCOPY (EGD), ESOPHAGEAL DILATION BALLOON LESS THAN 30MM performed by ELEN BUSBY at  GI     ESOPHAGOSCOPY, GASTROSCOPY, DUODENOSCOPY (EGD), COMBINED N/A 9/25/2015    Procedure: COMBINED ESOPHAGOSCOPY, GASTROSCOPY, DUODENOSCOPY (EGD), BIOPSY SINGLE OR MULTIPLE;  Surgeon: Markell Lawson MD;  Location:  GI     HC COLONOSCOPY THRU STOMA, DIAGNOSTIC  11/15/2000    Recurrent abdominal pain     HC COLONOSCOPY W/WO BRUSH/WASH  07/19/2004     HC REPAIR INCISIONAL  HERNIA,REDUCIBLE  11/10/2000    Hernia Repair, Incisional, Unilateral, Left inguinal hernia and umbilical hernia     HC UGI ENDOSCOPY DIAG W OR W/O BRUSH/WASH  07/19/2004     HC UGI ENDOSCOPY DIAG W OR W/O BRUSH/WASH  05/24/2005    Peptic stricture of esophagus, dilated to 18 mm. Erosive reflux esophagitis. Hiatal hernia. Erosive duodenitis.     HC UGI ENDOSCOPY, SIMPLE EXAM  02/20/07     HC UGI ENDOSCOPY, SIMPLE EXAM  12/02/08    Schiatzky's ring, dialated, PPI indefinitely     HC UGI ENDOSCOPY, SIMPLE EXAM  12/15/2009    benign stricture with dialation     LAPAROSCOPIC APPENDECTOMY  09/15/2005       Social History   Substance Use Topics     Smoking status: Current Every Day Smoker     Packs/day: 1.00     Years: 52.00     Types: Cigarettes     Smokeless tobacco: Current User     Types: Chew      Comment: occasionally/ 1.5tin qwk     Alcohol use No      Comment: quit 7-1980     Family History   Problem Relation Age of Onset     CANCER Sister      brain tumor     DIABETES Sister      Alzheimer Disease Sister              ROS:  Constitutional, HEENT, cardiovascular, pulmonary, GI, , musculoskeletal, neuro, skin, endocrine and psych systems are negative, except as in HPI or otherwise noted     This document serves as a record of the services and decisions personally performed and made by Roberta Beaver MD. It was created on her behalf by Ihsan Canas , a trained medical scribe. The creation of this document is based the provider's statements to the medical scribe.  Ihsan Canas, August 21, 2017 3:09 PM     OBJECTIVE:                                                    /64  Pulse 77  Temp 97  F (36.1  C) (Temporal)  Resp 18  Wt 88 kg (194 lb)  SpO2 100%  BMI 28.24 kg/m2  Body mass index is 28.24 kg/(m^2).   GENERAL: healthy, alert, well nourished, well hydrated, no distress  SKIN: 2nd degree burns with blistering at anterior neck area. Posterior neck area had 1st degree with redness and peeling.  PSYCH:  Alert and oriented times 3; speech- coherent , normal rate and volume; able to articulate logical thoughts, able to abstract reason, no tangential thoughts, no hallucinations or delusions, affect- normal    No results found for this or any previous visit (from the past 24 hour(s)).     ASSESSMENT/PLAN:                                                        ICD-10-CM    1. Sunburn of second degree L55.1 silver sulfADIAZINE (SILVADENE) 1 % cream       -Sunscreen use discussed. Sunburn information given to patient.   -Silver sulfadiazine prescribed.    See pt instructions.     The information in this document, created by the medical scribe for me, accurately reflects the services I personally performed and the decisions made by me. I have reviewed and approved this document for accuracy.   MD Roberta Burciaga MD, MD  [facility]

## 2017-08-21 NOTE — MR AVS SNAPSHOT
After Visit Summary   8/21/2017    Jerry Rodriguez    MRN: [id number]           Patient Information     Date Of Birth          1935        Visit Information        Provider Department      8/21/2017 3:15 PM Roberta Beaver MD [facility]        Today's Diagnoses     Sunburn of second degree    -  1      Care Instructions    -Coolbar clothing have UV protection.      Sunburn  A sunburn is an injury to the skin. It is caused by over-exposure to ultraviolet (UV) light from the sun. The skin becomes pink or red and painful. Very severe sunburns may cause blistering and fluid draining from the skin. Open blisters may become infected. Watch for signs of infection. These include worsening pain, redness, swelling, or pus draining from the burn.  Sunburn starts to get better after 1 to 2 days. A few days later the skin begins to peel. It may take up to 3 weeks to fully heal. This depends on how severe the burn is.  Home care  The following guidelines will help you care for your sunburn at home:    Place an ice pack over the injured area. Do this for 20 minutes every 1 to 2 hours the first day for pain relief. To make an ice pack, put ice cubes in a plastic bag that seals at the top. Wrap the bag in a clean, thin towel or cloth. Never put ice or an ice pack directly on your skin. This can cause damage. You can keep using an ice pack at least 3 to 4 times a day until the pain goes away. Cool baths and showers will also help with pain relief.    If you have blisters, don't break them. Open blisters slow the healing process. They also raise your risk of infection. You can cover the open blisters with antibacterial cream or ointment, and a dressing or bandage.    Wash the burned area daily with soap and water. Then gently dry it with a clean towel. If a dressing was used, put a clean one back on until any open blisters dry up. If the bandage sticks, soak it off in warm water. You can also use  nonstick dressings to help prevent this from happening.    Drink plenty of fluids to avoid fluid loss (dehydration).  Medicine    You can take over-the-counter medicine for pain, unless you were given a different pain medicine to use. Talk with your provider before using these medicines if you have chronic liver or kidney disease, ever had a stomach ulcer or GI bleeding, or are taking blood thinner medicines. Don't give ibuprofen to children younger than 6 months.    Over-the-counter first-aid creams and sprays made with lidocaine or benzocaine can also help with pain. However, some people are sensitive to medicines that have these ingredients. If the redness or itching gets worse, stop using these medicines. You can use aloe or a moisturizing cream with aloe, or hydrocortisone cream (sold over the counter) to help with pain and swelling. Use these only over spots that don t have broken blisters.    Antibiotics are usually not given unless there is an infection. If you were prescribed antibiotics, take them until they are finished. It is important to finish the antibiotics even if the burn looks better. This will ensure the infection has cleared.  Prevention  Sun exposure damages the DNA of skin cells. This can lead to premature skin aging and wrinkles. Sun exposure is the main cause of skin cancer. Protect your skin from the sun by following these tips:    Limit your exposure to UV light. The sun is strongest from 10 a.m. to 4 p.m. If possible, arrange your sun exposure to be before or after those hours. The sun is more intense at the beach, where light reflects off the sand and water. The sun is also more intense at higher altitudes, especially where there is reflecting snow. You can even get sunburn on a cloudy day, because UV light passes through clouds.    Do not use tanning beds.    Wear protective clothing and a hat. Clothing is more effective than sunscreen alone in blocking UV light.    Stay in the shade or  carry an umbrella.    Use sunscreen on your skin. Put sunscreen on 15 to 20 minutes before going out in the sun. This gives the sunscreen time to interact with your skin. Reapply sunscreen every 1 to 2 hours. Reapply it sooner if it is washed away by sweat or water. Use a sunscreen rated at SPF 30 or higher.    Wear sunglasses to protect your eyes from UV exposure.    Many medicines can increase your sensitivity to the sun. These include heart, nausea, anti-inflammatory, and diabetic medicines. It also includes antibiotics and diuretics. Check medicine fact sheets. Talk with your provider if you have questions about your increased risk of sun sensitivity. Sunscreens may not prevent this.   Follow-up care  Sunburn usually heals without any problems. Follow up with your provider if your sunburn is not healing with home treatments. Also see your provider if you have signs and symptoms of infection.  When to seek medical advice  Call your healthcare provider right away if any of these occur:    Pain that gets worse    Increasing redness, or red streaks leading away from an open blister    Swelling or pus coming from open blisters    Upset stomach (nausea)    Fever of 100.4  F (38.0  C) or higher, or as directed  Call 911  Call 911 if you have dizziness, fainting, or weakness.  Date Last Reviewed: 8/1/2016 2000-2017 [employer]. [address] [place] [address], [place] [address]. All rights reserved. This information is not intended as a substitute for professional medical care. Always follow your healthcare professional's instructions.                Follow-ups after your visit        Who to contact     If you have questions or need follow up information about today's clinic visit or your schedule please contact [facility] directly at [phone].  Normal or non-critical lab and imaging results will be communicated to you by MyChart, letter or phone within 4 business days after the clinic has  "received the results. If you do not hear from us within 7 days, please contact the clinic through [employer] or phone. If you have a critical or abnormal lab result, we will notify you by phone as soon as possible.  Submit refill requests through [employer] or call your pharmacy and they will forward the refill request to us. Please allow 3 business days for your refill to be completed.          Additional Information About Your Visit        [employer] Information     [employer] lets you send messages to your doctor, view your test results, renew your prescriptions, schedule appointments and more. To sign up, go to [email or web address] . Click on \"Log in\" on the left side of the screen, which will take you to the Welcome page. Then click on \"Sign up Now\" on the right side of the page.     You will be asked to enter the access code listed below, as well as some personal information. Please follow the directions to create your username and password.     Your access code is: [email or web address]  Expires: 2017  7:25 AM     Your access code will  in 90 days. If you need help or a new code, please call your [place] clinic or [phone].        Care EveryWhere ID     This is your Care EveryWhere ID. This could be used by other organizations to access your [place] medical records  [id number]        Your Vitals Were     Pulse Temperature Respirations Pulse Oximetry BMI (Body Mass Index)       77 97  F (36.1  C) (Temporal) 18 100% 28.24 kg/m2        Blood Pressure from Last 3 Encounters:   17 110/64   08/10/17 100/58   17 104/66    Weight from Last 3 Encounters:   17 194 lb (88 kg)   08/10/17 193 lb (87.5 kg)   17 194 lb (88 kg)              Today, you had the following     No orders found for display         Today's Medication Changes          These changes are accurate as of: 17  3:16 PM.  If you have any questions, ask your nurse or doctor.               Start taking these medicines.        " Dose/Directions    silver sulfADIAZINE 1 % cream   Commonly known as:  SILVADENE   Used for:  Sunburn of second degree   Started by:  Roberta Beaver MD        Apply topically 2 times daily for 7 days   Quantity:  85 g   Refills:  1            Where to get your medicines      These medications were sent to [facility] [address] [address] [address] [facility]  [address] [facility] [address] [address]     Phone:  [phone]     silver sulfADIAZINE 1 % cream                Primary Care Provider Office Phone # Fax #    Roberta Beaver [phone]       [address] [facility] [address]        Equal Access to Services     [facility]: Hadii tashia samson hadasho Soomaali, waaxda luqadaha, qaybta kaalmada adeegyada, kenyatta nobles . So [facility] [phone].    ATENCIÓN: Si habla español, tiene a billingsley disposición servicios gratuitos de asistencia lingüística. [facility] [phone].    We comply with applicable federal civil rights laws and [place] laws. We do not discriminate on the basis of race, color, national origin, age, disability sex, sexual orientation or gender identity.            Thank you!     Thank you for choosing [facility]  for your care. Our goal is always to provide you with excellent care. Hearing back from our patients is one way we can continue to improve our services. Please take a few minutes to complete the written survey that you may receive in the mail after your visit with us. Thank you!             Your Updated Medication List - Protect others around you: Learn how to safely use, store and throw away your medicines at www.disposemymeds.org.          This list is accurate as of: 8/21/17  3:16 PM.  Always use your most recent med list.                   Brand Name Dispense Instructions for use Diagnosis    ADVIL PO      Take 400 mg by mouth        albuterol 108 (90 BASE) MCG/ACT Inhaler    VENTOLIN HFA    18 g    INHALE TWO PUFFS BY  MOUTH EVERY SIX HOURS AS NEEDED FOR SHORTNESS OF BREATH OR WHEEZING.    Cough       amLODIPine 5 MG tablet    NORVASC    90 tablet    Take 1 tablet (5 mg) by mouth daily    Essential hypertension       aspirin 81 MG tablet      ONE DAILY        betamethasone acet & sod phos 6 (3-3) MG/ML Susp injection    CELESTONE    2 mL    2 mLs (12 mg) by INTRA-ARTICULAR route once for 1 dose    Primary osteoarthritis of both knees       busPIRone 5 MG tablet    BUSPAR    180 tablet    Take 1 tablet (5 mg) by mouth 2 times daily    Anxiety state       cetirizine 10 MG tablet    zyrTEC    30 tablet    Take 1 tablet (10 mg) by mouth every evening    Seasonal allergic rhinitis, unspecified allergic rhinitis trigger       fluticasone-salmeterol 115-21 MCG/ACT Inhaler    ADVAIR-HFA    3 Inhaler    Inhale 2 puffs into the lungs 2 times daily    Simple chronic bronchitis (H)       meclizine 25 MG tablet    ANTIVERT    30 tablet    Take 1 tablet (25 mg) by mouth 3 times daily as needed for dizziness    Vertigo       Multi-vitamin Tabs tablet      Take 1 tablet by mouth daily        omeprazole 20 MG CR capsule    priLOSEC    90 capsule    Take 1 capsule (20 mg) by mouth daily    Gastroesophageal reflux disease without esophagitis       silver sulfADIAZINE 1 % cream    SILVADENE    85 g    Apply topically 2 times daily for 7 days    Sunburn of second degree       timolol 0.5 % ophthalmic solution    TIMOPTIC     Apply 1 drop to eye 2 times daily        timolol hemihydrate 0.25 % Soln ophthalmic solution    BETIMOL     Reported on 3/29/2017        triamcinolone 0.1 % cream    KENALOG    80 g    Apply thin layer to affected areas two times a day as needed for itch. Maximum 2 weeks use    Dry skin       VALVED HOLDING CHAMBER Sydney     1 each    1 Device 2 times daily To be used with flovent    Simple chronic bronchitis (H)

## 2017-09-07 ENCOUNTER — HOSPITAL ENCOUNTER (EMERGENCY)
Facility: CLINIC | Age: 82
Discharge: HOME | End: 2017-09-07
Attending: EMERGENCY MEDICINE | Admitting: EMERGENCY MEDICINE
Payer: MEDICARE

## 2017-09-07 VITALS
TEMPERATURE: 97 F | RESPIRATION RATE: 16 BRPM | BODY MASS INDEX: 28.97 KG/M2 | OXYGEN SATURATION: 97 % | WEIGHT: 199 LBS | SYSTOLIC BLOOD PRESSURE: 146 MMHG | DIASTOLIC BLOOD PRESSURE: 96 MMHG

## 2017-09-07 DIAGNOSIS — R21 SKIN MACULE OR MACULAR RASH: ICD-10-CM

## 2017-09-07 LAB
ALBUMIN SERPL-MCNC: 3.3 G/DL (ref 3.4–5)
ALP SERPL-CCNC: 79 U/L (ref 40–150)
ALT SERPL W P-5'-P-CCNC: 21 U/L (ref 0–70)
ANION GAP SERPL CALCULATED.3IONS-SCNC: 6 MMOL/L (ref 3–14)
AST SERPL W P-5'-P-CCNC: 15 U/L (ref 0–45)
BASOPHILS # BLD AUTO: 0 10E9/L (ref 0–0.2)
BASOPHILS NFR BLD AUTO: 0.4 %
BILIRUB SERPL-MCNC: 0.6 MG/DL (ref 0.2–1.3)
BUN SERPL-MCNC: 5 MG/DL (ref 7–30)
CALCIUM SERPL-MCNC: 8.5 MG/DL (ref 8.5–10.1)
CHLORIDE SERPL-SCNC: 96 MMOL/L (ref 94–109)
CO2 SERPL-SCNC: 29 MMOL/L (ref 20–32)
CREAT SERPL-MCNC: 0.97 MG/DL (ref 0.66–1.25)
CRP SERPL-MCNC: 7.5 MG/L (ref 0–8)
DIFFERENTIAL METHOD BLD: NORMAL
EOSINOPHIL # BLD AUTO: 0.2 10E9/L (ref 0–0.7)
EOSINOPHIL NFR BLD AUTO: 2.5 %
ERYTHROCYTE [DISTWIDTH] IN BLOOD BY AUTOMATED COUNT: 13.4 % (ref 10–15)
GFR SERPL CREATININE-BSD FRML MDRD: 74 ML/MIN/1.7M2
GLUCOSE SERPL-MCNC: 79 MG/DL (ref 70–99)
HCT VFR BLD AUTO: 43.4 % (ref 40–53)
HGB BLD-MCNC: 14.6 G/DL (ref 13.3–17.7)
IMM GRANULOCYTES # BLD: 0 10E9/L (ref 0–0.4)
IMM GRANULOCYTES NFR BLD: 0.5 %
LYMPHOCYTES # BLD AUTO: 1.7 10E9/L (ref 0.8–5.3)
LYMPHOCYTES NFR BLD AUTO: 20.9 %
MCH RBC QN AUTO: 30 PG (ref 26.5–33)
MCHC RBC AUTO-ENTMCNC: 33.6 G/DL (ref 31.5–36.5)
MCV RBC AUTO: 89 FL (ref 78–100)
MONOCYTES # BLD AUTO: 0.9 10E9/L (ref 0–1.3)
MONOCYTES NFR BLD AUTO: 10.6 %
NEUTROPHILS # BLD AUTO: 5.4 10E9/L (ref 1.6–8.3)
NEUTROPHILS NFR BLD AUTO: 65.1 %
PLATELET # BLD AUTO: 248 10E9/L (ref 150–450)
POTASSIUM SERPL-SCNC: 3.9 MMOL/L (ref 3.4–5.3)
PROT SERPL-MCNC: 7.9 G/DL (ref 6.8–8.8)
RBC # BLD AUTO: 4.87 10E12/L (ref 4.4–5.9)
SODIUM SERPL-SCNC: 131 MMOL/L (ref 133–144)
WBC # BLD AUTO: 8.3 10E9/L (ref 4–11)

## 2017-09-07 PROCEDURE — 86140 C-REACTIVE PROTEIN: CPT | Performed by: EMERGENCY MEDICINE

## 2017-09-07 PROCEDURE — 85025 COMPLETE CBC W/AUTO DIFF WBC: CPT | Performed by: EMERGENCY MEDICINE

## 2017-09-07 PROCEDURE — 96374 THER/PROPH/DIAG INJ IV PUSH: CPT | Performed by: EMERGENCY MEDICINE

## 2017-09-07 PROCEDURE — 96375 TX/PRO/DX INJ NEW DRUG ADDON: CPT | Performed by: EMERGENCY MEDICINE

## 2017-09-07 PROCEDURE — S0028 INJECTION, FAMOTIDINE, 20 MG: HCPCS | Performed by: EMERGENCY MEDICINE

## 2017-09-07 PROCEDURE — 25000128 H RX IP 250 OP 636: Performed by: EMERGENCY MEDICINE

## 2017-09-07 PROCEDURE — 80053 COMPREHEN METABOLIC PANEL: CPT | Performed by: EMERGENCY MEDICINE

## 2017-09-07 PROCEDURE — 99284 EMERGENCY DEPT VISIT MOD MDM: CPT | Mod: 25 | Performed by: EMERGENCY MEDICINE

## 2017-09-07 PROCEDURE — 25000125 ZZHC RX 250: Performed by: EMERGENCY MEDICINE

## 2017-09-07 PROCEDURE — 99284 EMERGENCY DEPT VISIT MOD MDM: CPT | Mod: Z6 | Performed by: EMERGENCY MEDICINE

## 2017-09-07 RX ORDER — DIPHENHYDRAMINE HYDROCHLORIDE 50 MG/ML
50 INJECTION INTRAMUSCULAR; INTRAVENOUS ONCE
Status: COMPLETED | OUTPATIENT
Start: 2017-09-07 | End: 2017-09-07

## 2017-09-07 RX ORDER — LIDOCAINE 40 MG/G
CREAM TOPICAL
Status: DISCONTINUED | OUTPATIENT
Start: 2017-09-07 | End: 2017-09-07 | Stop reason: HOSPADM

## 2017-09-07 RX ORDER — SILVER SULFADIAZINE 10 MG/G
CREAM TOPICAL DAILY
COMMUNITY
End: 2018-01-17

## 2017-09-07 RX ORDER — METHYLPREDNISOLONE SODIUM SUCCINATE 125 MG/2ML
125 INJECTION, POWDER, LYOPHILIZED, FOR SOLUTION INTRAMUSCULAR; INTRAVENOUS ONCE
Status: COMPLETED | OUTPATIENT
Start: 2017-09-07 | End: 2017-09-07

## 2017-09-07 RX ORDER — PREDNISONE 10 MG/1
TABLET ORAL
Qty: 32 TABLET | Refills: 0 | Status: SHIPPED | OUTPATIENT
Start: 2017-09-07 | End: 2017-09-17

## 2017-09-07 RX ADMIN — DIPHENHYDRAMINE HYDROCHLORIDE 50 MG: 50 INJECTION, SOLUTION INTRAMUSCULAR; INTRAVENOUS at 12:25

## 2017-09-07 RX ADMIN — FAMOTIDINE 20 MG: 10 INJECTION, SOLUTION INTRAVENOUS at 12:24

## 2017-09-07 RX ADMIN — METHYLPREDNISOLONE SODIUM SUCCINATE 125 MG: 125 INJECTION, POWDER, FOR SOLUTION INTRAMUSCULAR; INTRAVENOUS at 12:26

## 2017-09-07 ASSESSMENT — ENCOUNTER SYMPTOMS
CHILLS: 1
FEVER: 0

## 2017-09-07 NOTE — ED AVS SNAPSHOT
[facility] Emergency Department    [address] [place] [address]    Phone:  [phone]    Fax:  [phone]                                       Jerry Rodriguez   MRN: [id number]    Department:  [facility] Emergency Department   Date of Visit:  9/7/2017           After Visit Summary Signature Page     I have received my discharge instructions, and my questions have been answered. I have discussed any challenges I see with this plan with the nurse or doctor.    ..........................................................................................................................................  Patient/Patient Representative Signature      ..........................................................................................................................................  Patient Representative Print Name and Relationship to Patient    ..................................................               ................................................  Date                                            Time    ..........................................................................................................................................  Reviewed by Signature/Title    ...................................................              ..............................................  Date                                                            Time

## 2017-09-07 NOTE — ED AVS SNAPSHOT
[facility] Emergency Department    [address]     [place] [address]    Phone:  [phone]    Fax:  [phone]                                       Jerry Rodriguez   MRN: [id number]    Department:  [facility] Emergency Department   Date of Visit:  9/7/2017           Patient Information     Date Of Birth          1935        Your diagnoses for this visit were:     None       You were seen by Tim Jane MD.      Follow-up Information     Follow up with Anton Ma MD.    Specialty:  Dermatology    Why:  If symptoms persist after completion of steroids    Contact information:    [address] [facility] [address]  [phone]        Discharge References/Attachments     DERMATITIS, CONTACT, UNDERSTANDING (ENGLISH)      24 Hour Appointment Hotline       To make an appointment at any [place] clinic, call [phone] ([phone]). If you don't have a family doctor or clinic, we will help you find one. [place] clinics are conveniently located to serve the needs of you and your family.             Review of your medicines      START taking        Dose / Directions Last dose taken    predniSONE 10 MG tablet   Commonly known as:  DELTASONE   Quantity:  32 tablet        Take 4 tablets daily for 5 days,  take 2 tablets daily for 3 days, take 1 tablet daily for 3 days, take half a tablet for 3 days.   Refills:  0          Our records show that you are taking the medicines listed below. If these are incorrect, please call your family doctor or clinic.        Dose / Directions Last dose taken    ADVIL PO   Dose:  400 mg        Take 400 mg by mouth   Refills:  0        albuterol 108 (90 BASE) MCG/ACT Inhaler   Commonly known as:  VENTOLIN HFA   Quantity:  18 g        INHALE TWO PUFFS BY MOUTH EVERY SIX HOURS AS NEEDED FOR SHORTNESS OF BREATH OR WHEEZING.   Refills:  3        amLODIPine 5 MG tablet   Commonly known as:  NORVASC   Dose:  5 mg   Quantity:  90 tablet         Take 1 tablet (5 mg) by mouth daily   Refills:  3        aspirin 81 MG tablet        ONE DAILY   Refills:  0        betamethasone acet & sod phos 6 (3-3) MG/ML Susp injection   Commonly known as:  CELESTONE   Dose:  12 mg   Quantity:  2 mL        2 mLs (12 mg) by INTRA-ARTICULAR route once for 1 dose   Refills:  0        busPIRone 5 MG tablet   Commonly known as:  BUSPAR   Dose:  5 mg   Quantity:  180 tablet        Take 1 tablet (5 mg) by mouth 2 times daily   Refills:  1        cetirizine 10 MG tablet   Commonly known as:  zyrTEC   Dose:  10 mg   Quantity:  30 tablet        Take 1 tablet (10 mg) by mouth every evening   Refills:  9        fluticasone-salmeterol 115-21 MCG/ACT Inhaler   Commonly known as:  ADVAIR-HFA   Dose:  2 puff   Quantity:  3 Inhaler        Inhale 2 puffs into the lungs 2 times daily   Refills:  1        meclizine 25 MG tablet   Commonly known as:  ANTIVERT   Dose:  25 mg   Quantity:  30 tablet        Take 1 tablet (25 mg) by mouth 3 times daily as needed for dizziness   Refills:  12        Multi-vitamin Tabs tablet   Dose:  1 tablet        Take 1 tablet by mouth daily   Refills:  0        omeprazole 20 MG CR capsule   Commonly known as:  priLOSEC   Dose:  20 mg   Quantity:  90 capsule        Take 1 capsule (20 mg) by mouth daily   Refills:  3        silver sulfADIAZINE 1 % cream   Commonly known as:  SILVADENE        Apply topically daily   Refills:  0        timolol 0.5 % ophthalmic solution   Commonly known as:  TIMOPTIC   Dose:  1 drop        Apply 1 drop to eye 2 times daily   Refills:  0        timolol hemihydrate 0.25 % Soln ophthalmic solution   Commonly known as:  BETIMOL        Reported on 3/29/2017   Refills:  0        triamcinolone 0.1 % cream   Commonly known as:  KENALOG   Quantity:  80 g        Apply thin layer to affected areas two times a day as needed for itch. Maximum 2 weeks use   Refills:  0        VALVED HOLDING CHAMBER Sydney   Dose:  1 Device   Quantity:  1 each         "1 Device 2 times daily To be used with flovent   Refills:  1                Prescriptions were sent or printed at these locations (1 Prescription)                   [facility] [address] [facility] [address] [facility]   [id number] [place] [address]    Telephone:  [phone]   Fax:  [phone]   Hours:                  E-Prescribed (1 of 1)         predniSONE (DELTASONE) 10 MG tablet                Procedures and tests performed during your visit     CBC with platelets differential    CRP inflammation    Comprehensive metabolic panel      Orders Needing Specimen Collection     None      Pending Results     No orders found from 9/5/2017 to 9/8/2017.            Pending Culture Results     No orders found from 9/5/2017 to 9/8/2017.            Pending Results Instructions     If you had any lab results that were not finalized at the time of your Discharge, you can call the ED Lab Result RN at [phone]. You will be contacted by this team for any positive Lab results or changes in treatment. The nurses are available 7 days a week from 10A to 6:30P.  You can leave a message 24 hours per day and they will return your call.        Thank you for choosing [facility]       Thank you for choosing [facility] for your care. Our goal is always to provide you with excellent care. Hearing back from our patients is one way we can continue to improve our services. Please take a few minutes to complete the written survey that you may receive in the mail after you visit with us. Thank you!        [employer] Information     [employer] lets you send messages to your doctor, view your test results, renew your prescriptions, schedule appointments and more. To sign up, go to [email or web address] . Click on \"Log in\" on the left side of the screen, which will take you to the Welcome page. Then click on \"Sign up Now\" on the right side of the page.     You will be asked to enter the access code listed below, as well as some " personal information. Please follow the directions to create your username and password.     Your access code is: [email or web address]  Expires: 2017  7:25 AM     Your access code will  in 90 days. If you need help or a new code, please call your [place] clinic or [phone].        Care EveryWhere ID     This is your Care EveryWhere ID. This could be used by other organizations to access your [place] medical records  [id number]        Equal Access to Services     [facility] : Hadsesar medinao Sojacinto, waaxda luqadaha, qaybta kaalmada adekeesha, kenyatta nobles . So [facility] [phone].    ATENCIÓN: Si habla español, tiene a billingsley disposición servicios gratuitos de asistencia lingüística. Llame al [phone].    We comply with applicable federal civil rights laws and [place] laws. We do not discriminate on the basis of race, color, national origin, age, disability sex, sexual orientation or gender identity.            After Visit Summary       This is your record. Keep this with you and show to your community pharmacist(s) and doctor(s) at your next visit.

## 2017-09-07 NOTE — ED NOTES
"Here with a rash to back and chest . It appears to look like a sun burn. He has been applying the Silver Sulfadiazine cream twice daily with no relief. Denies pain but states \"it itches all the was down to my bone\"  "

## 2017-09-07 NOTE — ED PROVIDER NOTES
"  History     Chief Complaint   Patient presents with     Rash     The history is provided by the patient and the spouse.     Jerry Rodriguez is a 82 year old male with a history of anxiety, hypertension, and hyponatremia who presents to the ED complaining of a rash. Patient's rash is located to his back and chest, it looks like a sub burn. He has been dealing with this rash for the past 2 weeks. Patient denies pain but states \"it itches all the way down to my bone\" and it \"feels like he has something crawling around his skin. Patient denies fever and chills but his wife reports Jerry has had chills. Patient has been applying 1% silver sulfadiazine cream with no relief. Patient denies rash to legs. His skin has been flaking. He denies use of new medications or antibiotics. He uses an inhaler, Buspar, and amlodipine. Jerry has vitiligo to his face, it isn't new.  He's had no recent illness or exposure to infectious illness.  No recent travel.  No history of photosensitivity.  Does have vitiligo on his face, scalp, and arms.    I have reviewed the Medications, Allergies, Past Medical and Surgical History, and Social History in the Epic system.    Allergies:   Allergies   Allergen Reactions     No Known Drug Allergies          No current facility-administered medications on file prior to encounter.   Current Outpatient Prescriptions on File Prior to Encounter:  albuterol (VENTOLIN HFA) 108 (90 BASE) MCG/ACT Inhaler INHALE TWO PUFFS BY MOUTH EVERY SIX HOURS AS NEEDED FOR SHORTNESS OF BREATH OR WHEEZING.   Spacer/Aero-Holding Chambers (VALVED HOLDING CHAMBER) JULISSA 1 Device 2 times daily To be used with flovent   amLODIPine (NORVASC) 5 MG tablet Take 1 tablet (5 mg) by mouth daily   busPIRone (BUSPAR) 5 MG tablet Take 1 tablet (5 mg) by mouth 2 times daily   timolol (TIMOPTIC) 0.5 % ophthalmic solution Apply 1 drop to eye 2 times daily   Ibuprofen (ADVIL PO) Take 400 mg by mouth   triamcinolone (KENALOG) 0.1 % cream " Apply thin layer to affected areas two times a day as needed for itch. Maximum 2 weeks use   omeprazole (PRILOSEC) 20 MG capsule Take 1 capsule (20 mg) by mouth daily   multivitamin, therapeutic with minerals (MULTI-VITAMIN) TABS Take 1 tablet by mouth daily   meclizine (ANTIVERT) 25 MG tablet Take 1 tablet (25 mg) by mouth 3 times daily as needed for dizziness   TIMOLOL 0.25 % OP SOLN Reported on 3/29/2017   ASPIRIN 81 MG OR TABS ONE DAILY   fluticasone-salmeterol (ADVAIR-HFA) 115-21 MCG/ACT Inhaler Inhale 2 puffs into the lungs 2 times daily   betamethasone acet & sod phos (CELESTONE) 6 (3-3) MG/ML SUSP injection 2 mLs (12 mg) by INTRA-ARTICULAR route once for 1 dose   cetirizine (ZYRTEC) 10 MG tablet Take 1 tablet (10 mg) by mouth every evening       Patient Active Problem List   Diagnosis     Other atopic dermatitis and related conditions     Chronic rhinitis     Generalized osteoarthrosis, unspecified site     Primary localized osteoarthrosis of shoulder region     Stricture and stenosis of esophagus     Anxiety state     HTN (hypertension)     Tobacco use disorder     Hyponatremia     Degenerative arthritis of finger     Hammer toe     Corn     NOEMI positive     Bullous dermatitis     HTN, goal below 140/90     Vertigo     Adenomatous polyp of colon     Lumbosacral neuritis - moderate at L4/5     Degeneration of lumbar intervertebral disc     Simple chronic bronchitis (H)       Past Surgical History:   Procedure Laterality Date     C OSTEOTOMY HIP/FEMUR  1980    traumatic fracture right hip     COLONOSCOPY  10/17/2007    Repeat Colonoscopy in 5 years for surveillance.     ESOPHAGOSCOPY, GASTROSCOPY, DUODENOSCOPY (EGD), COMBINED  1/4/2011    COMBINED ESOPHAGOSCOPY, GASTROSCOPY, DUODENOSCOPY (EGD), ESOPHAGEAL DILATION BALLOON LESS THAN 30MM performed by ELEN BUSBY at  GI     ESOPHAGOSCOPY, GASTROSCOPY, DUODENOSCOPY (EGD), COMBINED N/A 9/25/2015    Procedure: COMBINED ESOPHAGOSCOPY, GASTROSCOPY, DUODENOSCOPY  "(EGD), BIOPSY SINGLE OR MULTIPLE;  Surgeon: Markell Lawson MD;  Location: [facility]     HC COLONOSCOPY THRU STOMA, DIAGNOSTIC  11/15/2000    Recurrent abdominal pain     HC COLONOSCOPY W/WO BRUSH/WASH  07/19/2004     HC REPAIR INCISIONAL HERNIA,REDUCIBLE  11/10/2000    Hernia Repair, Incisional, Unilateral, Left inguinal hernia and umbilical hernia     HC UGI ENDOSCOPY DIAG W OR W/O BRUSH/WASH  07/19/2004     HC UGI ENDOSCOPY DIAG W OR W/O BRUSH/WASH  05/24/2005    Peptic stricture of esophagus, dilated to 18 mm. Erosive reflux esophagitis. Hiatal hernia. Erosive duodenitis.     HC UGI ENDOSCOPY, SIMPLE EXAM  02/20/07     HC UGI ENDOSCOPY, SIMPLE EXAM  12/02/08    Schiatzky's ring, dialated, PPI indefinitely      UGI ENDOSCOPY, SIMPLE EXAM  12/15/2009    benign stricture with dialation     LAPAROSCOPIC APPENDECTOMY  09/15/2005       Social History   Substance Use Topics     Smoking status: Current Every Day Smoker     Packs/day: 1.00     Years: 52.00     Types: Cigarettes     Smokeless tobacco: Current User     Types: Chew      Comment: occasionally/ 1.5tin qwk     Alcohol use No      Comment: quit 7-1980       Most Recent Immunizations   Administered Date(s) Administered     Influenza (H1N1) 01/11/2010     Influenza (High Dose) 3 valent vaccine 10/13/2016     Influenza (IIV3) 10/19/2010     Pneumococcal (PCV 13) 10/13/2016     Pneumococcal 23 valent 10/17/2005     TD (ADULT, 7+) 02/04/2010       BMI: Estimated body mass index is 28.97 kg/(m^2) as calculated from the following:    Height as of 7/25/17: 1.765 m (5' 9.5\").    Weight as of this encounter: 90.3 kg (199 lb).      Review of Systems   Constitutional: Positive for chills. Negative for fever.   Skin: Positive for rash (chest and back, itchy and flaking).   All other systems reviewed and are negative.      Physical Exam   BP: (!) 155/111  Heart Rate: 81  Temp: 97  F (36.1  C)  Resp: 16  Weight: 90.3 kg (199 lb)  SpO2: 97 %  Physical Exam Gen. alert " cooperative male in mild-to-moderate distress.  HEENT shows no scleral icterus or injection.  No facial asymmetry.  Oral mucosa is moist.  Speech is clear and concise.  There is no oral lesions.  Patient has vitiligo on his scalp and cheeks/neck.  Neck is supple without stridor.  No adenopathy.  Patient has a confluent erythematous macular rash on his upper back, shoulders, and upper chest.  No open or draining lesions.  The upper back is excoriated from scratching.  No secondary infection.  No other skin rashes are noted except the vitiligo on his arms.    ED Course     ED Course     Procedures             Critical Care time:  none              Results for orders placed or performed during the hospital encounter of 09/07/17 (from the past 24 hour(s))   CBC with platelets differential   Result Value Ref Range    WBC 8.3 4.0 - 11.0 10e9/L    RBC Count 4.87 4.4 - 5.9 10e12/L    Hemoglobin 14.6 13.3 - 17.7 g/dL    Hematocrit 43.4 40.0 - 53.0 %    MCV 89 78 - 100 fl    MCH 30.0 26.5 - 33.0 pg    MCHC 33.6 31.5 - 36.5 g/dL    RDW 13.4 10.0 - 15.0 %    Platelet Count 248 150 - 450 10e9/L    Diff Method Automated Method     % Neutrophils 65.1 %    % Lymphocytes 20.9 %    % Monocytes 10.6 %    % Eosinophils 2.5 %    % Basophils 0.4 %    % Immature Granulocytes 0.5 %    Absolute Neutrophil 5.4 1.6 - 8.3 10e9/L    Absolute Lymphocytes 1.7 0.8 - 5.3 10e9/L    Absolute Monocytes 0.9 0.0 - 1.3 10e9/L    Absolute Eosinophils 0.2 0.0 - 0.7 10e9/L    Absolute Basophils 0.0 0.0 - 0.2 10e9/L    Abs Immature Granulocytes 0.0 0 - 0.4 10e9/L   Comprehensive metabolic panel   Result Value Ref Range    Sodium 131 (L) 133 - 144 mmol/L    Potassium 3.9 3.4 - 5.3 mmol/L    Chloride 96 94 - 109 mmol/L    Carbon Dioxide 29 20 - 32 mmol/L    Anion Gap 6 3 - 14 mmol/L    Glucose 79 70 - 99 mg/dL    Urea Nitrogen 5 (L) 7 - 30 mg/dL    Creatinine 0.97 0.66 - 1.25 mg/dL    GFR Estimate 74 >60 mL/min/1.7m2    GFR Estimate If Black 90 >60  mL/min/1.7m2    Calcium 8.5 8.5 - 10.1 mg/dL    Bilirubin Total 0.6 0.2 - 1.3 mg/dL    Albumin 3.3 (L) 3.4 - 5.0 g/dL    Protein Total 7.9 6.8 - 8.8 g/dL    Alkaline Phosphatase 79 40 - 150 U/L    ALT 21 0 - 70 U/L    AST 15 0 - 45 U/L   CRP inflammation   Result Value Ref Range    CRP Inflammation 7.5 0.0 - 8.0 mg/L       Medications   lidocaine 1 % 1 mL (not administered)   lidocaine (LMX4) kit (not administered)   sodium chloride (PF) 0.9% PF flush 3 mL (not administered)   sodium chloride (PF) 0.9% PF flush 3 mL (3 mLs Intracatheter Given 9/7/17 1228)   famotidine (PEPCID) injection 20 mg (20 mg Intravenous Given 9/7/17 1224)   diphenhydrAMINE (BENADRYL) injection 50 mg (50 mg Intravenous Given 9/7/17 1225)   methylPREDNISolone sodium succinate (solu-MEDROL) injection 125 mg (125 mg Intravenous Given 9/7/17 1226)     Blood work was obtained and was unremarkable.  Improvement with the itching with Benadryl, prednisone and Pepcid.  Assessments & Plan (with Medical Decision Making)   Patient is a 82-year-old male presents with persistent rash.  This started 2 weeks ago and has persisted despite being treated by primary care with 1% silver sulfadiazine.  He's had no fever or chills.  No new exposures that he is aware of.  No history of photosensitivity.  Denies any chest pain or shortness of breath.  He's had no difficulty speech or swallowing.  He does have vitiligo on his scalp, face, neck and arms but this is unchanged.  On exam patient was afebrile and vital signs are stable.  Is not hypoxic.  He had a confluent macular rash on his upper back, shoulders and chest.  This was not warm to the touch.  There is no fluctuance.  There is some flaking but no mattering.  Patient was given Benadryl, Medrol and Pepcid with Improvement in the Itching.  The exact cause of the patient's rash is unclear.  Suspect drug rash or contact dermatitis.  He's not been any new meds or had new exposures.  He has not been on antibiotics  recently.  We'll have him stop the topical cream as it's not benefiting may worsen the symptoms.  Doubt this is infectious as patient has no fever, no white count and CRP.  He'll be placed on a prednisone burst and taper.  He should take it with food.  Handout on skin rash is provided.  Recommend follow-up with dermatology numbers provided.  Reasons to return to the emergency room discussed.  I have reviewed the nursing notes.    I have reviewed the findings, diagnosis, plan and need for follow up with the patient.       New Prescriptions    PREDNISONE (DELTASONE) 10 MG TABLET    Take 4 tablets daily for 5 days,  take 2 tablets daily for 3 days, take 1 tablet daily for 3 days, take half a tablet for 3 days.       Final diagnoses:   Skin macule or macular rash     This document serves as a record of services personally performed by Tim Jane MD. It was created on their behalf by Dustin Doss, a trained medical scribe. The creation of this record is based on the provider's personal observations and the statements of the patient. This document has been checked and approved by the attending provider.    Note: Chart documentation done in part with Dragon Voice Recognition software. Although reviewed after completion, some word and grammatical errors may remain.    9/7/2017   [facility] EMERGENCY DEPARTMENT     Tim Jane MD  09/07/17 [id number]

## 2017-10-05 ENCOUNTER — OFFICE VISIT (OUTPATIENT)
Dept: FAMILY MEDICINE | Facility: OTHER | Age: 82
End: 2017-10-05
Payer: COMMERCIAL

## 2017-10-05 VITALS
SYSTOLIC BLOOD PRESSURE: 120 MMHG | HEART RATE: 82 BPM | DIASTOLIC BLOOD PRESSURE: 70 MMHG | BODY MASS INDEX: 28.97 KG/M2 | RESPIRATION RATE: 20 BRPM | TEMPERATURE: 97.8 F | WEIGHT: 199 LBS | OXYGEN SATURATION: 97 %

## 2017-10-05 DIAGNOSIS — L85.3 DRY SKIN: ICD-10-CM

## 2017-10-05 DIAGNOSIS — L23.7 CONTACT DERMATITIS DUE TO POISON IVY: Primary | ICD-10-CM

## 2017-10-05 PROCEDURE — 99213 OFFICE O/P EST LOW 20 MIN: CPT | Performed by: FAMILY MEDICINE

## 2017-10-05 RX ORDER — TRIAMCINOLONE ACETONIDE 1 MG/G
CREAM TOPICAL
Qty: 80 G | Refills: 0 | Status: SHIPPED | OUTPATIENT
Start: 2017-10-05 | End: 2018-01-17

## 2017-10-05 ASSESSMENT — PAIN SCALES - GENERAL: PAINLEVEL: NO PAIN (0)

## 2017-10-05 NOTE — NURSING NOTE
"No chief complaint on file.      Initial /70  Pulse 82  Temp 97.8  F (36.6  C) (Temporal)  Resp 20  Wt 199 lb (90.3 kg)  SpO2 97%  BMI 28.97 kg/m2 Estimated body mass index is 28.97 kg/(m^2) as calculated from the following:    Height as of 7/25/17: 5' 9.5\" (1.765 m).    Weight as of this encounter: 199 lb (90.3 kg).  Medication Reconciliation: inocencia Weston      "

## 2017-10-05 NOTE — PROGRESS NOTES
SUBJECTIVE:                                                    Jerry Rodriguez is a 82 year old male who presents to clinic today for the following health issues:     HPI    Pt. Here for f/u rash   Dr. Jane prescribe him a prednisone 10mg by PO pt. Stating this medication is helping of his rash.  Pt. Still feel itching all the time.   Pt. Stating he taking acid pill he think that pill cause his itching.     Skin: The patient says that he is using Lubriderm for his dry skin, twice daily. He said that he was diaphoretic and pruritic on the prednisone pills.    Problem list and histories reviewed & adjusted, as indicated.  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     ESOPHAGOSCOPY, GASTROSCOPY, DUODENOSCOPY (EGD), COMBINED  1/4/2011    COMBINED ESOPHAGOSCOPY, GASTROSCOPY, DUODENOSCOPY (EGD), ESOPHAGEAL DILATION BALLOON LESS THAN 30MM performed by ELEN BUSBY at [facility]     ESOPHAGOSCOPY, GASTROSCOPY, DUODENOSCOPY (EGD), COMBINED N/A 9/25/2015    Procedure: COMBINED ESOPHAGOSCOPY, GASTROSCOPY, DUODENOSCOPY (EGD), BIOPSY SINGLE OR MULTIPLE;  Surgeon: Markell Lawson MD;  Location: [facility]  COLONOSCOPY THRU STOMA, DIAGNOSTIC  11/15/2000    Recurrent abdominal pain     HC COLONOSCOPY W/WO BRUSH/WASH  07/19/2004     HC REPAIR INCISIONAL HERNIA,REDUCIBLE  11/10/2000    Hernia Repair, Incisional, Unilateral, Left inguinal hernia and umbilical hernia     HC UGI ENDOSCOPY DIAG W OR W/O BRUSH/WASH  07/19/2004     HC UGI ENDOSCOPY DIAG W OR W/O BRUSH/WASH  05/24/2005    Peptic stricture of esophagus, dilated to 18 mm. Erosive reflux esophagitis. Hiatal hernia. Erosive duodenitis.     HC UGI ENDOSCOPY, SIMPLE EXAM  02/20/07     HC UGI ENDOSCOPY, SIMPLE EXAM  12/02/08    Schiatzky's ring, dialated, PPI indefinitely     HC UGI ENDOSCOPY, SIMPLE EXAM  12/15/2009    benign stricture with dialation     LAPAROSCOPIC APPENDECTOMY  09/15/2005       Social History   Substance Use Topics     Smoking status: Current Every Day Smoker     Packs/day: 1.00     Years: 52.00     Types: Cigarettes     Smokeless tobacco: Current User     Types: Chew      Comment: occasionally/ 1.5tin qwk     Alcohol use No      Comment: quit 7-1980     Family History   Problem Relation Age of Onset     CANCER Sister      brain tumor     DIABETES Sister      Alzheimer Disease Sister          ROS:  Constitutional, HEENT, cardiovascular, pulmonary, GI, , musculoskeletal, neuro, skin, endocrine and psych systems are negative, except as in HPI or otherwise noted.     This document serves as a record of the services and decisions personally performed and made by Roberta Beaver MD. It was created on her behalf by Roberto Gardner, a trained medical scribe. The creation of this document is based the provider's statements to the medical scribe.  Roberto Gardner, October 5, 2017 3:47 PM     OBJECTIVE:                                                    /70  Pulse 82  Temp 97.8  F (36.6  C) (Temporal)  Resp 20  Wt 90.3 kg (199 lb)  SpO2 97%  BMI 28.97 kg/m2  Body mass index is 28.97 kg/(m^2).     GENERAL: healthy, alert, well nourished, well  hydrated, no distress  SKIN: Examination of the rash reveals: contact dermatitis on upper chest and back otherwise no suspicious lesions  PSYCH: Alert and oriented times 3; speech- coherent , normal rate and volume; able to articulate logical thoughts, able to abstract reason, no tangential thoughts, no hallucinations or delusions, affect- normal    Diagnostic test results:  No results found for this or any previous visit (from the past 24 hour(s)).       ASSESSMENT/PLAN:                                                        ICD-10-CM    1. Contact dermatitis due to poison ivy L23.7    2. Dry skin L85.3 triamcinolone (KENALOG) 0.1 % cream     Skin: the patient is informed his rash most resembles an irritant or contact dermatitis. Previously performed labs were reviewed, no significant results mean it is unlikely a systemic etiology. Patient is advised to finish the oral prednisone and to use a topical steroid to get control of the rash after the oral course is finished. Order placed for Kenalog. Medication direction, dosage, and side effects discussed with patient.    Referral to dermatology given to the patient.      Patient Instructions   Follow up with dermatologist ([place]) or primary care skin clinic ([facility]) if rash does not improve with 2 weeks of topical steroid treatment.     The information in this document, created by the medical scribe for me, accurately reflects the services I personally performed and the decisions made by me. I have reviewed and approved this document for accuracy.   MD Roberta Burciaga MD, MD  [facility]

## 2017-10-05 NOTE — MR AVS SNAPSHOT
After Visit Summary   10/5/2017    Jerry Rordiguez    MRN: [id number]           Patient Information     Date Of Birth          1935        Visit Information        Provider Department      10/5/2017 4:45 PM Roberta Beaver MD [facility]        Today's Diagnoses     Contact dermatitis due to poison ivy    -  1    Dry skin          Care Instructions    Follow up with dermatologist ([place]) or primary care skin clinic ([facility] [place]) if rash does not improve with 2 weeks of topical steroid treatment.           Follow-ups after your visit        Additional Services     DERMATOLOGY REFERRAL       Your provider has referred you to: [facility]: [facility] [phone]   [email or web address]    Please be aware that coverage of these services is subject to the terms and limitations of your health insurance plan.  Call member services at your health plan with any benefit or coverage questions.      Please bring the following with you to your appointment:    (1) Any X-Rays, CTs or MRIs which have been performed.  Contact the facility where they were done to arrange for  prior to your scheduled appointment.    (2) List of current medications  (3) This referral request   (4) Any documents/labs given to you for this referral                  Your next 10 appointments already scheduled     Oct 05, 2017  4:45 PM CDT   SHORT with Roberta Beaver MD   [facility] ([facility])    [address] [place] [address] [id number] [phone]              Who to contact     If you have questions or need follow up information about today's clinic visit or your schedule please contact [facility] directly at [phone].  Normal or non-critical lab and imaging results will be communicated to you by MyChart, letter or phone within 4 business days after the clinic has received the results. If you do not  "hear from us within 7 days, please contact the clinic through Fantasy Buzzer or phone. If you have a critical or abnormal lab result, we will notify you by phone as soon as possible.  Submit refill requests through Fantasy Buzzer or call your pharmacy and they will forward the refill request to us. Please allow 3 business days for your refill to be completed.          Additional Information About Your Visit        [facility] Information     Fantasy Buzzer lets you send messages to your doctor, view your test results, renew your prescriptions, schedule appointments and more. To sign up, go to [email or web address] . Click on \"Log in\" on the left side of the screen, which will take you to the Welcome page. Then click on \"Sign up Now\" on the right side of the page.     You will be asked to enter the access code listed below, as well as some personal information. Please follow the directions to create your username and password.     Your access code is: [email or web address]  Expires: 1/3/2018  3:59 PM     Your access code will  in 90 days. If you need help or a new code, please call your [place] clinic or [phone].        Care EveryWhere ID     This is your Care EveryWhere ID. This could be used by other organizations to access your [place] medical records  [id number]        Your Vitals Were     Pulse Temperature Respirations Pulse Oximetry BMI (Body Mass Index)       82 97.8  F (36.6  C) (Temporal) 20 97% 28.97 kg/m2        Blood Pressure from Last 3 Encounters:   10/05/17 120/70   17 (!) 146/96   17 110/64    Weight from Last 3 Encounters:   10/05/17 199 lb (90.3 kg)   17 199 lb (90.3 kg)   17 194 lb (88 kg)              We Performed the Following     DERMATOLOGY REFERRAL          Where to get your medicines      These medications were sent to [facility]  [facility] [address] [facility]  [id number] [place] [address]     Phone:  [phone]     triamcinolone 0.1 % cream          " Primary Care Provider Office Phone # Fax #    Roberta Beaver [phone]       [address] [place] [address]        Equal Access to Services     TAYLER LOPEZ : Hadsesar tashia samson caroljacquie Kaushal, waaxda luqadaha, qaybta kaalmada shane, kenyatta linton lizettegilson kiser laEmilysekou vallecillo. So [facility] [phone].    ATENCIÓN: Si habla español, tiene a billingsley disposición servicios gratuitos de asistencia lingüística. Llame al [phone].    We comply with applicable federal civil rights laws and [place] laws. We do not discriminate on the basis of race, color, national origin, age, disability, sex, sexual orientation, or gender identity.            Thank you!     Thank you for choosing [facility]  for your care. Our goal is always to provide you with excellent care. Hearing back from our patients is one way we can continue to improve our services. Please take a few minutes to complete the written survey that you may receive in the mail after your visit with us. Thank you!             Your Updated Medication List - Protect others around you: Learn how to safely use, store and throw away your medicines at www.disposemymeds.org.          This list is accurate as of: 10/5/17  4:00 PM.  Always use your most recent med list.                   Brand Name Dispense Instructions for use Diagnosis    ADVIL PO      Take 400 mg by mouth        albuterol 108 (90 BASE) MCG/ACT Inhaler    VENTOLIN HFA    18 g    INHALE TWO PUFFS BY MOUTH EVERY SIX HOURS AS NEEDED FOR SHORTNESS OF BREATH OR WHEEZING.    Cough       amLODIPine 5 MG tablet    NORVASC    90 tablet    Take 1 tablet (5 mg) by mouth daily    Essential hypertension       aspirin 81 MG tablet      ONE DAILY        betamethasone acet & sod phos 6 (3-3) MG/ML Susp injection    CELESTONE    2 mL    2 mLs (12 mg) by INTRA-ARTICULAR route once for 1 dose    Primary osteoarthritis of both knees       busPIRone 5 MG tablet    BUSPAR    180 tablet    Take 1  tablet (5 mg) by mouth 2 times daily    Anxiety state       cetirizine 10 MG tablet    zyrTEC    30 tablet    Take 1 tablet (10 mg) by mouth every evening    Seasonal allergic rhinitis, unspecified allergic rhinitis trigger       fluticasone-salmeterol 115-21 MCG/ACT Inhaler    ADVAIR-HFA    3 Inhaler    Inhale 2 puffs into the lungs 2 times daily    Simple chronic bronchitis (H)       meclizine 25 MG tablet    ANTIVERT    30 tablet    Take 1 tablet (25 mg) by mouth 3 times daily as needed for dizziness    Vertigo       Multi-vitamin Tabs tablet      Take 1 tablet by mouth daily        omeprazole 20 MG CR capsule    priLOSEC    90 capsule    Take 1 capsule (20 mg) by mouth daily    Gastroesophageal reflux disease without esophagitis       silver sulfADIAZINE 1 % cream    SILVADENE     Apply topically daily        timolol 0.5 % ophthalmic solution    TIMOPTIC     Apply 1 drop to eye 2 times daily        timolol hemihydrate 0.25 % Soln ophthalmic solution    BETIMOL     Reported on 3/29/2017        triamcinolone 0.1 % cream    KENALOG    80 g    Apply thin layer to affected areas two times a day as needed for itch. Maximum 2 weeks use    Dry skin       VALVED HOLDING CHAMBER Sydney     1 each    1 Device 2 times daily To be used with flovent    Simple chronic bronchitis (H)

## 2017-10-05 NOTE — PATIENT INSTRUCTIONS
Follow up with dermatologist ([place]) or primary care skin clinic ([facility] [place]) if rash does not improve with 2 weeks of topical steroid treatment.

## 2017-10-09 DIAGNOSIS — F41.1 ANXIETY STATE: ICD-10-CM

## 2017-10-09 NOTE — TELEPHONE ENCOUNTER
Pending Prescriptions:                       Disp   Refills    busPIRone (BUSPAR) 5 MG tablet [Pharmacy *180 ta*0            Sig: TAKE ONE TABLET BY MOUTH TWICE DAILY            Last Written Prescription Date: 3/29/2017  Last Fill Quantity: 180; # refills: 1  Last Office Visit with [facility], [facility] or [facility] prescribing provider:  10/5/2017        Last PHQ-9 score on record= No flowsheet data found.    Lab Results   Component Value Date    AST 15 09/07/2017     Lab Results   Component Value Date    ALT 21 09/07/09/07/2017

## 2017-10-11 RX ORDER — BUSPIRONE HYDROCHLORIDE 5 MG/1
TABLET ORAL
Qty: 180 TABLET | Refills: 3 | Status: SHIPPED | OUTPATIENT
Start: 2017-10-11 | End: 2018-10-05

## 2017-10-11 NOTE — TELEPHONE ENCOUNTER
Prescription approved per [facility] Refill Protocol.  Samantha Burgess, RN, BSN

## 2017-10-30 DIAGNOSIS — J41.0 SIMPLE CHRONIC BRONCHITIS (H): ICD-10-CM

## 2017-11-01 RX ORDER — FLUTICASONE PROPIONATE AND SALMETEROL XINAFOATE 115; 21 UG/1; UG/1
AEROSOL, METERED RESPIRATORY (INHALATION)
Qty: 12 G | Refills: 0 | OUTPATIENT
Start: 2017-11-01

## 2017-11-01 NOTE — TELEPHONE ENCOUNTER
fluticasone-salmeterol (ADVAIR-HFA) 115-21 MCG/ACT Inhaler  Rx was sent 08/10/2017 for 3 inhalers and 1 refills. Patient should have medication through 02/18.  Pharmacy notified via E-prescribe refusal.  Mary Hook, RN, BSN

## 2017-11-22 ENCOUNTER — TELEPHONE (OUTPATIENT)
Dept: FAMILY MEDICINE | Facility: OTHER | Age: 82
End: 2017-11-22

## 2017-11-22 ENCOUNTER — ALLIED HEALTH/NURSE VISIT (OUTPATIENT)
Dept: FAMILY MEDICINE | Facility: OTHER | Age: 82
End: 2017-11-22
Payer: COMMERCIAL

## 2017-11-22 DIAGNOSIS — H61.23 BILATERAL IMPACTED CERUMEN: Primary | ICD-10-CM

## 2017-11-22 PROCEDURE — 99207 ZZC NO CHARGE NURSE ONLY: CPT

## 2017-11-22 NOTE — MR AVS SNAPSHOT
"              After Visit Summary   11/22/2017    Jerry Rodriguez    MRN: [id number]           Patient Information     Date Of Birth          1935        Visit Information        Provider Department      11/22/2017 2:00 PM BE[facility] B, [facility]        Today's Diagnoses     Bilateral impacted cerumen    -  1       Follow-ups after your visit        Your next 10 appointments already scheduled     Nov 22, 2017  2:00 PM CST   Nurse Only with [facility] B, [facility] ([facility])    [address]  [facility] [address] [phone]              Who to contact     If you have questions or need follow up information about today's clinic visit or your schedule please contact [facility] directly at [phone].  Normal or non-critical lab and imaging results will be communicated to you by Tujiahart, letter or phone within 4 business days after the clinic has received the results. If you do not hear from us within 7 days, please contact the clinic through Tujiahart or phone. If you have a critical or abnormal lab result, we will notify you by phone as soon as possible.  Submit refill requests through Longaccess or call your pharmacy and they will forward the refill request to us. Please allow 3 business days for your refill to be completed.          Additional Information About Your Visit        MyChart Information     Longaccess lets you send messages to your doctor, view your test results, renew your prescriptions, schedule appointments and more. To sign up, go to [email or web address] . Click on \"Log in\" on the left side of the screen, which will take you to the Welcome page. Then click on \"Sign up Now\" on the right side of the page.     You will be asked to enter the access code listed below, as well as some personal information. Please follow the directions to create your username and password.     Your access code is: " [email or web address]  Expires: 1/3/2018  2:59 PM     Your access code will  in 90 days. If you need help or a new code, please call your [place] clinic or [phone].        Care EveryWhere ID     This is your Care EveryWhere ID. This could be used by other organizations to access your [place] medical records  [id number]         Blood Pressure from Last 3 Encounters:   10/05/17 120/70   17 (!) 146/96   17 110/64    Weight from Last 3 Encounters:   10/05/17 199 lb (90.3 kg)   17 199 lb (90.3 kg)   17 194 lb (88 kg)              We Performed the Following     [facility]        Primary Care Provider Office Phone # Fax #    Roberta Beaver [phone]       [address] [place] [address]        Equal Access to Services     TAYLER LOPEZ : Hadii aad ku hadasho Soomaali, waaxda luqadaha, qaybta kaalmada adeegyada, waxay jenniferin haysekou nobles . So [facility] [phone].    ATENCIÓN: Si habla español, tiene a billingsley disposición servicios gratuitos de asistencia lingüística. Llame al [phone].    We comply with applicable federal civil rights laws and [place] laws. We do not discriminate on the basis of race, color, national origin, age, disability, sex, sexual orientation, or gender identity.            Thank you!     Thank you for choosing [facility]  for your care. Our goal is always to provide you with excellent care. Hearing back from our patients is one way we can continue to improve our services. Please take a few minutes to complete the written survey that you may receive in the mail after your visit with us. Thank you!             Your Updated Medication List - Protect others around you: Learn how to safely use, store and throw away your medicines at www.disposemymeds.org.          This list is accurate as of: 17  1:59 PM.  Always use your most recent med list.                   Brand Name Dispense Instructions for use  Diagnosis    ADVIL PO      Take 400 mg by mouth        albuterol 108 (90 BASE) MCG/ACT Inhaler    VENTOLIN HFA    18 g    INHALE TWO PUFFS BY MOUTH EVERY SIX HOURS AS NEEDED FOR SHORTNESS OF BREATH OR WHEEZING.    Cough       amLODIPine 5 MG tablet    NORVASC    90 tablet    Take 1 tablet (5 mg) by mouth daily    Essential hypertension       aspirin 81 MG tablet      ONE DAILY        betamethasone acet & sod phos 6 (3-3) MG/ML Susp injection    CELESTONE    2 mL    2 mLs (12 mg) by INTRA-ARTICULAR route once for 1 dose    Primary osteoarthritis of both knees       busPIRone 5 MG tablet    BUSPAR    180 tablet    TAKE ONE TABLET BY MOUTH TWICE DAILY    Anxiety state       cetirizine 10 MG tablet    zyrTEC    30 tablet    Take 1 tablet (10 mg) by mouth every evening    Seasonal allergic rhinitis, unspecified allergic rhinitis trigger       fluticasone-salmeterol 115-21 MCG/ACT Inhaler    ADVAIR-HFA    3 Inhaler    Inhale 2 puffs into the lungs 2 times daily    Simple chronic bronchitis (H)       meclizine 25 MG tablet    ANTIVERT    30 tablet    Take 1 tablet (25 mg) by mouth 3 times daily as needed for dizziness    Vertigo       Multi-vitamin Tabs tablet      Take 1 tablet by mouth daily        omeprazole 20 MG CR capsule    priLOSEC    90 capsule    Take 1 capsule (20 mg) by mouth daily    Gastroesophageal reflux disease without esophagitis       silver sulfADIAZINE 1 % cream    SILVADENE     Apply topically daily        timolol 0.5 % ophthalmic solution    TIMOPTIC     Apply 1 drop to eye 2 times daily        timolol hemihydrate 0.25 % Soln ophthalmic solution    BETIMOL     Reported on 3/29/2017        triamcinolone 0.1 % cream    KENALOG    80 g    Apply thin layer to affected areas two times a day as needed for itch. Maximum 2 weeks use    Dry skin       VALVED HOLDING CHAMBER Sydney     1 each    1 Device 2 times daily To be used with flovent    Simple chronic bronchitis (H)

## 2017-11-22 NOTE — TELEPHONE ENCOUNTER
You placed a referral for patient to dermatology on 10/5/17.  Patient has not scheduled as of yet.      Please review and forward to team if follow up with the patient is needed.     Thank you!  Tiffanie/Clinic Referrals Dyad II

## 2017-11-22 NOTE — PROGRESS NOTES
Bilateral ear wash completed and tolerated well. RN reassessed the ears and both were clear of wax. Mary Hook RN, BSN

## 2017-11-22 NOTE — PROGRESS NOTES
Jerry Rodriguez is a 82 year old year old male who presents to the clinic today for a possible cerumen impaction.  A plugged sensation is felt bilaterally. The patient reports no pain or injury. Right ear shows a blue colored marking, appearing to be from a ball point pen. Upon questioning, the patient admits he attempted to itch the ear with a pen. No foreign body is noted.    OBJECTIVE:   Initial assessment by this RN shows that cerumen impaction is present bilaterally.  VORB from Dr. Roberta Beaver for MA to wash the ears with a repeat RN assessment after the fact to confirm impaction removal.    The process was completed by other clinic staff, see additional charting from VARSHA Carroll and CARRIE Hernandez. I also counseled the patient to avoid sticking any object such as a pen in his ears in the future.    Jonnathan Shepard, RN, BSN

## 2017-12-16 DIAGNOSIS — K21.9 GASTROESOPHAGEAL REFLUX DISEASE WITHOUT ESOPHAGITIS: ICD-10-CM

## 2017-12-19 NOTE — TELEPHONE ENCOUNTER
Requested Prescriptions   Pending Prescriptions Disp Refills     omeprazole (PRILOSEC) 20 MG CR capsule [Pharmacy Med Name: Omeprazole Oral Capsule Delayed Release 20 MG] 90 capsule 2     Sig: TAKE ONE CAPSULE BY MOUTH ONE TIME DAILY    PPI Protocol Passed    12/16/2017  9:53 AM       Passed - Not on Clopidogrel (unless Pantoprazole ordered)       Passed - No diagnosis of osteoporosis on record       Passed - Recent or future visit with authorizing provider's specialty    Patient had office visit in the last year or has a visit in the next 30 days with authorizing provider.  See chart review.     10/05/2017         Passed - Patient is age 18 or older        Prescription approved per [facility] Refill Protocol.  Omar Bennett, RN, BSN

## 2017-12-22 ENCOUNTER — TELEPHONE (OUTPATIENT)
Dept: FAMILY MEDICINE | Facility: OTHER | Age: 82
End: 2017-12-22

## 2017-12-22 NOTE — TELEPHONE ENCOUNTER
Called and spoke with patient regarding message below.  Patient stated he is still using a bottle from Giancarlo Fairchild from 2015 and is needing it refilled.  States he is using it once daily.  Informed patient that he will need to be seen for this medication, since that provider is not long with us.  Patient has an appointment on 1/3/18 with AE and will discuss then.  Brisa Steel CMA ([facility])

## 2017-12-22 NOTE — TELEPHONE ENCOUNTER
Received a message from pharmacy to send a new rx for flonase nasal suspension 50mcg/act spray into both nostrils bid.  This isn't on current med list LOV 10/5/17

## 2017-12-26 NOTE — PROGRESS NOTES
SUBJECTIVE:                                                    Jerry Rodriguez is a 82 year old male who presents to clinic today for the following health issues:    HPI    Headache      Duration: 6 months    Description (location/character/radiation): right side    Intensity:  mild, moderate, severe    Accompanying signs and symptoms: none    History (similar episodes/previous evaluation): yes    Precipitating or alleviating factors: None    Therapies tried and outcome: laying down     Headache localized by patient to right side of head, intermittent, no hearing or vision changes. Don't wake him up at night. His wife reports that the headaches are worse when he gets nervous, takes a meclizine and lies down when he gets upset or anxious.     Dysuria  His wife reports that he has increased urinary frequency, and his urine is dark yellow. He reports that he only really drinks Dr. Pepper and coffee. Doesn't really drink too much water.     Chronic Bronchitis  His previous prescription for Advair has increased in price for them, is now over 400$ for them, need to switch to a different brand/formula.       Problem list and histories reviewed & adjusted, as indicated.  Additional history: as documented    Patient Active Problem List   Diagnosis     Other atopic dermatitis and related conditions     Chronic rhinitis     Generalized osteoarthrosis, unspecified site     Primary localized osteoarthrosis of shoulder region     Stricture and stenosis of esophagus     Anxiety state     HTN (hypertension)     Tobacco use disorder     Hyponatremia     Degenerative arthritis of finger     Hammer toe     Corn     NOEMI positive     Bullous dermatitis     HTN, goal below 140/90     Vertigo     Adenomatous polyp of colon     Lumbosacral neuritis - moderate at L4/5     Degeneration of lumbar intervertebral disc     Simple chronic bronchitis (H)     Past Surgical History:   Procedure Laterality Date     C OSTEOTOMY HIP/FEMUR  1980     traumatic fracture right hip     COLONOSCOPY  10/17/2007    Repeat Colonoscopy in 5 years for surveillance.     ESOPHAGOSCOPY, GASTROSCOPY, DUODENOSCOPY (EGD), COMBINED  1/4/2011    COMBINED ESOPHAGOSCOPY, GASTROSCOPY, DUODENOSCOPY (EGD), ESOPHAGEAL DILATION BALLOON LESS THAN 30MM performed by ELEN BUSBY at  GI     ESOPHAGOSCOPY, GASTROSCOPY, DUODENOSCOPY (EGD), COMBINED N/A 9/25/2015    Procedure: COMBINED ESOPHAGOSCOPY, GASTROSCOPY, DUODENOSCOPY (EGD), BIOPSY SINGLE OR MULTIPLE;  Surgeon: Markell Lawson MD;  Location:  GI     HC COLONOSCOPY THRU STOMA, DIAGNOSTIC  11/15/2000    Recurrent abdominal pain     HC COLONOSCOPY W/WO BRUSH/WASH  07/19/2004     HC REPAIR INCISIONAL HERNIA,REDUCIBLE  11/10/2000    Hernia Repair, Incisional, Unilateral, Left inguinal hernia and umbilical hernia     HC UGI ENDOSCOPY DIAG W OR W/O BRUSH/WASH  07/19/2004     HC UGI ENDOSCOPY DIAG W OR W/O BRUSH/WASH  05/24/2005    Peptic stricture of esophagus, dilated to 18 mm. Erosive reflux esophagitis. Hiatal hernia. Erosive duodenitis.     HC UGI ENDOSCOPY, SIMPLE EXAM  02/20/07     HC UGI ENDOSCOPY, SIMPLE EXAM  12/02/08    Schiatzky's ring, dialated, PPI indefinitely     HC UGI ENDOSCOPY, SIMPLE EXAM  12/15/2009    benign stricture with dialation     LAPAROSCOPIC APPENDECTOMY  09/15/2005       Social History   Substance Use Topics     Smoking status: Current Every Day Smoker     Packs/day: 1.00     Years: 52.00     Types: Cigarettes     Smokeless tobacco: Current User     Types: Chew      Comment: occasionally/ 1.5tin qwk     Alcohol use No      Comment: quit 7-1980     Family History   Problem Relation Age of Onset     CANCER Sister      brain tumor     DIABETES Sister      Alzheimer Disease Sister            ROS:  Constitutional, HEENT, cardiovascular, pulmonary, GI, , musculoskeletal, neuro, skin, endocrine and psych systems are negative, except as in HPI or otherwise noted.     This document serves as a record of the  "services and decisions personally performed and made by Roberta Beaver MD. It was created on her behalf by Maggie Tristan, a trained medical scribe. The creation of this document is based the provider's statements to the medical scribe.  Maggie Tristan, January 3, 2018 11:37 AM     OBJECTIVE:                                                    /58  Pulse 86  Temp 97.9  F (36.6  C) (Temporal)  Resp 15  Ht 1.765 m (5' 9.5\")  Wt 91.6 kg (202 lb)  SpO2 96%  BMI 29.4 kg/m2  Body mass index is 29.4 kg/(m^2).   GENERAL: healthy, alert, well nourished, well hydrated, no distress, smells strongly of tobacco  HENT: ear canals- normal; TMs- normal; Nose- chronic rhinitis; Mouth- nasal drainage present  NECK: no tenderness, no adenopathy, no asymmetry, no masses, no stiffness; thyroid- normal to palpation  RESP: lungs clear to auscultation - no rales, no rhonchi, no wheezes  CV: regular rates and rhythm, normal S1 S2, no S3 or S4 and no murmur, no click or rub  SKIN: he has several small bruises across his right hand and arm.   PSYCH: Alert and oriented times 3; speech- coherent , normal rate and volume; able to articulate logical thoughts, able to abstract reason, no tangential thoughts, no hallucinations or delusions, affect- normal    Diagnostic test results:  No results found for this or any previous visit (from the past 24 hour(s)).       ASSESSMENT/PLAN:                                                      ICD-10-CM    1. Anxiety state F41.1 GASTROENTEROLOGY ADULT REF PROCEDURE ONLY   2. Screen for colon cancer Z12.11    3. Need for prophylactic vaccination and inoculation against influenza Z23    4. Chronic rhinitis, unspecified type J31.0 fluticasone (FLONASE) 50 MCG/ACT spray   5. Simple chronic bronchitis (H) J41.0 Fluticasone-Salmeterol (AIRDUO RESPICLICK 232/14) 232-14 MCG/ACT AEPB   6. Hyponatremia E87.1 Basic metabolic panel  (Ca, Cl, CO2, Creat, Gluc, K, Na, BUN)   7. Adenomatous polyp of descending colon D12.4 " GASTROENTEROLOGY ADULT REF PROCEDURE ONLY   8. Vertigo R42 meclizine (ANTIVERT) 25 MG tablet     Headache: Recommended he do something productive when he gets upset or nervous like take a walk, deep breathing, or something else calming other than just taking a meclizine and going to sleep. Refilled meclizine. Will check the sodium levels in his blood today, draw a BMP, in case that could be a factor.   Dysuria: Advised patient that if he only drinks soda and coffee, both of which are high in caffeine, he will get dehydrated and have the symptoms he is having. He needs to stay hydrated and drink more water.   Chronic Bronchitis: Prescribed Airduo instead of Advair, should be better covered by their insurance. Refilled Flonase for chronic rhinitis as well.   Screenings: Advised that his colonoscopy is due, if he feels well enough can have the procedure repeated. Discussed need for colonoscopy competes with his age and whether he will find it useful given his age and likelihood of need of longevity may make this not worthwhile. Colonoscopy referral placed.   Skin: Assured him that the bruising on his hands is most likely due from his aspirin use. As he ages, skin thins, gets easier to bruise.     There are no Patient Instructions on file for this visit.    The information in this document, created by the medical scribe for me, accurately reflects the services I personally performed and the decisions made by me. I have reviewed and approved this document for accuracy.   MD Roberta Burciaga MD, MD  [facility]

## 2018-01-03 ENCOUNTER — OFFICE VISIT (OUTPATIENT)
Dept: FAMILY MEDICINE | Facility: OTHER | Age: 83
End: 2018-01-03
Payer: COMMERCIAL

## 2018-01-03 VITALS
TEMPERATURE: 97.9 F | SYSTOLIC BLOOD PRESSURE: 120 MMHG | RESPIRATION RATE: 15 BRPM | OXYGEN SATURATION: 96 % | BODY MASS INDEX: 28.92 KG/M2 | HEART RATE: 86 BPM | DIASTOLIC BLOOD PRESSURE: 58 MMHG | HEIGHT: 70 IN | WEIGHT: 202 LBS

## 2018-01-03 DIAGNOSIS — E87.1 HYPONATREMIA: ICD-10-CM

## 2018-01-03 DIAGNOSIS — R42 VERTIGO: ICD-10-CM

## 2018-01-03 DIAGNOSIS — J31.0 CHRONIC RHINITIS, UNSPECIFIED TYPE: ICD-10-CM

## 2018-01-03 DIAGNOSIS — Z23 NEED FOR PROPHYLACTIC VACCINATION AND INOCULATION AGAINST INFLUENZA: ICD-10-CM

## 2018-01-03 DIAGNOSIS — J41.0 SIMPLE CHRONIC BRONCHITIS (H): ICD-10-CM

## 2018-01-03 DIAGNOSIS — D12.4 ADENOMATOUS POLYP OF DESCENDING COLON: ICD-10-CM

## 2018-01-03 DIAGNOSIS — F41.1 ANXIETY STATE: Primary | ICD-10-CM

## 2018-01-03 DIAGNOSIS — Z12.11 SCREEN FOR COLON CANCER: ICD-10-CM

## 2018-01-03 PROCEDURE — 99214 OFFICE O/P EST MOD 30 MIN: CPT | Performed by: FAMILY MEDICINE

## 2018-01-03 RX ORDER — FLUTICASONE PROPIONATE 50 MCG
1-2 SPRAY, SUSPENSION (ML) NASAL DAILY
Qty: 1 BOTTLE | Refills: 11 | Status: ON HOLD | OUTPATIENT
Start: 2018-01-03 | End: 2020-05-23

## 2018-01-03 RX ORDER — MECLIZINE HYDROCHLORIDE 25 MG/1
25 TABLET ORAL 3 TIMES DAILY PRN
Qty: 30 TABLET | Refills: 12 | Status: SHIPPED | OUTPATIENT
Start: 2018-01-03 | End: 2019-03-06

## 2018-01-03 RX ORDER — FLUTICASONE PROPIONATE AND SALMETEROL 232; 14 UG/1; UG/1
1 POWDER, METERED RESPIRATORY (INHALATION) 2 TIMES DAILY
Qty: 1 EACH | Refills: 3 | Status: SHIPPED | OUTPATIENT
Start: 2018-01-03 | End: 2019-06-19

## 2018-01-03 ASSESSMENT — PAIN SCALES - GENERAL: PAINLEVEL: NO PAIN (0)

## 2018-01-03 NOTE — MR AVS SNAPSHOT
After Visit Summary   1/3/2018    Jerry Rodriguez    MRN: [id number]           Patient Information     Date Of Birth          1935        Visit Information        Provider Department      1/3/2018 11:30 AM Roberta Beaver MD [facility]        Today's Diagnoses     Anxiety state    -  1    Screen for colon cancer        Need for prophylactic vaccination and inoculation against influenza        Chronic rhinitis, unspecified type        Simple chronic bronchitis (H)        Hyponatremia        Adenomatous polyp of descending colon        Vertigo           Follow-ups after your visit        Additional Services     GASTROENTEROLOGY ADULT REF PROCEDURE ONLY       Last Lab Result: Creatinine (mg/dL)       Date                     Value                 09/07/2017               0.97             ----------  Body mass index is 29.4 kg/(m^2).     Needed:  No  Language:  English    Patient will be contacted to schedule procedure.     Please be aware that coverage of these services is subject to the terms and limitations of your health insurance plan.  Call member services at your health plan with any benefit or coverage questions.  Any procedures must be performed at a [facility] facility OR coordinated by your clinic's referral office.    Please bring the following with you to your appointment:    (1) Any X-Rays, CTs or MRIs which have been performed.  Contact the facility where they were done to arrange for  prior to your scheduled appointment.    (2) List of current medications   (3) This referral request   (4) Any documents/labs given to you for this referral                  Who to contact     If you have questions or need follow up information about today's clinic visit or your schedule please contact [facility] directly at [phone].  Normal or non-critical lab and imaging results will be communicated to you by MyChart, letter or phone within 4  "business days after the clinic has received the results. If you do not hear from us within 7 days, please contact the clinic through [employer] or phone. If you have a critical or abnormal lab result, we will notify you by phone as soon as possible.  Submit refill requests through [employer] or call your pharmacy and they will forward the refill request to us. Please allow 3 business days for your refill to be completed.          Additional Information About Your Visit        [employer] Information     [employer] lets you send messages to your doctor, view your test results, renew your prescriptions, schedule appointments and more. To sign up, go to [email or web address] . Click on \"Log in\" on the left side of the screen, which will take you to the Welcome page. Then click on \"Sign up Now\" on the right side of the page.     You will be asked to enter the access code listed below, as well as some personal information. Please follow the directions to create your username and password.     Your access code is: [email or web address]  Expires: 1/3/2018  2:59 PM     Your access code will  in 90 days. If you need help or a new code, please call your [facility] clinic or [phone].        Care EveryWhere ID     This is your Care EveryWhere ID. This could be used by other organizations to access your [facility] medical records  [id number]        Your Vitals Were     Pulse Temperature Respirations Height Pulse Oximetry BMI (Body Mass Index)    86 97.9  F (36.6  C) (Temporal) 15 5' 9.5\" (1.765 m) 96% 29.4 kg/m2       Blood Pressure from Last 3 Encounters:   18 120/58   10/05/17 120/70   17 (!) 146/96    Weight from Last 3 Encounters:   18 202 lb (91.6 kg)   10/05/17 199 lb (90.3 kg)   17 199 lb (90.3 kg)              We Performed the Following     Basic metabolic panel  (Ca, Cl, CO2, Creat, Gluc, K, Na, BUN)     GASTROENTEROLOGY ADULT REF PROCEDURE ONLY          Today's Medication Changes          These changes are " accurate as of: 1/3/18 11:53 AM.  If you have any questions, ask your nurse or doctor.               Start taking these medicines.        Dose/Directions    fluticasone 50 MCG/ACT spray   Commonly known as:  FLONASE   Used for:  Chronic rhinitis, unspecified type   Started by:  Roberta Beaver MD        Dose:  1-2 spray   Spray 1-2 sprays into both nostrils daily   Quantity:  1 Bottle   Refills:  11       Fluticasone-Salmeterol 232-14 MCG/ACT Aepb   Commonly known as:  AIRDUO RESPICLICK 232/14   Used for:  Simple chronic bronchitis (H)   Replaces:  fluticasone-salmeterol 115-21 MCG/ACT Inhaler   Started by:  Roberta Beaver MD        Dose:  1 Device   Inhale 1 Device into the lungs 2 times daily   Quantity:  1 each   Refills:  3         Stop taking these medicines if you haven't already. Please contact your care team if you have questions.     fluticasone-salmeterol 115-21 MCG/ACT Inhaler   Commonly known as:  ADVAIR-HFA   Replaced by:  Fluticasone-Salmeterol 232-14 MCG/ACT Aepb   Stopped by:  Roberta Beaver MD                Where to get your medicines      These medications were sent to [facility] [address] [place] [id number]     Phone:  [phone]     fluticasone 50 MCG/ACT spray    Fluticasone-Salmeterol 232-14 MCG/ACT Aepb    meclizine 25 MG tablet                Primary Care Provider Office Phone # Fax #    Roberta Beaver [phone]       [address] [place] [address]        Equal Access to Services     [facility] : Hadsesar Easley, johanna martinez, qaybkenyatta gatica. So [facility] [phone].    ATENCIÓN: Si habla español, tiene a billingsley disposición servicios gratuitos de asistencia lingüística. Llame al [phone].    We comply with applicable federal civil rights laws and [place] laws. We do not discriminate on the basis of race, color, national  origin, age, disability, sex, sexual orientation, or gender identity.            Thank you!     Thank you for choosing [facility]  for your care. Our goal is always to provide you with excellent care. Hearing back from our patients is one way we can continue to improve our services. Please take a few minutes to complete the written survey that you may receive in the mail after your visit with us. Thank you!             Your Updated Medication List - Protect others around you: Learn how to safely use, store and throw away your medicines at www.disposemymeds.org.          This list is accurate as of: 1/3/18 11:53 AM.  Always use your most recent med list.                   Brand Name Dispense Instructions for use Diagnosis    ADVIL PO      Take 400 mg by mouth        albuterol 108 (90 BASE) MCG/ACT Inhaler    VENTOLIN HFA    18 g    INHALE TWO PUFFS BY MOUTH EVERY SIX HOURS AS NEEDED FOR SHORTNESS OF BREATH OR WHEEZING.    Cough       amLODIPine 5 MG tablet    NORVASC    90 tablet    Take 1 tablet (5 mg) by mouth daily    Essential hypertension       aspirin 81 MG tablet      ONE DAILY        betamethasone acet & sod phos 6 (3-3) MG/ML Susp injection    CELESTONE    2 mL    2 mLs (12 mg) by INTRA-ARTICULAR route once for 1 dose    Primary osteoarthritis of both knees       busPIRone 5 MG tablet    BUSPAR    180 tablet    TAKE ONE TABLET BY MOUTH TWICE DAILY    Anxiety state       cetirizine 10 MG tablet    zyrTEC    30 tablet    Take 1 tablet (10 mg) by mouth every evening    Seasonal allergic rhinitis, unspecified allergic rhinitis trigger       FLOVENT DISKUS IN           fluticasone 50 MCG/ACT spray    FLONASE    1 Bottle    Spray 1-2 sprays into both nostrils daily    Chronic rhinitis, unspecified type       Fluticasone-Salmeterol 232-14 MCG/ACT Aepb    AIRDUO RESPICLICK 232/14    1 each    Inhale 1 Device into the lungs 2 times daily    Simple chronic bronchitis (H)       meclizine 25 MG tablet     ANTIVERT    30 tablet    Take 1 tablet (25 mg) by mouth 3 times daily as needed for dizziness    Vertigo       Multi-vitamin Tabs tablet      Take 1 tablet by mouth daily        omeprazole 20 MG CR capsule    priLOSEC    90 capsule    TAKE ONE CAPSULE BY MOUTH ONE TIME DAILY    Gastroesophageal reflux disease without esophagitis       silver sulfADIAZINE 1 % cream    SILVADENE     Apply topically daily        timolol 0.5 % ophthalmic solution    TIMOPTIC     Apply 1 drop to eye 2 times daily        timolol hemihydrate 0.25 % Soln ophthalmic solution    BETIMOL     Reported on 3/29/2017        triamcinolone 0.1 % cream    KENALOG    80 g    Apply thin layer to affected areas two times a day as needed for itch. Maximum 2 weeks use    Dry skin       VALVED HOLDING CHAMBER Syndey     1 each    1 Device 2 times daily To be used with flovent    Simple chronic bronchitis (H)

## 2018-01-03 NOTE — NURSING NOTE
"Chief Complaint   Patient presents with     Sinus Problem     Health Maintenance     mychart, flu, colon cancer, jamshid       Initial /58  Pulse 86  Temp 97.9  F (36.6  C) (Temporal)  Resp 15  Ht 5' 9.5\" (1.765 m)  Wt 202 lb (91.6 kg)  SpO2 96%  BMI 29.4 kg/m2 Estimated body mass index is 29.4 kg/(m^2) as calculated from the following:    Height as of this encounter: 5' 9.5\" (1.765 m).    Weight as of this encounter: 202 lb (91.6 kg).  Medication Reconciliation: complete  "

## 2018-01-04 ENCOUNTER — TELEPHONE (OUTPATIENT)
Dept: FAMILY MEDICINE | Facility: OTHER | Age: 83
End: 2018-01-04

## 2018-01-04 NOTE — TELEPHONE ENCOUNTER
Called patient to schedule colonoscopy. Patient states that it is too cold to think about that. He said that he would like to call us when he is ready to schedule.

## 2018-01-15 ENCOUNTER — TELEPHONE (OUTPATIENT)
Dept: FAMILY MEDICINE | Facility: OTHER | Age: 83
End: 2018-01-15

## 2018-01-15 DIAGNOSIS — K22.2 STRICTURE AND STENOSIS OF ESOPHAGUS: Primary | ICD-10-CM

## 2018-01-15 NOTE — TELEPHONE ENCOUNTER
Spoke to patient, he is referring to his esophageal stricture, he believes he needs this dilated again

## 2018-01-15 NOTE — TELEPHONE ENCOUNTER
Reason for Call: Request for an order or referral:    Order or referral being requested: colonoscopy and chest test? Patient did not know what it was called.    Date needed: as soon as possible    Has the patient been seen by the PCP for this problem? YES    Additional comments: patient states he called [facility] to schedule and was told to contact his primary first.    Phone number Patient can be reached at:  Home number on file [phone] (home)    Best Time:  any    Can we leave a detailed message on this number?  YES    Call taken on 1/15/2018 at 12:27 PM by Yulissa Matta

## 2018-01-15 NOTE — TELEPHONE ENCOUNTER
Sorry, I cannot remember anything else.  May need to ask him for a hint as I don't see anything documented or if he just has symptoms he wants us to evaluate.  Roberta Beaver MD

## 2018-01-15 NOTE — TELEPHONE ENCOUNTER
Wasn't aware he was symptomatic again for this.  Will order EGD as well, please see if they can coordinate these together.  Roberta Beaver MD

## 2018-01-17 ENCOUNTER — TELEPHONE (OUTPATIENT)
Dept: FAMILY MEDICINE | Facility: OTHER | Age: 83
End: 2018-01-17

## 2018-01-17 DIAGNOSIS — Z12.11 COLON CANCER SCREENING: Primary | ICD-10-CM

## 2018-01-17 RX ORDER — BISACODYL 5 MG/1
15 TABLET, DELAYED RELEASE ORAL ONCE
Qty: 4 TABLET | Refills: 0 | Status: SHIPPED | OUTPATIENT
Start: 2018-01-17 | End: 2018-01-17

## 2018-01-17 NOTE — TELEPHONE ENCOUNTER
Dr. Beaver- are you willing to addend recent office to to clear patient for anesthesia. Colonoscopy/EGD scheduled 1/24.  If not patient will need to schedule preop with you.    Thank you  Angela Zamora  Surgical Scheduler

## 2018-01-17 NOTE — TELEPHONE ENCOUNTER
Scheduled colon/EGD 1/24, prep placed at ER  for .    Due to age, please call in [facility] to [employer]

## 2018-01-18 ENCOUNTER — OFFICE VISIT (OUTPATIENT)
Dept: FAMILY MEDICINE | Facility: OTHER | Age: 83
End: 2018-01-18
Payer: COMMERCIAL

## 2018-01-18 VITALS
TEMPERATURE: 96.8 F | HEIGHT: 70 IN | SYSTOLIC BLOOD PRESSURE: 110 MMHG | WEIGHT: 203 LBS | BODY MASS INDEX: 29.06 KG/M2 | OXYGEN SATURATION: 97 % | DIASTOLIC BLOOD PRESSURE: 50 MMHG | HEART RATE: 90 BPM | RESPIRATION RATE: 14 BRPM

## 2018-01-18 DIAGNOSIS — Z01.818 PREOP GENERAL PHYSICAL EXAM: Primary | ICD-10-CM

## 2018-01-18 DIAGNOSIS — Z12.11 SCREEN FOR COLON CANCER: ICD-10-CM

## 2018-01-18 PROCEDURE — 99213 OFFICE O/P EST LOW 20 MIN: CPT | Performed by: FAMILY MEDICINE

## 2018-01-18 PROCEDURE — 93000 ELECTROCARDIOGRAM COMPLETE: CPT | Performed by: FAMILY MEDICINE

## 2018-01-18 ASSESSMENT — ANXIETY QUESTIONNAIRES
GAD7 TOTAL SCORE: 2
2. NOT BEING ABLE TO STOP OR CONTROL WORRYING: NOT AT ALL
5. BEING SO RESTLESS THAT IT IS HARD TO SIT STILL: NOT AT ALL
GAD7 TOTAL SCORE: 2
GAD7 TOTAL SCORE: 2
1. FEELING NERVOUS, ANXIOUS, OR ON EDGE: NOT AT ALL
7. FEELING AFRAID AS IF SOMETHING AWFUL MIGHT HAPPEN: NOT AT ALL
6. BECOMING EASILY ANNOYED OR IRRITABLE: SEVERAL DAYS
7. FEELING AFRAID AS IF SOMETHING AWFUL MIGHT HAPPEN: NOT AT ALL
4. TROUBLE RELAXING: NOT AT ALL
3. WORRYING TOO MUCH ABOUT DIFFERENT THINGS: SEVERAL DAYS

## 2018-01-18 ASSESSMENT — PAIN SCALES - GENERAL: PAINLEVEL: NO PAIN (0)

## 2018-01-18 NOTE — MR AVS SNAPSHOT
After Visit Summary   1/18/2018    Jerry Rodriguez    MRN: [id number]           Patient Information     Date Of Birth          1935        Visit Information        Provider Department      1/18/2018 2:00 PM Roberta Beaver MD [facility]        Today's Diagnoses     Preop general physical exam    -  1    Screen for colon cancer          Care Instructions    DO NOT take any OTC NSAID pain medications 7 days before your surgery. Hold your aspirin as well.       Before Your Surgery      Call your surgeon if there is any change in your health. This includes signs of a cold or flu (such as a sore throat, runny nose, cough, rash or fever).    Do not smoke, drink alcohol or take over the counter medicine (unless your surgeon or primary care doctor tells you to) for the 24 hours before and after surgery.    If you take prescribed drugs: Follow your doctor s orders about which medicines to take and which to stop until after surgery.    Eating and drinking prior to surgery: follow the instructions from your surgeon    Take a shower or bath the night before surgery. Use the soap your surgeon gave you to gently clean your skin. If you do not have soap from your surgeon, use your regular soap. Do not shave or scrub the surgery site.  Wear clean pajamas and have clean sheets on your bed.           Follow-ups after your visit        Your next 10 appointments already scheduled     Jan 24, 2018   Procedure with Don Arambula MD   [facility] ([facility])    [address] [place] [id number] [phone]              Who to contact     If you have questions or need follow up information about today's clinic visit or your schedule please contact [facility] directly at [phone].  Normal or non-critical lab and imaging results will be communicated to you by MyChart, letter or phone within 4 business days after the clinic has  "received the results. If you do not hear from us within 7 days, please contact the clinic through House Party or phone. If you have a critical or abnormal lab result, we will notify you by phone as soon as possible.  Submit refill requests through House Party or call your pharmacy and they will forward the refill request to us. Please allow 3 business days for your refill to be completed.          Additional Information About Your Visit        House Party Information     House Party lets you send messages to your doctor, view your test results, renew your prescriptions, schedule appointments and more. To sign up, go to [email or web address] . Click on \"Log in\" on the left side of the screen, which will take you to the Welcome page. Then click on \"Sign up Now\" on the right side of the page.     You will be asked to enter the access code listed below, as well as some personal information. Please follow the directions to create your username and password.     Your access code is: [email or web address]  Expires: 2018  3:43 PM     Your access code will  in 90 days. If you need help or a new code, please call your [facility] clinic or [phone].        Care EveryWhere ID     This is your Care EveryWhere ID. This could be used by other organizations to access your [facility] medical records  [id number]        Your Vitals Were     Pulse Temperature Respirations Height Pulse Oximetry BMI (Body Mass Index)    90 96.8  F (36  C) (Temporal) 14 5' 10\" (1.778 m) 97% 29.13 kg/m2       Blood Pressure from Last 3 Encounters:   18 110/50   18 120/58   10/05/17 120/70    Weight from Last 3 Encounters:   18 203 lb (92.1 kg)   18 202 lb (91.6 kg)   10/05/17 199 lb (90.3 kg)              We Performed the Following     EKG 12-lead complete w/read - Clinics        Primary Care Provider Office Phone # Fax #    Roberta Beaver [phone]       [address] [facility] [address]        Equal Access to Services  "    [facility] Hospital[facility]ION[facility] : Hadii aad ku kojo Easley, waaxda luqadaha, qaybta kaalmada adekeesha, kenyatta maggie allenjuan martinez italiajessica nobles . So [facility] [phone].    ATENCIÓN: Si habla espaudelia, tiene a billingsley disposición servicios gratuitos de asistencia lingüística. Llame al [phone].    We comply with applicable federal civil rights laws and [place] laws. We do not discriminate on the basis of race, color, national origin, age, disability, sex, sexual orientation, or gender identity.            Thank you!     Thank you for choosing [facility]  for your care. Our goal is always to provide you with excellent care. Hearing back from our patients is one way we can continue to improve our services. Please take a few minutes to complete the written survey that you may receive in the mail after your visit with us. Thank you!             Your Updated Medication List - Protect others around you: Learn how to safely use, store and throw away your medicines at www.disposemymeds.org.          This list is accurate as of: 1/18/18  3:43 PM.  Always use your most recent med list.                   Brand Name Dispense Instructions for use Diagnosis    ADVIL PO      Take 400 mg by mouth        albuterol 108 (90 BASE) MCG/ACT Inhaler    VENTOLIN HFA    18 g    INHALE TWO PUFFS BY MOUTH EVERY SIX HOURS AS NEEDED FOR SHORTNESS OF BREATH OR WHEEZING.    Cough       amLODIPine 5 MG tablet    NORVASC    90 tablet    Take 1 tablet (5 mg) by mouth daily    Essential hypertension       aspirin 81 MG tablet      ONE DAILY        busPIRone 5 MG tablet    BUSPAR    180 tablet    TAKE ONE TABLET BY MOUTH TWICE DAILY    Anxiety state       cetirizine 10 MG tablet    zyrTEC    30 tablet    Take 1 tablet (10 mg) by mouth every evening    Seasonal allergic rhinitis, unspecified allergic rhinitis trigger       FLOVENT DISKUS IN           fluticasone 50 MCG/ACT spray    FLONASE    1 Bottle    Spray 1-2 sprays into both nostrils daily     Chronic rhinitis, unspecified type       Fluticasone-Salmeterol 232-14 MCG/ACT Aepb    AIRDUO RESPICLICK 232/14    1 each    Inhale 1 Device into the lungs 2 times daily    Simple chronic bronchitis (H)       meclizine 25 MG tablet    ANTIVERT    30 tablet    Take 1 tablet (25 mg) by mouth 3 times daily as needed for dizziness    Vertigo       Multi-vitamin Tabs tablet      Take 1 tablet by mouth daily        omeprazole 20 MG CR capsule    priLOSEC    90 capsule    TAKE ONE CAPSULE BY MOUTH ONE TIME DAILY    Gastroesophageal reflux disease without esophagitis       timolol 0.5 % ophthalmic solution    TIMOPTIC     Apply 1 drop to eye 2 times daily        timolol hemihydrate 0.25 % Soln ophthalmic solution    BETIMOL     Reported on 3/29/2017        VALVED HOLDING CHAMBER [place]     1 each    1 Device 2 times daily To be used with flovent    Simple chronic bronchitis (H)

## 2018-01-18 NOTE — PATIENT INSTRUCTIONS
DO NOT take any OTC NSAID pain medications 7 days before your surgery. Hold your aspirin as well.       Before Your Surgery      Call your surgeon if there is any change in your health. This includes signs of a cold or flu (such as a sore throat, runny nose, cough, rash or fever).    Do not smoke, drink alcohol or take over the counter medicine (unless your surgeon or primary care doctor tells you to) for the 24 hours before and after surgery.    If you take prescribed drugs: Follow your doctor s orders about which medicines to take and which to stop until after surgery.    Eating and drinking prior to surgery: follow the instructions from your surgeon    Take a shower or bath the night before surgery. Use the soap your surgeon gave you to gently clean your skin. If you do not have soap from your surgeon, use your regular soap. Do not shave or scrub the surgery site.  Wear clean pajamas and have clean sheets on your bed.

## 2018-01-18 NOTE — PROGRESS NOTES
[address]  [facility] [address]  [phone]  Dept: [phone]    PRE-OP EVALUATION:  Today's date: 2018    Jerry Rodriguez (: 1935) presents for pre-operative evaluation assessment as requested by Dr. Don Arambula.  He requires evaluation and anesthesia risk assessment prior to undergoing surgery/procedure for treatment of polyps .  Proposed procedure: ESOPHAGOSCOPY, GASTROSCOPY, DUODENOSCOPY (EGD), COLONOSCOPY    Date of Surgery/ Procedure: 2018  Time of Surgery/ Procedure: 8:00am  Hospital/Surgical Facility: [facility]  Primary Physician: Roberta Beaver  Type of Anesthesia Anticipated: General    Patient has a Health Care Directive or Living Will:  YES     Preop Questions 2018   1.  Do you have a history of heart attack, stroke, stent, bypass or surgery on an artery in the head, neck, heart or legs? No   2.  Do you ever have any pain or discomfort in your chest? YES- Pt has an esophageal stricture and pain with swallowing   3.  Do you have a history of  Heart Failure? NO   4.   Are you troubled by shortness of breath when:  walking on a level surface, or up a slight hill, or at night? YES - See below   5.  Do you currently have a cold, bronchitis or other respiratory infection? No   6.  Do you have a cough, shortness of breath, or wheezing? No   7.  Do you sometimes get pains in the calves of your legs when you walk? YES- He has some difficulty when taking the garbage out   8. Do you or anyone in your family have previous history of blood clots? No   9.  Do you or does anyone in your family have a serious bleeding problem such as prolonged bleeding following surgeries or cuts? No   10. Have you ever had problems with anemia or been told to take iron pills? No   11. Have you had any abnormal blood loss such as black, tarry or bloody stools? No   12. Have you ever had a blood transfusion? No   13. Have you or any of your relatives  ever had problems with anesthesia? No   14. Do you have sleep apnea, excessive snoring or daytime drowsiness? No   15. Do you have any prosthetic heart valves? No   16. Do you have prosthetic joints? No           HPI:                                                      Brief HPI related to upcoming procedure: Patient is due for a screening colonoscopy and EGD. He has a history of Colon polyps, stricture and stenosis of esophagus, hiatal hernia and erosive duodenitis.       See problem list for active medical problems.  Problems all longstanding and stable, except as noted/documented.  See ROS for pertinent symptoms related to these conditions.                                                                                                  .    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  Moderate at L4/5.  MRI 7/2014       Degeneration of lumbar intervertebral disc 08/01/2014     Priority: Medium     HTN, goal below 140/90 03/31/2014     Priority: Medium     Vertigo 03/31/2014     Priority: Medium     Adenomatous polyp of colon 03/31/2014     Priority: Medium     Bullous dermatitis 08/13/2013     Priority: Medium     NOEMI positive 02/20/2013     Priority: Medium     Hammer toe 02/06/2013     Priority: Medium     Corn 02/06/2013     Priority: Medium     Degenerative arthritis of finger 05/23/2011     Priority: Medium     (Problem list name updated by automated process. Provider to review and confirm.)       Hyponatremia 01/13/2011     Priority: Medium     Tobacco use disorder 02/13/2009     Priority: Medium     HTN (hypertension) 01/28/2009     Priority: Medium     Anxiety state      Priority: Medium     Problem list name updated by automated process. Provider to review       Stricture  and stenosis of esophagus 08/02/2004     Priority: Medium     Primary localized osteoarthrosis of shoulder region 01/07/2004     Priority: Medium     Problem list name updated by automated process. Provider to review       Chronic rhinitis 04/05/2002     Priority: Medium     Generalized osteoarthrosis, unspecified site 04/05/2002     Priority: Medium     Other atopic dermatitis and related conditions      Priority: Medium      Past Medical History:   Diagnosis Date     Anxiety state, unspecified      Hypertension      Hyponatremia      Inguinal hernia with obstruction, without mention of gangrene, unilateral or unspecified, (not specified as recurrent)     Strangulated right inguinal hernia years ago     Inguinal hernia without mention of obstruction or gangrene, unilateral or unspecified, (not specified as recurrent) 11/07/2000    Left inguinal hernia, sliding and not incarcerated     Other joint derangement, not elsewhere classified, forearm 1980    traumatic fracture     Pathologic fracture of neck of femur (H)     Surgery for hip fracture     Stricture and stenosis of esophagus 2004     Past Surgical History:   Procedure Laterality Date     C OSTEOTOMY HIP/FEMUR  1980    traumatic fracture right hip     COLONOSCOPY  10/17/2007    Repeat Colonoscopy in 5 years for surveillance.     ESOPHAGOSCOPY, GASTROSCOPY, DUODENOSCOPY (EGD), COMBINED  1/4/2011    COMBINED ESOPHAGOSCOPY, GASTROSCOPY, DUODENOSCOPY (EGD), ESOPHAGEAL DILATION BALLOON LESS THAN 30MM performed by ELEN BUSBY at  GI     ESOPHAGOSCOPY, GASTROSCOPY, DUODENOSCOPY (EGD), COMBINED N/A 9/25/2015    Procedure: COMBINED ESOPHAGOSCOPY, GASTROSCOPY, DUODENOSCOPY (EGD), BIOPSY SINGLE OR MULTIPLE;  Surgeon: Markell Lawson MD;  Location:  GI      COLONOSCOPY THRU STOMA, DIAGNOSTIC  11/15/2000    Recurrent abdominal pain     HC COLONOSCOPY W/WO BRUSH/WASH  07/19/2004     HC REPAIR INCISIONAL HERNIA,REDUCIBLE  11/10/2000    Hernia Repair, Incisional,  Unilateral, Left inguinal hernia and umbilical hernia     HC UGI ENDOSCOPY DIAG W OR W/O BRUSH/WASH  07/19/2004     HC UGI ENDOSCOPY DIAG W OR W/O BRUSH/WASH  05/24/2005    Peptic stricture of esophagus, dilated to 18 mm. Erosive reflux esophagitis. Hiatal hernia. Erosive duodenitis.      UGI ENDOSCOPY, SIMPLE EXAM  02/20/07     HC UGI ENDOSCOPY, SIMPLE EXAM  12/02/08    Schiatzky's ring, dialated, PPI indefinitely     HC UGI ENDOSCOPY, SIMPLE EXAM  12/15/2009    benign stricture with dialation     LAPAROSCOPIC APPENDECTOMY  09/15/2005     Current Outpatient Prescriptions   Medication Sig Dispense Refill     fluticasone (FLONASE) 50 MCG/ACT spray Spray 1-2 sprays into both nostrils daily 1 Bottle 11     Fluticasone Propionate, Inhal, (FLOVENT DISKUS IN)        Fluticasone-Salmeterol (AIRDUO RESPICLICK 232/14) 232-14 MCG/ACT AEPB Inhale 1 Device into the lungs 2 times daily 1 each 3     meclizine (ANTIVERT) 25 MG tablet Take 1 tablet (25 mg) by mouth 3 times daily as needed for dizziness 30 tablet 12     omeprazole (PRILOSEC) 20 MG CR capsule TAKE ONE CAPSULE BY MOUTH ONE TIME DAILY  90 capsule 2     busPIRone (BUSPAR) 5 MG tablet TAKE ONE TABLET BY MOUTH TWICE DAILY  180 tablet 3     albuterol (VENTOLIN HFA) 108 (90 BASE) MCG/ACT Inhaler INHALE TWO PUFFS BY MOUTH EVERY SIX HOURS AS NEEDED FOR SHORTNESS OF BREATH OR WHEEZING. 18 g 3     Spacer/Aero-Holding Chambers (VALVED HOLDING CHAMBER) JULISSA 1 Device 2 times daily To be used with flovent 1 each 1     cetirizine (ZYRTEC) 10 MG tablet Take 1 tablet (10 mg) by mouth every evening 30 tablet 9     amLODIPine (NORVASC) 5 MG tablet Take 1 tablet (5 mg) by mouth daily 90 tablet 3     timolol (TIMOPTIC) 0.5 % ophthalmic solution Apply 1 drop to eye 2 times daily       Ibuprofen (ADVIL PO) Take 400 mg by mouth       multivitamin, therapeutic with minerals (MULTI-VITAMIN) TABS Take 1 tablet by mouth daily       TIMOLOL 0.25 % OP SOLN Reported on 3/29/2017  0     ASPIRIN 81  "MG OR TABS ONE DAILY       OTC products: NSAIDS and Aspirin    Allergies   Allergen Reactions     No Known Drug Allergies       Latex Allergy: NO    Social History   Substance Use Topics     Smoking status: Current Every Day Smoker     Packs/day: 1.00     Years: 52.00     Types: Cigarettes     Smokeless tobacco: Current User     Types: Chew      Comment: occasionally/ 1.5tin qwk     Alcohol use No      Comment: quit 7-1980     History   Drug Use No       REVIEW OF SYSTEMS:                                                      Constitutional, HEENT, cardiovascular, pulmonary, GI, , musculoskeletal, neuro, skin, endocrine and psych systems are negative, except as in HPI or otherwise noted.     This document serves as a record of the services and decisions personally performed and made by Roberta Beaver MD. It was created on her behalf by Maggie Tristan, a trained medical scribe. The creation of this document is based the provider's statements to the medical scribe.  Maggie Tristan, January 18, 2018 2:36 PM       EXAM:                                                    /50  Pulse 90  Temp 96.8  F (36  C) (Temporal)  Resp 14  Ht 1.778 m (5' 10\")  Wt 92.1 kg (203 lb)  SpO2 97%  BMI 29.13 kg/m2    Refer to exam from most recent visit 1/3/18    DIAGNOSTICS:                                                    EKG: appears normal, NSR, normal axis, normal intervals, no acute ST/T changes c/w ischemia, no LVH by voltage criteria, Q waves in anterior leads, unchanged from previous tracings.    Recent Labs   Lab Test  09/07/17   1218  08/10/17   1119  04/26/16   1036   11/16/09   2120   HGB  14.6   --   15.4   < >  13.7   PLT  248   --   214   < >  204   INR   --    --    --    --   1.01   NA  131*  128*  131*   < >  123*   POTASSIUM  3.9  4.1  4.0   < >  4.3   CR  0.97  0.81  0.87   < >  1.01    < > = values in this interval not displayed.      IMPRESSION:                                                    Reason for " surgery/procedure: Colon cancer screening/Colonoscopy and EGD  Diagnosis/reason for consult: 82 year-old male with history of colon polyps and esophageal stricture, otherwise stable.     The proposed surgical procedure is considered LOW risk.    REVISED CARDIAC RISK INDEX  The patient has the following serious cardiovascular risks for perioperative complications such as (MI, PE, VFib and 3  AV Block):  No serious cardiac risks  INTERPRETATION: 0 risks: Class I (very low risk - 0.4% complication rate)    The patient has the following additional risks for perioperative complications:  No identified additional risks      ICD-10-CM    1. Screen for colon cancer Z12.11    2. Preop general physical exam Z01.818 EKG 12-lead complete w/read - Clinics       RECOMMENDATIONS:                                                      --Patient is to take all scheduled medications on the day of surgery EXCEPT for modifications listed below.    Anticoagulant or Antiplatelet Medication Use  ASPIRIN: Discontinue ASA 7-10 days prior to procedure to reduce bleeding risk.  It should be resumed post-operatively.    APPROVAL GIVEN to proceed with proposed procedure, without further diagnostic evaluation     The information in this document, created by the medical scribe for me, accurately reflects the services I personally performed and the decisions made by me. I have reviewed and approved this document for accuracy.   Roberta Beaver MD       Signed Electronically by: Roberta Beaver MD, MD    Copy of this evaluation report is provided to requesting physician.    [facility] Preop Guidelines

## 2018-01-19 ENCOUNTER — TELEPHONE (OUTPATIENT)
Dept: FAMILY MEDICINE | Facility: OTHER | Age: 83
End: 2018-01-19

## 2018-01-19 ASSESSMENT — ANXIETY QUESTIONNAIRES: GAD7 TOTAL SCORE: 2

## 2018-01-19 NOTE — TELEPHONE ENCOUNTER
Attempted to contact them NA and no VM will try again however if they call back he is having an EGD

## 2018-01-19 NOTE — TELEPHONE ENCOUNTER
Please call pt or wife.  Insurance co wants to know the name oif the other test being done when the pt has his colonoscopy. Please call to advise.  thanks

## 2018-01-22 ENCOUNTER — TELEPHONE (OUTPATIENT)
Dept: FAMILY MEDICINE | Facility: OTHER | Age: 83
End: 2018-01-22

## 2018-01-22 NOTE — TELEPHONE ENCOUNTER
Reason for Call:  Other     Detailed comments: pt states has colonoscopy on Wednesday and wondering if he can have his medications tomorrow or not. Please contact pt in regards     Phone Number Patient can be reached at: Home number on file [phone] (home)    Best Time: ANY    Can we leave a detailed message on this number? YES    Call taken on 1/22/2018 at 12:42 PM by Lucía Jaimes

## 2018-01-22 NOTE — TELEPHONE ENCOUNTER
Called patient and read him Dr. Beaver's preop instructions from 1/18/2018. See below.  Maddie Ricketts CMA      --Patient is to take all scheduled medications on the day of surgery EXCEPT for modifications listed below.     Anticoagulant or Antiplatelet Medication Use  ASPIRIN: Discontinue ASA 7-10 days prior to procedure to reduce bleeding risk.  It should be resumed post-operatively.     APPROVAL GIVEN to proceed with proposed procedure, without further diagnostic evaluation      The information in this document, created by the medical scribe for me, accurately reflects the services I personally performed and the decisions made by me. I have reviewed and approved this document for accuracy.   Roberta Beaver MD         Signed Electronically by: Roberta Beaver MD, MD

## 2018-01-23 NOTE — H&P (VIEW-ONLY)
[address]  [facility] [address]  [phone]  Dept: [phone]    PRE-OP EVALUATION:  Today's date: 2018    Jerry Rodriguez (: 1935) presents for pre-operative evaluation assessment as requested by Dr. Don Arambula.  He requires evaluation and anesthesia risk assessment prior to undergoing surgery/procedure for treatment of polyps .  Proposed procedure: ESOPHAGOSCOPY, GASTROSCOPY, DUODENOSCOPY (EGD), COLONOSCOPY    Date of Surgery/ Procedure: 2018  Time of Surgery/ Procedure: 8:00am  Hospital/Surgical Facility: [facility]  Primary Physician: Roberta Beaver  Type of Anesthesia Anticipated: General    Patient has a Health Care Directive or Living Will:  YES     Preop Questions 2018   1.  Do you have a history of heart attack, stroke, stent, bypass or surgery on an artery in the head, neck, heart or legs? No   2.  Do you ever have any pain or discomfort in your chest? YES- Pt has an esophageal stricture and pain with swallowing   3.  Do you have a history of  Heart Failure? NO   4.   Are you troubled by shortness of breath when:  walking on a level surface, or up a slight hill, or at night? YES - See below   5.  Do you currently have a cold, bronchitis or other respiratory infection? No   6.  Do you have a cough, shortness of breath, or wheezing? No   7.  Do you sometimes get pains in the calves of your legs when you walk? YES- He has some difficulty when taking the garbage out   8. Do you or anyone in your family have previous history of blood clots? No   9.  Do you or does anyone in your family have a serious bleeding problem such as prolonged bleeding following surgeries or cuts? No   10. Have you ever had problems with anemia or been told to take iron pills? No   11. Have you had any abnormal blood loss such as black, tarry or bloody stools? No   12. Have you ever had a blood transfusion? No   13. Have you or any of your relatives  ever had problems with anesthesia? No   14. Do you have sleep apnea, excessive snoring or daytime drowsiness? No   15. Do you have any prosthetic heart valves? No   16. Do you have prosthetic joints? No           HPI:                                                      Brief HPI related to upcoming procedure: Patient is due for a screening colonoscopy and EGD. He has a history of Colon polyps, stricture and stenosis of esophagus, hiatal hernia and erosive duodenitis.       See problem list for active medical problems.  Problems all longstanding and stable, except as noted/documented.  See ROS for pertinent symptoms related to these conditions.                                                                                                  .    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  Moderate at L4/5.  MRI 7/2014       Degeneration of lumbar intervertebral disc 08/01/2014     Priority: Medium     HTN, goal below 140/90 03/31/2014     Priority: Medium     Vertigo 03/31/2014     Priority: Medium     Adenomatous polyp of colon 03/31/2014     Priority: Medium     Bullous dermatitis 08/13/2013     Priority: Medium     NOEMI positive 02/20/2013     Priority: Medium     Hammer toe 02/06/2013     Priority: Medium     Corn 02/06/2013     Priority: Medium     Degenerative arthritis of finger 05/23/2011     Priority: Medium     (Problem list name updated by automated process. Provider to review and confirm.)       Hyponatremia 01/13/2011     Priority: Medium     Tobacco use disorder 02/13/2009     Priority: Medium     HTN (hypertension) 01/28/2009     Priority: Medium     Anxiety state      Priority: Medium     Problem list name updated by automated process. Provider to review       Stricture  and stenosis of esophagus 08/02/2004     Priority: Medium     Primary localized osteoarthrosis of shoulder region 01/07/2004     Priority: Medium     Problem list name updated by automated process. Provider to review       Chronic rhinitis 04/05/2002     Priority: Medium     Generalized osteoarthrosis, unspecified site 04/05/2002     Priority: Medium     Other atopic dermatitis and related conditions      Priority: Medium      Past Medical History:   Diagnosis Date     Anxiety state, unspecified      Hypertension      Hyponatremia      Inguinal hernia with obstruction, without mention of gangrene, unilateral or unspecified, (not specified as recurrent)     Strangulated right inguinal hernia years ago     Inguinal hernia without mention of obstruction or gangrene, unilateral or unspecified, (not specified as recurrent) 11/07/2000    Left inguinal hernia, sliding and not incarcerated     Other joint derangement, not elsewhere classified, forearm 1980    traumatic fracture     Pathologic fracture of neck of femur (H)     Surgery for hip fracture     Stricture and stenosis of esophagus 2004     Past Surgical History:   Procedure Laterality Date     C OSTEOTOMY HIP/FEMUR  1980    traumatic fracture right hip     COLONOSCOPY  10/17/2007    Repeat Colonoscopy in 5 years for surveillance.     ESOPHAGOSCOPY, GASTROSCOPY, DUODENOSCOPY (EGD), COMBINED  1/4/2011    COMBINED ESOPHAGOSCOPY, GASTROSCOPY, DUODENOSCOPY (EGD), ESOPHAGEAL DILATION BALLOON LESS THAN 30MM performed by ELEN BUSBY at  GI     ESOPHAGOSCOPY, GASTROSCOPY, DUODENOSCOPY (EGD), COMBINED N/A 9/25/2015    Procedure: COMBINED ESOPHAGOSCOPY, GASTROSCOPY, DUODENOSCOPY (EGD), BIOPSY SINGLE OR MULTIPLE;  Surgeon: Markell Lawson MD;  Location:  GI      COLONOSCOPY THRU STOMA, DIAGNOSTIC  11/15/2000    Recurrent abdominal pain     HC COLONOSCOPY W/WO BRUSH/WASH  07/19/2004     HC REPAIR INCISIONAL HERNIA,REDUCIBLE  11/10/2000    Hernia Repair, Incisional,  Unilateral, Left inguinal hernia and umbilical hernia     HC UGI ENDOSCOPY DIAG W OR W/O BRUSH/WASH  07/19/2004     HC UGI ENDOSCOPY DIAG W OR W/O BRUSH/WASH  05/24/2005    Peptic stricture of esophagus, dilated to 18 mm. Erosive reflux esophagitis. Hiatal hernia. Erosive duodenitis.      UGI ENDOSCOPY, SIMPLE EXAM  02/20/07     HC UGI ENDOSCOPY, SIMPLE EXAM  12/02/08    Schiatzky's ring, dialated, PPI indefinitely     HC UGI ENDOSCOPY, SIMPLE EXAM  12/15/2009    benign stricture with dialation     LAPAROSCOPIC APPENDECTOMY  09/15/2005     Current Outpatient Prescriptions   Medication Sig Dispense Refill     fluticasone (FLONASE) 50 MCG/ACT spray Spray 1-2 sprays into both nostrils daily 1 Bottle 11     Fluticasone Propionate, Inhal, (FLOVENT DISKUS IN)        Fluticasone-Salmeterol (AIRDUO RESPICLICK 232/14) 232-14 MCG/ACT AEPB Inhale 1 Device into the lungs 2 times daily 1 each 3     meclizine (ANTIVERT) 25 MG tablet Take 1 tablet (25 mg) by mouth 3 times daily as needed for dizziness 30 tablet 12     omeprazole (PRILOSEC) 20 MG CR capsule TAKE ONE CAPSULE BY MOUTH ONE TIME DAILY  90 capsule 2     busPIRone (BUSPAR) 5 MG tablet TAKE ONE TABLET BY MOUTH TWICE DAILY  180 tablet 3     albuterol (VENTOLIN HFA) 108 (90 BASE) MCG/ACT Inhaler INHALE TWO PUFFS BY MOUTH EVERY SIX HOURS AS NEEDED FOR SHORTNESS OF BREATH OR WHEEZING. 18 g 3     Spacer/Aero-Holding Chambers (VALVED HOLDING CHAMBER) JULISSA 1 Device 2 times daily To be used with flovent 1 each 1     cetirizine (ZYRTEC) 10 MG tablet Take 1 tablet (10 mg) by mouth every evening 30 tablet 9     amLODIPine (NORVASC) 5 MG tablet Take 1 tablet (5 mg) by mouth daily 90 tablet 3     timolol (TIMOPTIC) 0.5 % ophthalmic solution Apply 1 drop to eye 2 times daily       Ibuprofen (ADVIL PO) Take 400 mg by mouth       multivitamin, therapeutic with minerals (MULTI-VITAMIN) TABS Take 1 tablet by mouth daily       TIMOLOL 0.25 % OP SOLN Reported on 3/29/2017  0     ASPIRIN 81  "MG OR TABS ONE DAILY       OTC products: NSAIDS and Aspirin    Allergies   Allergen Reactions     No Known Drug Allergies       Latex Allergy: NO    Social History   Substance Use Topics     Smoking status: Current Every Day Smoker     Packs/day: 1.00     Years: 52.00     Types: Cigarettes     Smokeless tobacco: Current User     Types: Chew      Comment: occasionally/ 1.5tin qwk     Alcohol use No      Comment: quit 7-1980     History   Drug Use No       REVIEW OF SYSTEMS:                                                      Constitutional, HEENT, cardiovascular, pulmonary, GI, , musculoskeletal, neuro, skin, endocrine and psych systems are negative, except as in HPI or otherwise noted.     This document serves as a record of the services and decisions personally performed and made by Roberta Beaver MD. It was created on her behalf by Maggie Tristan, a trained medical scribe. The creation of this document is based the provider's statements to the medical scribe.  Maggie Tristan, January 18, 2018 2:36 PM       EXAM:                                                    /50  Pulse 90  Temp 96.8  F (36  C) (Temporal)  Resp 14  Ht 1.778 m (5' 10\")  Wt 92.1 kg (203 lb)  SpO2 97%  BMI 29.13 kg/m2    Refer to exam from most recent visit 1/3/18    DIAGNOSTICS:                                                    EKG: appears normal, NSR, normal axis, normal intervals, no acute ST/T changes c/w ischemia, no LVH by voltage criteria, Q waves in anterior leads, unchanged from previous tracings.    Recent Labs   Lab Test  09/07/17   1218  08/10/17   1119  04/26/16   1036   11/16/09   2120   HGB  14.6   --   15.4   < >  13.7   PLT  248   --   214   < >  204   INR   --    --    --    --   1.01   NA  131*  128*  131*   < >  123*   POTASSIUM  3.9  4.1  4.0   < >  4.3   CR  0.97  0.81  0.87   < >  1.01    < > = values in this interval not displayed.      IMPRESSION:                                                    Reason for " surgery/procedure: Colon cancer screening/Colonoscopy and EGD  Diagnosis/reason for consult: 82 year-old male with history of colon polyps and esophageal stricture, otherwise stable.     The proposed surgical procedure is considered LOW risk.    REVISED CARDIAC RISK INDEX  The patient has the following serious cardiovascular risks for perioperative complications such as (MI, PE, VFib and 3  AV Block):  No serious cardiac risks  INTERPRETATION: 0 risks: Class I (very low risk - 0.4% complication rate)    The patient has the following additional risks for perioperative complications:  No identified additional risks      ICD-10-CM    1. Screen for colon cancer Z12.11    2. Preop general physical exam Z01.818 EKG 12-lead complete w/read - Clinics       RECOMMENDATIONS:                                                      --Patient is to take all scheduled medications on the day of surgery EXCEPT for modifications listed below.    Anticoagulant or Antiplatelet Medication Use  ASPIRIN: Discontinue ASA 7-10 days prior to procedure to reduce bleeding risk.  It should be resumed post-operatively.    APPROVAL GIVEN to proceed with proposed procedure, without further diagnostic evaluation     The information in this document, created by the medical scribe for me, accurately reflects the services I personally performed and the decisions made by me. I have reviewed and approved this document for accuracy.   Roberta Beaver MD       Signed Electronically by: Roberta Beaver MD, MD    Copy of this evaluation report is provided to requesting physician.    [facility] Preop Guidelines

## 2018-01-24 ENCOUNTER — HOSPITAL ENCOUNTER (OUTPATIENT)
Facility: CLINIC | Age: 83
Discharge: HOME | End: 2018-01-24
Attending: INTERNAL MEDICINE | Admitting: INTERNAL MEDICINE
Payer: MEDICARE

## 2018-01-24 ENCOUNTER — TELEPHONE (OUTPATIENT)
Dept: FAMILY MEDICINE | Facility: OTHER | Age: 83
End: 2018-01-24

## 2018-01-24 ENCOUNTER — SURGERY (OUTPATIENT)
Age: 83
End: 2018-01-24

## 2018-01-24 VITALS
RESPIRATION RATE: 16 BRPM | TEMPERATURE: 97.6 F | OXYGEN SATURATION: 94 % | SYSTOLIC BLOOD PRESSURE: 119 MMHG | DIASTOLIC BLOOD PRESSURE: 72 MMHG

## 2018-01-24 LAB
COLONOSCOPY: NORMAL
UPPER GI ENDOSCOPY: NORMAL

## 2018-01-24 PROCEDURE — 43239 EGD BIOPSY SINGLE/MULTIPLE: CPT | Performed by: INTERNAL MEDICINE

## 2018-01-24 PROCEDURE — 88342 IMHCHEM/IMCYTCHM 1ST ANTB: CPT | Mod: 26 | Performed by: INTERNAL MEDICINE

## 2018-01-24 PROCEDURE — 88342 IMHCHEM/IMCYTCHM 1ST ANTB: CPT | Performed by: INTERNAL MEDICINE

## 2018-01-24 PROCEDURE — 25000128 H RX IP 250 OP 636: Performed by: INTERNAL MEDICINE

## 2018-01-24 PROCEDURE — 40000296 ZZH STATISTIC ENDO RECOVERY CLASS 1:2 FIRST HOUR: Performed by: INTERNAL MEDICINE

## 2018-01-24 PROCEDURE — 43249 ESOPH EGD DILATION <30 MM: CPT | Performed by: INTERNAL MEDICINE

## 2018-01-24 PROCEDURE — 88305 TISSUE EXAM BY PATHOLOGIST: CPT | Mod: 26 | Performed by: INTERNAL MEDICINE

## 2018-01-24 PROCEDURE — G0105 COLORECTAL SCRN; HI RISK IND: HCPCS | Performed by: INTERNAL MEDICINE

## 2018-01-24 PROCEDURE — 43245 EGD DILATE STRICTURE: CPT | Performed by: INTERNAL MEDICINE

## 2018-01-24 PROCEDURE — 88305 TISSUE EXAM BY PATHOLOGIST: CPT | Performed by: INTERNAL MEDICINE

## 2018-01-24 PROCEDURE — G0500 MOD SEDAT ENDO SERVICE >5YRS: HCPCS

## 2018-01-24 PROCEDURE — 25000132 ZZH RX MED GY IP 250 OP 250 PS 637: Mod: GY | Performed by: INTERNAL MEDICINE

## 2018-01-24 PROCEDURE — 45378 DIAGNOSTIC COLONOSCOPY: CPT | Performed by: INTERNAL MEDICINE

## 2018-01-24 PROCEDURE — 99153 MOD SED SAME PHYS/QHP EA: CPT

## 2018-01-24 PROCEDURE — 25000125 ZZHC RX 250: Performed by: INTERNAL MEDICINE

## 2018-01-24 RX ORDER — MEPERIDINE HYDROCHLORIDE 25 MG/ML
12.5 INJECTION INTRAMUSCULAR; INTRAVENOUS; SUBCUTANEOUS
Status: CANCELLED | OUTPATIENT
Start: 2018-01-24

## 2018-01-24 RX ORDER — FENTANYL CITRATE 50 UG/ML
INJECTION, SOLUTION INTRAMUSCULAR; INTRAVENOUS PRN
Status: DISCONTINUED | OUTPATIENT
Start: 2018-01-24 | End: 2018-01-24 | Stop reason: HOSPADM

## 2018-01-24 RX ORDER — SIMETHICONE
LIQUID (ML) MISCELLANEOUS PRN
Status: DISCONTINUED | OUTPATIENT
Start: 2018-01-24 | End: 2018-01-24 | Stop reason: HOSPADM

## 2018-01-24 RX ORDER — ONDANSETRON 4 MG/1
4 TABLET, ORALLY DISINTEGRATING ORAL EVERY 30 MIN PRN
Status: CANCELLED | OUTPATIENT
Start: 2018-01-24

## 2018-01-24 RX ORDER — LIDOCAINE 40 MG/G
CREAM TOPICAL
Status: DISCONTINUED | OUTPATIENT
Start: 2018-01-24 | End: 2018-01-24 | Stop reason: HOSPADM

## 2018-01-24 RX ORDER — ONDANSETRON 2 MG/ML
4 INJECTION INTRAMUSCULAR; INTRAVENOUS EVERY 30 MIN PRN
Status: CANCELLED | OUTPATIENT
Start: 2018-01-24

## 2018-01-24 RX ORDER — ONDANSETRON 2 MG/ML
4 INJECTION INTRAMUSCULAR; INTRAVENOUS
Status: DISCONTINUED | OUTPATIENT
Start: 2018-01-24 | End: 2018-01-24 | Stop reason: HOSPADM

## 2018-01-24 RX ORDER — NALOXONE HYDROCHLORIDE 0.4 MG/ML
.1-.4 INJECTION, SOLUTION INTRAMUSCULAR; INTRAVENOUS; SUBCUTANEOUS
Status: CANCELLED | OUTPATIENT
Start: 2018-01-24 | End: 2018-01-25

## 2018-01-24 RX ORDER — SODIUM CHLORIDE, SODIUM LACTATE, POTASSIUM CHLORIDE, CALCIUM CHLORIDE 600; 310; 30; 20 MG/100ML; MG/100ML; MG/100ML; MG/100ML
INJECTION, SOLUTION INTRAVENOUS CONTINUOUS
Status: CANCELLED | OUTPATIENT
Start: 2018-01-24

## 2018-01-24 RX ADMIN — MIDAZOLAM 1 MG: 1 INJECTION INTRAMUSCULAR; INTRAVENOUS at 09:35

## 2018-01-24 RX ADMIN — MIDAZOLAM 1 MG: 1 INJECTION INTRAMUSCULAR; INTRAVENOUS at 09:48

## 2018-01-24 RX ADMIN — LIDOCAINE HYDROCHLORIDE 5 ML: 20 SOLUTION ORAL; TOPICAL at 09:34

## 2018-01-24 RX ADMIN — FENTANYL CITRATE 50 MCG: 50 INJECTION, SOLUTION INTRAMUSCULAR; INTRAVENOUS at 09:48

## 2018-01-24 RX ADMIN — Medication 0.2 ML: at 09:39

## 2018-01-24 RX ADMIN — MIDAZOLAM 1 MG: 1 INJECTION INTRAMUSCULAR; INTRAVENOUS at 09:36

## 2018-01-24 NOTE — TELEPHONE ENCOUNTER
----- Message from Kenrick Wheeler PA-C sent at 1/24/2018 10:28 AM CST -----  No worrisome findings on colonoscopy or endoscopy.    Kenrick Wheeler PA-C     The patient is a 52 year old male presents to ED complaining of L ankle pain for 3 days. No trauma, No fever.  The patient noticed the swelling and pain 3 days ago and got better yesterday and but now got worse. No CP, No SOB, No ABD Pain, No motor No Sensory loss

## 2018-01-24 NOTE — CONSULTS
[facility] GI Pre-Procedure Physical Assessment    Jerry Rodriguez MRN# [id number]   Age: 82 year old YOB: 1935      Date of Surgery: 1/24/2018  Location [facility]      Date of Exam 1/24/2018 Facility (Same day)         Primary care provider: Roberta Beaver         Active problem list:   Patient Active Problem List   Diagnosis     Other atopic dermatitis and related conditions     Chronic rhinitis     Generalized osteoarthrosis, unspecified site     Primary localized osteoarthrosis of shoulder region     Stricture and stenosis of esophagus     Anxiety state     HTN (hypertension)     Tobacco use disorder     Hyponatremia     Degenerative arthritis of finger     Hammer toe     Corn     NOEMI positive     Bullous dermatitis     HTN, goal below 140/90     Vertigo     Adenomatous polyp of colon     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  No     Current Facility-Administered Medications   Medication     lidocaine 1 % 1 mL     lidocaine (LMX4) kit     sodium chloride (PF) 0.9% PF flush 3 mL     sodium chloride (PF) 0.9% PF flush 3 mL     ondansetron (ZOFRAN) injection 4 mg             Allergies:      Allergies   Allergen Reactions     No Known Drug Allergies      Allergy to Latex?  No  Allergy to tape?    No          Social History:     Social History   Substance Use Topics     Smoking status: Current Every Day Smoker     Packs/day: 1.00     Years: 52.00     Types: Cigarettes     Smokeless tobacco: Current User     Types: Chew      Comment: occasionally/ 1.5tin qwk     Alcohol use No      Comment: quit 7-1980            Physical Exam:   All vitals have been reviewed  Blood pressure 124/70, temperature 97.6  F (36.4  C), temperature source Oral, resp. rate 16, SpO2 96 %.  Airway assessment:   Patient is able to  open mouth wide      Lungs:   No increased work of breathing, good air exchange, clear to auscultation bilaterally, no crackles or wheezing      Cardiovascular:   Normal apical impulse, regular rate and rhythm, normal S1 and S2, no S3 or S4, and no murmur noted           Lab / Radiology Results:   All laboratory data reviewed          Assessment:   Appropriately NPO  Chief complaint or anatomic assessment of involved area: history of stricture, family history of colon cancer         Plan:   Moderate (conscious) sedation     Patient's active problems diagnostically and therapeutically optimized for the planned procedure  Risks, benefits, alternatives to procedure explained and consent obtained  P2 (patient with mild systemic disease)  Orders and progress notes are in the chart  Discharge from Phase 1 and / or Phase 2 recovery when patient meets criteria    I have reviewed the history and physical, lab finding(s), diagnostic data, medicaitons, and the plan for sedation.  I have determined this patient to be an appropriate candidate for the planned sedation / procedure and have reassessed the patient immediately prior to sedation / procedure.    I have personally and medically directed the administration of medications used.    Don Arambula MD

## 2018-01-24 NOTE — DISCHARGE INSTRUCTIONS
[facility] Discharge Instructions     Discharge disposition:  Discharged to home       Diet:  Regular       Activity Activity as tolerated       Follow-up: with Primary Physician PRN       Additional instructions: None

## 2018-01-24 NOTE — LETTER
January 24, 2018      Jerry Rodriguez  [address]  [place] [address]        Dear Jerry,     We have tried to reach you by phone and have been unsuccessful. This letter is to inform you that Dom Wheeler reviewed your colonoscopy and endoscopy results, and he said there were no worrisome findings.    Please call the clinic if you have any further questions or concerns.    Thank you,  [facility]

## 2018-01-29 ENCOUNTER — TELEPHONE (OUTPATIENT)
Dept: FAMILY MEDICINE | Facility: OTHER | Age: 83
End: 2018-01-29

## 2018-01-29 LAB — COPATH REPORT: NORMAL

## 2018-01-29 NOTE — TELEPHONE ENCOUNTER
This is confusing for me.  The note from the GI doctor states to continue taking omeprazole BID.  And to stop any NSAIDs, and aspirin.  I wonder if he heard him wrong (or wrote it wrong). I would be suspicious of this advice and ask that he clarify with GI.  Roberta Beaver MD

## 2018-01-29 NOTE — TELEPHONE ENCOUNTER
Patient called clinic to speak with Sd. He was told by the colonoscopy specialist (Dr. Butler) that taking Omeprazole was causing too much acid in his stomach and he should stop taking it. Patient said he has felt better since he has stopped. He wants to know what AE thinks about this and if this is ok. Please call patient back to discuss.

## 2018-01-30 DIAGNOSIS — K21.9 GASTROESOPHAGEAL REFLUX DISEASE WITHOUT ESOPHAGITIS: ICD-10-CM

## 2018-02-01 NOTE — TELEPHONE ENCOUNTER
Prilosec:  Routing refill request to provider for review/approval because:  Patient reporting dose was increased to BID, but med list states daily. Please advise.     Samantha Burgess, RN, BSN

## 2018-03-01 NOTE — PROGRESS NOTES
"  SUBJECTIVE:   Jerry Rodriguez is a 83 year old male who presents to clinic today for the following health issues:    HPI     Headache  Onset: 1-2 months    Description:   Location: mostly on right side   Character: sharp pain, squeezing pain  Frequency:  Almost daily  Duration:  2-3 hours    Intensity: 6/10 at its worst    Progression of Symptoms:  intermittent    Accompanying Signs & Symptoms:  Stiff neck: no   Neck or upper back pain: no   Fever: no   Sinus pressure: YES  Nausea or vomiting: no   Dizziness: YES  Numbness: no   Weakness: no   Visual changes: YES- once in a while    History:   Head trauma: YES  Family history of migraines: no   Previous tests for headaches: no   Neurologist evaluations: no   Able to do daily activities: not while he has a headaches  Wake with a headaches: no   Do headaches wake you up: no   Daily pain medication use: no   Work/school stressors/changes: no     Precipitating factors:   Does light make it worse: YES- sometimes  Does sound make it worse: YES- sometimes    Alleviating factors:  Does sleep help: YES    Therapies Tried and outcome: None tried    His headache has been severe enough that he thinks he needs to have an MRI. He localizes his headache to the area of his scalp around the coronal suture, and feels squeezing pain, like \"something is stuck there\". His wife reports that he gets the headache when he is more stressed or busy. He feels that it is worsened by light and sound. When he lays down the headache gets better. The headaches usually last for 5-10 minutes. Sometimes he also gets dizzy, and takes meclizine and that helps him.       Problem list and histories reviewed & adjusted, as indicated.  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     COLONOSCOPY N/A 1/24/2018    Procedure: COLONOSCOPY;;  Surgeon: Don Arambula MD;  Location:  GI     ESOPHAGOSCOPY, GASTROSCOPY, DUODENOSCOPY (EGD), COMBINED  1/4/2011    COMBINED ESOPHAGOSCOPY, GASTROSCOPY, DUODENOSCOPY (EGD), ESOPHAGEAL DILATION BALLOON LESS THAN 30MM performed by ELEN BUSBY at  GI     ESOPHAGOSCOPY, GASTROSCOPY, DUODENOSCOPY (EGD), COMBINED N/A 9/25/2015    Procedure: COMBINED ESOPHAGOSCOPY, GASTROSCOPY, DUODENOSCOPY (EGD), BIOPSY SINGLE OR MULTIPLE;  Surgeon: Markell Lawson MD;  Location:  GI     ESOPHAGOSCOPY, GASTROSCOPY, DUODENOSCOPY (EGD), COMBINED N/A 1/24/2018    Procedure: COMBINED ESOPHAGOSCOPY, GASTROSCOPY, DUODENOSCOPY (EGD), BIOPSY SINGLE OR MULTIPLE;;  Surgeon: Don Arambula MD;  Location: [facility]     ESOPHAGOSCOPY, GASTROSCOPY, DUODENOSCOPY (EGD), DILATATION, COMBINED N/A 1/24/2018    Procedure: COMBINED ESOPHAGOSCOPY, GASTROSCOPY, DUODENOSCOPY (EGD), DILATATION;  Esophagogastroduodenoscopy with Dilatation and Biopsy by Biopsy, Colonoscopy;  Surgeon: Don Arambula MD;  Location:  GI     HC COLONOSCOPY THRU STOMA, DIAGNOSTIC  11/15/2000    Recurrent abdominal pain     HC COLONOSCOPY W/WO BRUSH/WASH  07/19/2004     HC REPAIR INCISIONAL HERNIA,REDUCIBLE  11/10/2000    Hernia Repair, Incisional, Unilateral, Left inguinal hernia and umbilical hernia     HC UGI  ENDOSCOPY DIAG W OR W/O BRUSH/WASH  07/19/2004      UGI ENDOSCOPY DIAG W OR W/O BRUSH/WASH  05/24/2005    Peptic stricture of esophagus, dilated to 18 mm. Erosive reflux esophagitis. Hiatal hernia. Erosive duodenitis.      UGI ENDOSCOPY, SIMPLE EXAM  02/20/07      UGI ENDOSCOPY, SIMPLE EXAM  12/02/08    Schiatzky's ring, dialated, PPI indefinitely      UGI ENDOSCOPY, SIMPLE EXAM  12/15/2009    benign stricture with dialation     LAPAROSCOPIC APPENDECTOMY  09/15/2005       Social History   Substance Use Topics     Smoking status: Current Every Day Smoker     Packs/day: 1.00     Years: 52.00     Types: Cigarettes     Smokeless tobacco: Current User     Types: Chew      Comment: occasionally/ 1.5tin qwk     Alcohol use No      Comment: quit 7-1980     Family History   Problem Relation Age of Onset     CANCER Sister      brain tumor     DIABETES Sister      Alzheimer Disease Sister            ROS:  Constitutional, HEENT, cardiovascular, pulmonary, GI, , musculoskeletal, neuro, skin, endocrine and psych systems are negative, except as in HPI or otherwise noted.     This document serves as a record of the services and decisions personally performed and made by Roberta Beaver MD. It was created on her behalf by Maggie Tristan, a trained medical scribe. The creation of this document is based the provider's statements to the medical scribe.  Maggie Tristan, March 8, 2018 11:37 AM       OBJECTIVE:                                                    /58  Pulse 75  Temp 96.8  F (36  C) (Temporal)  Wt 198 lb (89.8 kg)  SpO2 97%  BMI 28.41 kg/m2  Body mass index is 28.41 kg/(m^2).   GENERAL: healthy, alert, well nourished, well hydrated, no distress  HENT: ear canals- normal; TMs- normal; Nose- normal; Mouth- no ulcers, no lesions  NECK: no tenderness, no adenopathy, no asymmetry, no masses, no stiffness; thyroid- normal to palpation;  MS: tension in trapezius muscle of neck, good ROM, sensation and strength of neck and  facial muscles.  SKIN: age related skin lesions and discoloration noted  PSYCH: Alert and oriented times 3; speech- coherent , normal rate and volume; able to articulate logical thoughts, able to abstract reason, no tangential thoughts, no hallucinations or delusions, affect- normal    Diagnostic test results:  No results found for this or any previous visit (from the past 24 hour(s)).     ASSESSMENT/PLAN:                                                        ICD-10-CM    1. Acute nonintractable headache, unspecified headache type R51 MR Brain w/o & w Contrast   2. Hyponatremia E87.1 **Basic metabolic panel FUTURE 1yr   3. Vertigo R42         Offered counseling to help him work through some of his stresses. Order placed for patient to have an MRI. Will need to get a BMP before the MRI as well to check liver/kidney functions.       Patient instructions discussed with patient    The information in this document, created by the medical scribe for me, accurately reflects the services I personally performed and the decisions made by me. I have reviewed and approved this document for accuracy.   MD Roberta Burciaga MD, MD  [facility]

## 2018-03-08 ENCOUNTER — OFFICE VISIT (OUTPATIENT)
Dept: FAMILY MEDICINE | Facility: OTHER | Age: 83
End: 2018-03-08
Payer: COMMERCIAL

## 2018-03-08 VITALS
TEMPERATURE: 96.8 F | HEART RATE: 75 BPM | WEIGHT: 198 LBS | BODY MASS INDEX: 28.41 KG/M2 | DIASTOLIC BLOOD PRESSURE: 58 MMHG | OXYGEN SATURATION: 97 % | SYSTOLIC BLOOD PRESSURE: 108 MMHG

## 2018-03-08 DIAGNOSIS — R42 VERTIGO: ICD-10-CM

## 2018-03-08 DIAGNOSIS — R51.9 ACUTE NONINTRACTABLE HEADACHE, UNSPECIFIED HEADACHE TYPE: Primary | ICD-10-CM

## 2018-03-08 DIAGNOSIS — E87.1 HYPONATREMIA: ICD-10-CM

## 2018-03-08 LAB
ANION GAP SERPL CALCULATED.3IONS-SCNC: 10 MMOL/L (ref 3–14)
BUN SERPL-MCNC: 6 MG/DL (ref 7–30)
CALCIUM SERPL-MCNC: 8.5 MG/DL (ref 8.5–10.1)
CHLORIDE SERPL-SCNC: 96 MMOL/L (ref 94–109)
CO2 SERPL-SCNC: 25 MMOL/L (ref 20–32)
CREAT SERPL-MCNC: 0.93 MG/DL (ref 0.66–1.25)
GFR SERPL CREATININE-BSD FRML MDRD: 77 ML/MIN/1.7M2
GLUCOSE SERPL-MCNC: 92 MG/DL (ref 70–99)
POTASSIUM SERPL-SCNC: 4.5 MMOL/L (ref 3.4–5.3)
SODIUM SERPL-SCNC: 131 MMOL/L (ref 133–144)

## 2018-03-08 PROCEDURE — 99214 OFFICE O/P EST MOD 30 MIN: CPT | Performed by: FAMILY MEDICINE

## 2018-03-08 PROCEDURE — 80048 BASIC METABOLIC PNL TOTAL CA: CPT | Performed by: FAMILY MEDICINE

## 2018-03-08 PROCEDURE — 36415 COLL VENOUS BLD VENIPUNCTURE: CPT | Performed by: FAMILY MEDICINE

## 2018-03-08 NOTE — MR AVS SNAPSHOT
After Visit Summary   3/8/2018    Jerry Rodriguez    MRN: [id number]           Patient Information     Date Of Birth          1935        Visit Information        Provider Department      3/8/2018 11:00 AM Roberta Beaver MD [facility]        Today's Diagnoses     Acute nonintractable headache, unspecified headache type    -  1    Hyponatremia           Follow-ups after your visit        Follow-up notes from your care team     Return if symptoms worsen or fail to improve.      Your next 10 appointments already scheduled     Mar 09, 2018  1:15 PM CST   (Arrive by 1:00 PM)   MR BRAIN W/O & W CONTRAST with PHMR1   [facility] ([facility])    [address] [place] [id number] [phone]           Take your medicines as usual, unless your doctor tells you not to. Bring a list of your current medicines to your exam (including vitamins, minerals and over-the-counter drugs).  You may or may not receive intravenous (IV) contrast for this exam pending the discretion of the Radiologist.  You do not need to do anything special to prepare.  The MRI machine uses a strong magnet. Please wear clothes without metal (snaps, zippers). A sweatsuit works well, or we may give you a hospital gown.  Please remove any body piercings and hair extensions before you arrive. You will also remove watches, jewelry, hairpins, wallets, dentures, partial dental plates and hearing aids. You may wear contact lenses, and you may be able to wear your rings. We have a safe place to keep your personal items, but it is safer to leave them at home.  **IMPORTANT** THE INSTRUCTIONS BELOW ARE ONLY FOR THOSE PATIENTS WHO HAVE BEEN PRESCRIBED SEDATION OR GENERAL ANESTHESIA DURING THEIR MRI PROCEDURE:  IF YOUR DOCTOR PRESCRIBED ORAL SEDATION (take medicine to help you relax during your exam):   You must get the medicine from your doctor (oral medication) before you arrive. Bring the  medicine to the exam. Do not take it at home. You ll be told when to take it upon arriving for your exam.   Arrive one hour early. Bring someone who can take you home after the test. Your medicine will make you sleepy. After the exam, you may not drive, take a bus or take a taxi by yourself.  IF YOUR DOCTOR PRESCRIBED IV SEDATION:   Arrive one hour early. Bring someone who can take you home after the test. Your medicine will make you sleepy. After the exam, you may not drive, take a bus or take a taxi by yourself.   No eating 6 hours before your exam. You may have clear liquids up until 4 hours before your exam. (Clear liquids include water, clear tea, black coffee and fruit juice without pulp.)  IF YOUR DOCTOR PRESCRIBED ANESTHESIA (be asleep for your exam):   Arrive 1 1/2 hours early. Bring someone who can take you home after the test. You may not drive, take a bus or take a taxi by yourself.   No eating 8 hours before your exam. You may have clear liquids up until 4 hours before your exam. (Clear liquids include water, clear tea, black coffee and fruit juice without pulp.)   You will spend four to five hours in the recovery room.  Please call the Imaging Department at your exam site with any questions.              Future tests that were ordered for you today     Open Future Orders        Priority Expected Expires Ordered    MR Brain w/o & w Contrast Routine  3/8/2019 3/8/2018            Who to contact     If you have questions or need follow up information about today's clinic visit or your schedule please contact [facility] directly at [phone].  Normal or non-critical lab and imaging results will be communicated to you by MyChart, letter or phone within 4 business days after the clinic has received the results. If you do not hear from us within 7 days, please contact the clinic through MyChart or phone. If you have a critical or abnormal lab result, we will notify you by phone as soon as  "possible.  Submit refill requests through [employer] or call your pharmacy and they will forward the refill request to us. Please allow 3 business days for your refill to be completed.          Additional Information About Your Visit        [employer] Information     [employer] lets you send messages to your doctor, view your test results, renew your prescriptions, schedule appointments and more. To sign up, go to [email or web address] . Click on \"Log in\" on the left side of the screen, which will take you to the Welcome page. Then click on \"Sign up Now\" on the right side of the page.     You will be asked to enter the access code listed below, as well as some personal information. Please follow the directions to create your username and password.     Your access code is: [email or web address]  Expires: 2018  3:43 PM     Your access code will  in 90 days. If you need help or a new code, please call your [facility] clinic or [phone].        Care EveryWhere ID     This is your Care EveryWhere ID. This could be used by other organizations to access your [facility] medical records  [id number]        Your Vitals Were     Pulse Temperature Pulse Oximetry BMI (Body Mass Index)          75 96.8  F (36  C) (Temporal) 97% 28.41 kg/m2         Blood Pressure from Last 3 Encounters:   18 108/58   18 119/72   18 110/50    Weight from Last 3 Encounters:   18 198 lb (89.8 kg)   18 203 lb (92.1 kg)   18 202 lb (91.6 kg)              We Performed the Following     **Basic metabolic panel FUTURE 1yr        Primary Care Provider Office Phone # Fax #    Roberta Beaver [phone]       [address] [facility] [address]        Equal Access to Services     [facility] JOHN : Hodan Easley, walo martinez, qaybta kaalmadiana lynn, kenyatta vallecillo. [facility] [phone].    ATENCIÓN: Si habla español, tiene a billingsley disposición servicios gratuitos de " asistencia lingüística. Dayanara al [phone].    We comply with applicable federal civil rights laws and [place] laws. We do not discriminate on the basis of race, color, national origin, age, disability, sex, sexual orientation, or gender identity.            Thank you!     Thank you for choosing [facility]  for your care. Our goal is always to provide you with excellent care. Hearing back from our patients is one way we can continue to improve our services. Please take a few minutes to complete the written survey that you may receive in the mail after your visit with us. Thank you!             Your Updated Medication List - Protect others around you: Learn how to safely use, store and throw away your medicines at www.disposemymeds.org.          This list is accurate as of 3/8/18 11:58 AM.  Always use your most recent med list.                   Brand Name Dispense Instructions for use Diagnosis    ADVIL PO      Take 400 mg by mouth        albuterol 108 (90 BASE) MCG/ACT Inhaler    VENTOLIN HFA    18 g    INHALE TWO PUFFS BY MOUTH EVERY SIX HOURS AS NEEDED FOR SHORTNESS OF BREATH OR WHEEZING.    Cough       amLODIPine 5 MG tablet    NORVASC    90 tablet    Take 1 tablet (5 mg) by mouth daily    Essential hypertension       aspirin 81 MG tablet      ONE DAILY        busPIRone 5 MG tablet    BUSPAR    180 tablet    TAKE ONE TABLET BY MOUTH TWICE DAILY    Anxiety state       cetirizine 10 MG tablet    zyrTEC    30 tablet    Take 1 tablet (10 mg) by mouth every evening    Seasonal allergic rhinitis, unspecified allergic rhinitis trigger       FLOVENT DISKUS IN           fluticasone 50 MCG/ACT spray    FLONASE    1 Bottle    Spray 1-2 sprays into both nostrils daily    Chronic rhinitis, unspecified type       Fluticasone-Salmeterol 232-14 MCG/ACT Aepb inhaler    AIRDUO RESPICLICK 232/14    1 each    Inhale 1 Device into the lungs 2 times daily    Simple chronic bronchitis (H)       meclizine 25 MG  tablet    ANTIVERT    30 tablet    Take 1 tablet (25 mg) by mouth 3 times daily as needed for dizziness    Vertigo       Multi-vitamin Tabs tablet      Take 1 tablet by mouth daily        omeprazole 20 MG CR capsule    priLOSEC    180 capsule    Take 1 capsule (20 mg) by mouth 2 times daily    Gastroesophageal reflux disease without esophagitis       timolol 0.5 % ophthalmic solution    TIMOPTIC     Apply 1 drop to eye 2 times daily        timolol hemihydrate 0.25 % Soln ophthalmic solution    BETIMOL     Reported on 3/29/2017        VALVED HOLDING CHAMBER Sydney     1 each    1 Device 2 times daily To be used with flovent    Simple chronic bronchitis (H)

## 2018-03-09 ENCOUNTER — HOSPITAL ENCOUNTER (OUTPATIENT)
Dept: MRI IMAGING | Facility: CLINIC | Age: 83
Discharge: HOME | End: 2018-03-09
Attending: FAMILY MEDICINE | Admitting: FAMILY MEDICINE
Payer: MEDICARE

## 2018-03-09 DIAGNOSIS — R51.9 ACUTE NONINTRACTABLE HEADACHE, UNSPECIFIED HEADACHE TYPE: ICD-10-CM

## 2018-03-09 PROCEDURE — 70553 MRI BRAIN STEM W/O & W/DYE: CPT

## 2018-03-09 PROCEDURE — 25000128 H RX IP 250 OP 636: Performed by: RADIOLOGY

## 2018-03-09 PROCEDURE — A9585 GADOBUTROL INJECTION: HCPCS | Performed by: RADIOLOGY

## 2018-03-09 RX ORDER — GADOBUTROL 604.72 MG/ML
10 INJECTION INTRAVENOUS ONCE
Status: COMPLETED | OUTPATIENT
Start: 2018-03-09 | End: 2018-03-09

## 2018-03-09 RX ADMIN — GADOBUTROL 9 ML: 604.72 INJECTION INTRAVENOUS at 13:32

## 2018-03-09 NOTE — PROGRESS NOTES
Still a little hyponatremic - seeing a good amount of fluid so appears a little low on sodium. But stable, no changes.  Ready for MRI next.  Roberta Beaver MD

## 2018-03-30 ENCOUNTER — HOSPITAL ENCOUNTER (EMERGENCY)
Facility: CLINIC | Age: 83
Discharge: HOME | End: 2018-03-30
Attending: FAMILY MEDICINE | Admitting: FAMILY MEDICINE
Payer: MEDICARE

## 2018-03-30 ENCOUNTER — APPOINTMENT (OUTPATIENT)
Dept: GENERAL RADIOLOGY | Facility: CLINIC | Age: 83
End: 2018-03-30
Attending: FAMILY MEDICINE
Payer: MEDICARE

## 2018-03-30 VITALS
BODY MASS INDEX: 28.51 KG/M2 | TEMPERATURE: 96.2 F | OXYGEN SATURATION: 96 % | WEIGHT: 198.7 LBS | SYSTOLIC BLOOD PRESSURE: 141 MMHG | DIASTOLIC BLOOD PRESSURE: 86 MMHG

## 2018-03-30 DIAGNOSIS — S80.01XA CONTUSION OF RIGHT KNEE, INITIAL ENCOUNTER: ICD-10-CM
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
## 2018-03-30 DIAGNOSIS — S70.01XA CONTUSION, HIP AND THIGH, RIGHT, INITIAL ENCOUNTER: ICD-10-CM

## 2018-03-30 DIAGNOSIS — S70.11XA CONTUSION, HIP AND THIGH, RIGHT, INITIAL ENCOUNTER: ICD-10-CM

## 2018-03-30 PROCEDURE — 99285 EMERGENCY DEPT VISIT HI MDM: CPT | Mod: 25 | Performed by: FAMILY MEDICINE

## 2018-03-30 PROCEDURE — 72170 X-RAY EXAM OF PELVIS: CPT | Mod: TC

## 2018-03-30 PROCEDURE — 73552 X-RAY EXAM OF FEMUR 2/>: CPT | Mod: TC,RT

## 2018-03-30 PROCEDURE — 73562 X-RAY EXAM OF KNEE 3: CPT | Mod: TC,RT

## 2018-03-30 PROCEDURE — 99284 EMERGENCY DEPT VISIT MOD MDM: CPT | Mod: Z6 | Performed by: FAMILY MEDICINE

## 2018-03-30 NOTE — ED AVS SNAPSHOT
[facility]    [address] [place] [address]    Phone:  [phone]    Fax:  [phone]                                       Jerry Rodriguez   MRN: [id number]    Department:  [facility]   Date of Visit:  3/30/2018           After Visit Summary Signature Page     I have received my discharge instructions, and my questions have been answered. I have discussed any challenges I see with this plan with the nurse or doctor.    ..........................................................................................................................................  Patient/Patient Representative Signature      ..........................................................................................................................................  Patient Representative Print Name and Relationship to Patient    ..................................................               ................................................  Date                                            Time    ..........................................................................................................................................  Reviewed by Signature/Title    ...................................................              ..............................................  Date                                                            Time

## 2018-03-30 NOTE — ED AVS SNAPSHOT
[facility] Emergency Department    [address]     [place] [address]    Phone:  [phone]    Fax:  [phone]                                       Jerry Rodriguez   MRN: [id number]    Department:  [facility] Emergency Department   Date of Visit:  3/30/2018           Patient Information     Date Of Birth          1935        Your diagnoses for this visit were:     Fall from slipping on ice, initial encounter     Contusion, hip and thigh, right, initial encounter     Contusion of right knee, initial encounter        You were seen by Pola Craven DO.      Follow-up Information     Follow up with Roberta Beaver MD.    Specialty:  Family Practice    Why:  if not improved in 7 days    Contact information:    [address] [facility] [address]  [phone]          Follow up with [facility] Emergency Department.    Specialty:  EMERGENCY MEDICINE    Why:  If symptoms worsen    Contact information:    [address]   [place] [address] [phone]    Additional information:    From [address]: Exit at [address] on [place] side of [place]. Turn right on [address]. Turn left at stoplight on [address]. [facility] will be in view two blocks ahead        Discharge Instructions       Please read and follow the handout(s) instructions. Return, if needed, for increased or worsening symptoms and as directed by the handout(s).    You may apply ice or cold packs to the area for 10-15 minutes every 1-2 hours as needed to relieve pain and/or swelling.    I encourage you to do some walking each day even if it is sore so that you do not develop any clots in your leg.    I would expect you to be improved in the next 14-21 days.    Good to see you and Estefania. I hope you feel better soon!      Electronically signed, Pola Craven DO      Discharge References/Attachments     HIP CONTUSION (ENGLISH)    LOWER EXTREMITY CONTUSION (ENGLISH)     BONE BRUISE (BONE CONTUSION), UNDERSTANDING (ENGLISH)      24 Hour Appointment Hotline       To make an appointment at any [facility], call [phone] ([phone]). If you don't have a family doctor or clinic, we will help you find one. [facility] clinics are conveniently located to serve the needs of you and your family.             Review of your medicines      Our records show that you are taking the medicines listed below. If these are incorrect, please call your family doctor or clinic.        Dose / Directions Last dose taken    ADVIL PO   Dose:  400 mg        Take 400 mg by mouth   Refills:  0        albuterol 108 (90 BASE) MCG/ACT Inhaler   Commonly known as:  VENTOLIN HFA   Quantity:  18 g        INHALE TWO PUFFS BY MOUTH EVERY SIX HOURS AS NEEDED FOR SHORTNESS OF BREATH OR WHEEZING.   Refills:  3        amLODIPine 5 MG tablet   Commonly known as:  NORVASC   Dose:  5 mg   Quantity:  90 tablet        Take 1 tablet (5 mg) by mouth daily   Refills:  3        aspirin 81 MG tablet        ONE DAILY   Refills:  0        busPIRone 5 MG tablet   Commonly known as:  BUSPAR   Quantity:  180 tablet        TAKE ONE TABLET BY MOUTH TWICE DAILY   Refills:  3        cetirizine 10 MG tablet   Commonly known as:  zyrTEC   Dose:  10 mg   Quantity:  30 tablet        Take 1 tablet (10 mg) by mouth every evening   Refills:  9        FLOVENT DISKUS IN        Refills:  0        fluticasone 50 MCG/ACT spray   Commonly known as:  FLONASE   Dose:  1-2 spray   Quantity:  1 Bottle        Spray 1-2 sprays into both nostrils daily   Refills:  11        Fluticasone-Salmeterol 232-14 MCG/ACT Aepb inhaler   Commonly known as:  AIRDUO RESPICLICK 232/14   Dose:  1 Device   Quantity:  1 each        Inhale 1 Device into the lungs 2 times daily   Refills:  3        meclizine 25 MG tablet   Commonly known as:  ANTIVERT   Dose:  25 mg   Quantity:  30 tablet        Take 1 tablet (25 mg) by mouth 3 times daily as needed for dizziness    Refills:  12        Multi-vitamin Tabs tablet   Dose:  1 tablet        Take 1 tablet by mouth daily   Refills:  0        omeprazole 20 MG CR capsule   Commonly known as:  priLOSEC   Dose:  20 mg   Quantity:  180 capsule        Take 1 capsule (20 mg) by mouth 2 times daily   Refills:  2        timolol 0.5 % ophthalmic solution   Commonly known as:  TIMOPTIC   Dose:  1 drop        Apply 1 drop to eye 2 times daily   Refills:  0        timolol hemihydrate 0.25 % Soln ophthalmic solution   Commonly known as:  BETIMOL        Reported on 3/29/2017   Refills:  0        VALVED HOLDING CHAMBER Sydney   Dose:  1 Device   Quantity:  1 each        1 Device 2 times daily To be used with flovent   Refills:  1                Procedures and tests performed during your visit     Femur XR, 2 views, right    Knee XR, 3 views, right    Pelvis XR, 1-2 views      Orders Needing Specimen Collection     None      Pending Results     No orders found from 3/28/2018 to 3/31/2018.            Pending Culture Results     No orders found from 3/28/2018 to 3/31/2018.            Pending Results Instructions     If you had any lab results that were not finalized at the time of your Discharge, you can call the ED Lab Result RN at [phone]. You will be contacted by this team for any positive Lab results or changes in treatment. The nurses are available 7 days a week from 10A to 6:30P.  You can leave a message 24 hours per day and they will return your call.        Thank you for choosing [facility]       Thank you for choosing [facility] for your care. Our goal is always to provide you with excellent care. Hearing back from our patients is one way we can continue to improve our services. Please take a few minutes to complete the written survey that you may receive in the mail after you visit with us. Thank you!        [facility] Information     [employer] lets you send messages to your doctor, view your test results, renew your prescriptions, schedule  "appointments and more. To sign up, go to [email or web address] . Click on \"Log in\" on the left side of the screen, which will take you to the Welcome page. Then click on \"Sign up Now\" on the right side of the page.     You will be asked to enter the access code listed below, as well as some personal information. Please follow the directions to create your username and password.     Your access code is: [email or web address]  Expires: 2018  4:43 PM     Your access code will  in 90 days. If you need help or a new code, please call your [facility] clinic or [phone].        Care EveryWhere ID     This is your Care EveryWhere ID. This could be used by other organizations to access your [facility] medical records  [id number]        Equal Access to Services     [facility] : Hodan Easley, johanna martinez, yao lynn, kenyatta nobles . [facility] [phone].    ATENCIÓN: Si habla español, tiene a billingsley disposición servicios gratuitos de asistencia lingüística. Llame al [phone].    We comply with applicable federal civil rights laws and [place] laws. We do not discriminate on the basis of race, color, national origin, age, disability, sex, sexual orientation, or gender identity.            After Visit Summary       This is your record. Keep this with you and show to your community pharmacist(s) and doctor(s) at your next visit.                  "

## 2018-03-31 NOTE — ED NOTES
Ambulated to Bathroom and back. Patient walked with a cane and was steady on feet. Stated felt stiff.

## 2018-03-31 NOTE — ED PROVIDER NOTES
"  History     Chief Complaint   Patient presents with     Fall     The history is provided by the patient.     Jerry Rodriguez is a 83 year old male who presents to the emergency [facility] with concerns of a fall. Patient was coming out of the garage walking on snow covered grass when he slipped on a patch of \"lumpy\" ice. Patient reports that he landed on the right side and buttocks. He has been able to walk, but it hurts. Normal urination without issues.       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  Moderate at L4/5.  MRI 7/2014       Degeneration of lumbar intervertebral disc 08/01/2014     Priority: Medium     HTN, goal below 140/90 03/31/2014     Priority: Medium     Vertigo 03/31/2014     Priority: Medium     Adenomatous polyp of colon 03/31/2014     Priority: Medium     Bullous dermatitis 08/13/2013     Priority: Medium     NOEMI positive 02/20/2013     Priority: Medium     Hammer toe 02/06/2013     Priority: Medium     Corn 02/06/2013     Priority: Medium     Degenerative arthritis of finger 05/23/2011     Priority: Medium     (Problem list name updated by automated process. Provider to review and confirm.)       Hyponatremia 01/13/2011     Priority: Medium     Tobacco use disorder 02/13/2009     Priority: Medium     HTN (hypertension) 01/28/2009     Priority: Medium     Anxiety state      Priority: Medium     Problem list name updated by automated process. Provider to review       Stricture and stenosis of esophagus 08/02/2004     Priority: Medium     Primary localized osteoarthrosis of shoulder region 01/07/2004     Priority: Medium     Problem list name updated by automated process. Provider to review       Chronic rhinitis 04/05/2002     Priority: Medium     Generalized osteoarthrosis, unspecified site 04/05/2002     " Priority: Medium     Other atopic dermatitis and related conditions      Priority: Medium        Past Medical History:    Past Medical History:   Diagnosis Date     Anxiety state, unspecified      Arthritis      Hypertension      Hyponatremia      Inguinal hernia with obstruction, without mention of gangrene, unilateral or unspecified, (not specified as recurrent)      Inguinal hernia without mention of obstruction or gangrene, unilateral or unspecified, (not specified as recurrent) 11/07/2000     Other joint derangement, not elsewhere classified, forearm 1980     Pathologic fracture of neck of femur (H)      Stricture and stenosis of esophagus 2004       Past Surgical History:    Past Surgical History:   Procedure Laterality Date     C OSTEOTOMY HIP/FEMUR  1980    traumatic fracture right hip     COLONOSCOPY  10/17/2007    Repeat Colonoscopy in 5 years for surveillance.     COLONOSCOPY N/A 1/24/2018    Procedure: COLONOSCOPY;;  Surgeon: Don Arambula MD;  Location:  GI     ESOPHAGOSCOPY, GASTROSCOPY, DUODENOSCOPY (EGD), COMBINED  1/4/2011    COMBINED ESOPHAGOSCOPY, GASTROSCOPY, DUODENOSCOPY (EGD), ESOPHAGEAL DILATION BALLOON LESS THAN 30MM performed by ELEN BUSBY at  GI     ESOPHAGOSCOPY, GASTROSCOPY, DUODENOSCOPY (EGD), COMBINED N/A 9/25/2015    Procedure: COMBINED ESOPHAGOSCOPY, GASTROSCOPY, DUODENOSCOPY (EGD), BIOPSY SINGLE OR MULTIPLE;  Surgeon: Markell Lawson MD;  Location:  GI     ESOPHAGOSCOPY, GASTROSCOPY, DUODENOSCOPY (EGD), COMBINED N/A 1/24/2018    Procedure: COMBINED ESOPHAGOSCOPY, GASTROSCOPY, DUODENOSCOPY (EGD), BIOPSY SINGLE OR MULTIPLE;;  Surgeon: Don Arambula MD;  Location: [facility]     ESOPHAGOSCOPY, GASTROSCOPY, DUODENOSCOPY (EGD), DILATATION, COMBINED N/A 1/24/2018    Procedure: COMBINED ESOPHAGOSCOPY, GASTROSCOPY, DUODENOSCOPY (EGD), DILATATION;  Esophagogastroduodenoscopy with Dilatation and Biopsy by Biopsy, Colonoscopy;  Surgeon: Don Arambula MD;  Location:  [facility]     HC COLONOSCOPY THRU STOMA, DIAGNOSTIC  11/15/2000    Recurrent abdominal pain     HC COLONOSCOPY W/WO BRUSH/WASH  07/19/2004     HC REPAIR INCISIONAL HERNIA,REDUCIBLE  11/10/2000    Hernia Repair, Incisional, Unilateral, Left inguinal hernia and umbilical hernia     HC UGI ENDOSCOPY DIAG W OR W/O BRUSH/WASH  07/19/2004     HC UGI ENDOSCOPY DIAG W OR W/O BRUSH/WASH  05/24/2005    Peptic stricture of esophagus, dilated to 18 mm. Erosive reflux esophagitis. Hiatal hernia. Erosive duodenitis.     HC UGI ENDOSCOPY, SIMPLE EXAM  02/20/07     HC UGI ENDOSCOPY, SIMPLE EXAM  12/02/08    Schiatzky's ring, dialated, PPI indefinitely     HC UGI ENDOSCOPY, SIMPLE EXAM  12/15/2009    benign stricture with dialation     LAPAROSCOPIC APPENDECTOMY  09/15/2005       Family History:    Family History   Problem Relation Age of Onset     CANCER Sister      brain tumor     DIABETES Sister      Alzheimer Disease Sister        Social History:  Marital Status:   [2]  Social History   Substance Use Topics     Smoking status: Current Every Day Smoker     Packs/day: 1.00     Years: 52.00     Types: Cigarettes     Smokeless tobacco: Current User     Types: Chew      Comment: occasionally/ 1.5tin qwk     Alcohol use No      Comment: quit 7-1980        Medications:      omeprazole (PRILOSEC) 20 MG CR capsule   fluticasone (FLONASE) 50 MCG/ACT spray   Fluticasone-Salmeterol (AIRDUO RESPICLICK 232/14) 232-14 MCG/ACT AEPB   meclizine (ANTIVERT) 25 MG tablet   busPIRone (BUSPAR) 5 MG tablet   albuterol (VENTOLIN HFA) 108 (90 BASE) MCG/ACT Inhaler   cetirizine (ZYRTEC) 10 MG tablet   amLODIPine (NORVASC) 5 MG tablet   timolol (TIMOPTIC) 0.5 % ophthalmic solution   multivitamin, therapeutic with minerals (MULTI-VITAMIN) TABS   ASPIRIN 81 MG OR TABS   Fluticasone Propionate, Inhal, (FLOVENT DISKUS IN)   Spacer/Aero-Holding Chambers (VALVED HOLDING CHAMBER) JULISSA   Ibuprofen (ADVIL PO)   TIMOLOL 0.25 % OP SOLN         Review of Systems    Genitourinary: Negative.    Musculoskeletal:        Most of his pain is on the right side in the back, buttocks, and leg.   All other systems reviewed and are negative.      Physical Exam   BP: 141/86  Heart Rate: 93  Temp: 96.2  F (35.7  C)  Weight: 90.1 kg (198 lb 11.2 oz)  SpO2: 96 %      Physical Exam   Constitutional: He is oriented to person, place, and time. He appears well-developed and well-nourished. He appears distressed (mild).   HENT:   Head: Atraumatic.   Eyes: Conjunctivae and EOM are normal. Pupils are equal, round, and reactive to light.   Neck: Normal range of motion. Neck supple.   Cardiovascular: Normal rate.    Pulmonary/Chest: Effort normal. No respiratory distress.   Abdominal: Soft. There is no tenderness.   Musculoskeletal:        Right hip: He exhibits tenderness, bony tenderness and swelling. He exhibits normal range of motion, normal strength, no crepitus, no deformity and no laceration.        Right knee: He exhibits normal range of motion, no swelling, no effusion, no ecchymosis, no deformity, no erythema, normal alignment and no LCL laxity. Tenderness (Lateral aspect of the knee.) found.        Legs:  Neurological: He is alert and oriented to person, place, and time.   Nursing note and vitals reviewed.      ED Course     ED Course     Procedures               Critical Care time:  none               Results for orders placed or performed during the hospital encounter of 03/30/18 (from the past 24 hour(s))   Pelvis XR, 1-2 views    Narrative    XR PELVIS 1/2 VW   3/30/2018 8:24 PM     HISTORY: Fall on ice right hip pain;     COMPARISON: None.      Impression    IMPRESSION: Patient is status post previous internal fixation of a  right hip fracture. A compression nail is in place. Degenerative  changes are present in both hip joints. No fractures are identified.    ALEX EVANGELISTA MD   Knee XR, 3 views, right    Narrative    KNEE THREE VIEWS RIGHT  3/30/2018 8:24 PM     HISTORY: fall on ice,  pain;     COMPARISON: None.    FINDINGS: There is normal osseous alignment.  No fractures are  identified.  There is narrowing of the medial joint compartment.  Degenerative changes seen in the patellofemoral compartment. Vascular  calcifications.      Impression    IMPRESSION: No fractures are identified.    ALEX EVANGELISTA MD   Femur XR, 2 views, right    Narrative    FEMUR TWO VIEWS RIGHT  3/30/2018 8:25 PM     HISTORY: fall on ice, right leg pain;     COMPARISON: None.    FINDINGS: A compression nail is seen in the right hip. Degenerative  changes are present in the right hip joint. No fractures are  identified. Vascular calcifications..      Impression    IMPRESSION: No fractures are identified on these views.    ALEX EVANGELISTA MD       Medications - No data to display    Assessments & Plan (with Medical Decision Making)  83-year-old to the year secondary concerns of right hip and knee pain after a slip and fall on the ice earlier today.  Patient states he slipped outside his home at approximately 1:00 this afternoon landing on the right side of his buttock and hip area.  He has been able to ambulate but has had increasing pain to the area since.  Exam findings with evidence for some bruising to the skin of the right hip area but no obvious deformity.  There was tenderness with palpation to the hip.  X-ray examination unremarkable for evidence of acute fracture or dislocation to the right hip, pelvis, or knee.  Patient instructed in the use of ice therapy and gentle walking and range of motion activity to improve his pain symptoms.  Handouts on lower extremity contusions were given to the patient to review as well.  Patient refuses offer of pain medication.     I have reviewed the nursing notes.    I have reviewed the findings, diagnosis, plan and need for follow up with the patient.       Discharge Medication List as of 3/30/2018  9:07 PM          Final diagnoses:   Fall from slipping on ice, initial encounter    Contusion, hip and thigh, right, initial encounter   Contusion of right knee, initial encounter     This document serves as a record of services personally performed by Pola Craven MD. It was created on their behalf by Gema Colmenares, a trained medical scribe. The creation of this record is based on the provider's personal observations and the statements of the patient. This document has been checked and approved by the attending provider.  Note: Chart documentation done in part with Dragon Voice Recognition software. Although reviewed after completion, some word and grammatical errors may remain.  3/30/2018   [facility] EMERGENCY DEPARTMENT     Pola Craven, DO  03/31/18 0618

## 2018-03-31 NOTE — DISCHARGE INSTRUCTIONS
Please read and follow the handout(s) instructions. Return, if needed, for increased or worsening symptoms and as directed by the handout(s).    You may apply ice or cold packs to the area for 10-15 minutes every 1-2 hours as needed to relieve pain and/or swelling.    I encourage you to do some walking each day even if it is sore so that you do not develop any clots in your leg.    I would expect you to be improved in the next 14-21 days.    Good to see you and Estefania. I hope you feel better soon!      Electronically signed, Pola Craven DO

## 2018-03-31 NOTE — ED NOTES
Slipped on the ice around 1245, landed on his right side. States having right hip and right knee pain.

## 2018-04-12 ENCOUNTER — TELEPHONE (OUTPATIENT)
Dept: FAMILY MEDICINE | Facility: OTHER | Age: 83
End: 2018-04-12

## 2018-04-12 NOTE — TELEPHONE ENCOUNTER
Next 5 appointments (look out 90 days)     Apr 18, 2018  3:00 PM CDT   Office Visit with Roberta Beaver MD   [facility]    [address]  [place] [address]   [phone]                Samantha Burgess RN, BSN

## 2018-04-12 NOTE — TELEPHONE ENCOUNTER
Reason for Call:  Same Day Appointment, Requested Provider:  Roberta Beaver MD     PCP: Roberta Beaver    Reason for visit: fell on ice, took xrays of his right side ([facility]- Emergency Room)  and they were all clear per his wife Estefania - could you see him for follow up this next week one of the days you are in?     Duration of symptoms: one day     Have you been treated for this in the past? No    Additional comments: none    Can we leave a detailed message on this number? YES    Phone number patient can be reached at: Home number on file [phone] (home)    Best Time: any    Call taken on 4/12/2018 at 4:14 PM by Gita Shi

## 2018-04-13 NOTE — PROGRESS NOTES
SUBJECTIVE:   Jerry Rodriguez is a 83 year old male who presents to clinic today for the following health issues:    HPI     ED/UC Followup:    Facility:  [facility]  Date of visit: 3/30/18  Reason for visit: Fell on ice, injured right hip and leg  Current Status: Still having right leg pain.     He fell and slipped on some ice while taking the trash out on 3/30. He landed on the right leg. He is still having some swelling in the right leg and some pain.       Problem list and histories reviewed & adjusted, as indicated.  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     COLONOSCOPY N/A 1/24/2018    Procedure: COLONOSCOPY;;  Surgeon: Don Arambula MD;  Location:  GI     ESOPHAGOSCOPY, GASTROSCOPY, DUODENOSCOPY (EGD), COMBINED  1/4/2011    COMBINED ESOPHAGOSCOPY, GASTROSCOPY, DUODENOSCOPY (EGD), ESOPHAGEAL DILATION BALLOON LESS THAN 30MM performed by ELEN BUSBY at  GI     ESOPHAGOSCOPY, GASTROSCOPY, DUODENOSCOPY (EGD), COMBINED N/A 9/25/2015    Procedure: COMBINED ESOPHAGOSCOPY, GASTROSCOPY, DUODENOSCOPY (EGD), BIOPSY SINGLE OR MULTIPLE;  Surgeon: Markell Lawson MD;  Location:  [facility]     ESOPHAGOSCOPY, GASTROSCOPY, DUODENOSCOPY (EGD), COMBINED N/A 1/24/2018    Procedure: COMBINED ESOPHAGOSCOPY, GASTROSCOPY, DUODENOSCOPY (EGD), BIOPSY SINGLE OR MULTIPLE;;  Surgeon: Don Arambula MD;  Location: [facility]     ESOPHAGOSCOPY, GASTROSCOPY, DUODENOSCOPY (EGD), DILATATION, COMBINED N/A 1/24/2018    Procedure: COMBINED ESOPHAGOSCOPY, GASTROSCOPY, DUODENOSCOPY (EGD), DILATATION;  Esophagogastroduodenoscopy with Dilatation and Biopsy by Biopsy, Colonoscopy;  Surgeon: Don Arambula MD;  Location: [facility]     HC COLONOSCOPY THRU STOMA, DIAGNOSTIC  11/15/2000    Recurrent abdominal pain     HC COLONOSCOPY W/WO BRUSH/WASH  07/19/2004     HC REPAIR INCISIONAL HERNIA,REDUCIBLE  11/10/2000    Hernia Repair, Incisional, Unilateral, Left inguinal hernia and umbilical hernia     HC UGI ENDOSCOPY DIAG W OR W/O BRUSH/WASH  07/19/2004     HC UGI ENDOSCOPY DIAG W OR W/O BRUSH/WASH  05/24/2005    Peptic stricture of esophagus, dilated to 18 mm. Erosive reflux esophagitis. Hiatal hernia. Erosive duodenitis.     HC UGI ENDOSCOPY, SIMPLE EXAM  02/20/07     HC UGI ENDOSCOPY, SIMPLE EXAM  12/02/08    Schiatzky's ring, dialated, PPI indefinitely     HC UGI ENDOSCOPY, SIMPLE EXAM  12/15/2009    benign stricture with dialation     LAPAROSCOPIC APPENDECTOMY  09/15/2005       Social History   Substance Use Topics     Smoking status: Current Every Day Smoker     Packs/day: 1.00     Years: 52.00     Types: Cigarettes     Smokeless tobacco: Current User     Types: Chew      Comment: occasionally/ 1.5tin qwk     Alcohol use No      Comment: quit 7-1980     Family History   Problem Relation Age of Onset     CANCER Sister      brain tumor     DIABETES Sister      Alzheimer Disease Sister            ROS:  Constitutional, HEENT, cardiovascular, pulmonary, GI, , musculoskeletal, neuro, skin, endocrine and psych systems are negative, except as in HPI or otherwise noted.     This document serves as a record of the services  and decisions personally performed and made by Roberta Beaver MD. It was created on her behalf by Maggie Tristan, a trained medical scribe. The creation of this document is based the provider's statements to the medical scribe.  Maggie Tristan, May 2, 2018 1:12 PM     OBJECTIVE:                                                    /58  Pulse 82  Temp 97.5  F (36.4  C) (Temporal)  Wt 198 lb (89.8 kg)  SpO2 99%  BMI 28.41 kg/m2  Body mass index is 28.41 kg/(m^2).   GENERAL: healthy, alert, well nourished, well hydrated, no distress  SKIN: no suspicious lesions, no rashes to visible skin  PSYCH: Alert and oriented times 3; speech- coherent , normal rate and volume; able to articulate logical thoughts, able to abstract reason, no tangential thoughts, no hallucinations or delusions, affect- normal.  MS- Lower Extremities: He wears a compression knee brace on the left knee to keep it stable, right knee is slightly more swollen than the left, age appropriate strength and normal ROM.     Left Knee Exam   Left knee exam is normal.    Right Knee Exam   Swelling: Mild  Effusion: No    Tenderness   None    Range of Motion   Normal right knee ROM  Extension: Normal  Flexion:     90    Muscle Strength   Normal right knee strength    Tests   Lachman:  Anterior - Negative    Posterior - Negative  Drawer:       Anterior - Negative    Posterior - Negative  Varus:  Negative  Valgus: Negative    Comments:  Right knee appears more swollen than left knee, popliteal cyst is present in the posterior of the knee      Diagnostic test results:  No results found for this or any previous visit (from the past 24 hour(s)).     ASSESSMENT/PLAN:                                                        ICD-10-CM    1. Popliteal cyst, right M71.21    2. Fall, subsequent encounter W19.XXXD      ER Follow-up: Knee is healing well, recommended using a wrap or ace bandage to speed up the reabsorption of fluid from the popliteal cyst. Reviewed his MRI results  from 3/9 with him as well and while he had some evidence of ischemic disease, there was no significant change.    Patient Instructions   Can use a wrap or ace bandage on the right knee to help the swelling go down faster.       The information in this document, created by the medical scribe for me, accurately reflects the services I personally performed and the decisions made by me. I have reviewed and approved this document for accuracy.     Roberta Beaver MD, MD  [facility]

## 2018-05-02 ENCOUNTER — OFFICE VISIT (OUTPATIENT)
Dept: FAMILY MEDICINE | Facility: OTHER | Age: 83
End: 2018-05-02
Payer: COMMERCIAL

## 2018-05-02 VITALS
SYSTOLIC BLOOD PRESSURE: 108 MMHG | WEIGHT: 198 LBS | DIASTOLIC BLOOD PRESSURE: 58 MMHG | BODY MASS INDEX: 28.41 KG/M2 | OXYGEN SATURATION: 99 % | HEART RATE: 82 BPM | TEMPERATURE: 97.5 F
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
## 2018-05-02 DIAGNOSIS — M71.21 POPLITEAL CYST, RIGHT: Primary | ICD-10-CM

## 2018-05-02 PROCEDURE — 99214 OFFICE O/P EST MOD 30 MIN: CPT | Performed by: FAMILY MEDICINE

## 2018-05-02 ASSESSMENT — PAIN SCALES - GENERAL: PAINLEVEL: MODERATE PAIN (5)

## 2018-05-02 NOTE — MR AVS SNAPSHOT
"              After Visit Summary   2018    Jerry Rodriguez    MRN: [id number]           Patient Information     Date Of Birth          1935        Visit Information        Provider Department      2018 1:15 PM Roberta Beaver MD [facility]        Today's Diagnoses     Popliteal cyst, right    -  1    Fall, subsequent encounter          Care Instructions    Can use a wrap or ace bandage on the right knee to help the swelling go down faster.           Follow-ups after your visit        Who to contact     If you have questions or need follow up information about today's clinic visit or your schedule please contact [facility] directly at [phone].  Normal or non-critical lab and imaging results will be communicated to you by MyChart, letter or phone within 4 business days after the clinic has received the results. If you do not hear from us within 7 days, please contact the clinic through MyChart or phone. If you have a critical or abnormal lab result, we will notify you by phone as soon as possible.  Submit refill requests through [employer] or call your pharmacy and they will forward the refill request to us. Please allow 3 business days for your refill to be completed.          Additional Information About Your Visit        MyChart Information     [employer] lets you send messages to your doctor, view your test results, renew your prescriptions, schedule appointments and more. To sign up, go to [email or web address] . Click on \"Log in\" on the left side of the screen, which will take you to the Welcome page. Then click on \"Sign up Now\" on the right side of the page.     You will be asked to enter the access code listed below, as well as some personal information. Please follow the directions to create your username and password.     Your access code is: [email or web address]  Expires: 2018  5:35 PM     Your access code will  in 90 days. If you need help or a new " code, please call your [facility] clinic or [phone].        Care EveryWhere ID     This is your Care EveryWhere ID. This could be used by other organizations to access your [facility] medical records  [id number]        Your Vitals Were     Pulse Temperature Pulse Oximetry BMI (Body Mass Index)          82 97.5  F (36.4  C) (Temporal) 99% 28.41 kg/m2         Blood Pressure from Last 3 Encounters:   05/02/18 108/58   03/30/18 141/86   03/08/18 108/58    Weight from Last 3 Encounters:   05/02/18 198 lb (89.8 kg)   03/30/18 198 lb 11.2 oz (90.1 kg)   03/08/18 198 lb (89.8 kg)              Today, you had the following     No orders found for display       Primary Care Provider Office Phone # Fax #    Roberta Beaver [phone]       [address] [facility] [address]        Equal Access to Services     [facility] : Hadii tashia samson hadasho Soomaali, waaxda luqadaha, qaybta kaalmada adeegyada, kenyatta nobles . So [facility] [phone].    ATENCIÓN: Si habla español, tiene a billingsley disposición servicios gratuitos de asistencia lingüística. Llame al [phone].    We comply with applicable federal civil rights laws and [place] laws. We do not discriminate on the basis of race, color, national origin, age, disability, sex, sexual orientation, or gender identity.            Thank you!     Thank you for choosing [facility]  for your care. Our goal is always to provide you with excellent care. Hearing back from our patients is one way we can continue to improve our services. Please take a few minutes to complete the written survey that you may receive in the mail after your visit with us. Thank you!             Your Updated Medication List - Protect others around you: Learn how to safely use, store and throw away your medicines at www.disposemymeds.org.          This list is accurate as of 5/2/18  5:35 PM.  Always use your most recent med list.                   Brand  Name Dispense Instructions for use Diagnosis    ADVIL PO      Take 400 mg by mouth        albuterol 108 (90 Base) MCG/ACT Inhaler    VENTOLIN HFA    18 g    INHALE TWO PUFFS BY MOUTH EVERY SIX HOURS AS NEEDED FOR SHORTNESS OF BREATH OR WHEEZING.    Cough       amLODIPine 5 MG tablet    NORVASC    90 tablet    Take 1 tablet (5 mg) by mouth daily    Essential hypertension       aspirin 81 MG tablet      ONE DAILY        busPIRone 5 MG tablet    BUSPAR    180 tablet    TAKE ONE TABLET BY MOUTH TWICE DAILY    Anxiety state       cetirizine 10 MG tablet    zyrTEC    30 tablet    Take 1 tablet (10 mg) by mouth every evening    Seasonal allergic rhinitis, unspecified allergic rhinitis trigger       FLOVENT DISKUS IN           fluticasone 50 MCG/ACT spray    FLONASE    1 Bottle    Spray 1-2 sprays into both nostrils daily    Chronic rhinitis, unspecified type       Fluticasone-Salmeterol 232-14 MCG/ACT Aepb inhaler    AIRDUO RESPICLICK 232/14    1 each    Inhale 1 Device into the lungs 2 times daily    Simple chronic bronchitis (H)       meclizine 25 MG tablet    ANTIVERT    30 tablet    Take 1 tablet (25 mg) by mouth 3 times daily as needed for dizziness    Vertigo       Multi-vitamin Tabs tablet      Take 1 tablet by mouth daily        omeprazole 20 MG CR capsule    priLOSEC    180 capsule    Take 1 capsule (20 mg) by mouth 2 times daily    Gastroesophageal reflux disease without esophagitis       timolol 0.5 % ophthalmic solution    TIMOPTIC     Apply 1 drop to eye 2 times daily        timolol hemihydrate 0.25 % Soln ophthalmic solution    BETIMOL     Reported on 3/29/2017        VALVED HOLDING CHAMBER [place]     1 each    1 Device 2 times daily To be used with flovent    Simple chronic bronchitis (H)

## 2018-05-26 DIAGNOSIS — I10 ESSENTIAL HYPERTENSION: ICD-10-CM

## 2018-05-29 RX ORDER — AMLODIPINE BESYLATE 5 MG/1
TABLET ORAL
Qty: 90 TABLET | Refills: 3 | Status: SHIPPED | OUTPATIENT
Start: 2018-05-29 | End: 2019-05-18

## 2018-05-29 NOTE — TELEPHONE ENCOUNTER
"Requested Prescriptions   Pending Prescriptions Disp Refills     amLODIPine (NORVASC) 5 MG tablet [Pharmacy Med Name: AmLODIPine Besylate Oral Tablet 5 MG] 90 tablet 2     Sig: TAKE ONE TABLET BY MOUTH ONE TIME DAILY    Calcium Channel Blockers Protocol  Passed    5/26/2018 12:49 PM       Passed - Blood pressure under 140/90 in past 12 months    BP Readings from Last 3 Encounters:   05/02/18 108/58   03/30/18 141/86   03/08/18 108/58                Passed - Recent (12 mo) or future (30 days) visit within the authorizing provider's specialty    Patient had office visit in the last 12 months or has a visit in the next 30 days with authorizing provider or within the authorizing provider's specialty.  See \"Patient Info\" tab in inbasket, or \"Choose Columns\" in Meds & Orders section of the refill encounter.           Passed - Patient is age 18 or older       Passed - Normal serum creatinine on file in past 12 months    Recent Labs   Lab Test  03/08/18   1152   CR  0.93             Last ov 05/02/2018  Prescription approved per [facility] Refill Protocol.  Omar Bennett, RN, BSN        "

## 2018-06-26 ENCOUNTER — HOSPITAL ENCOUNTER (EMERGENCY)
Facility: CLINIC | Age: 83
Discharge: HOME | End: 2018-06-26
Attending: EMERGENCY MEDICINE | Admitting: EMERGENCY MEDICINE
Payer: MEDICARE

## 2018-06-26 ENCOUNTER — APPOINTMENT (OUTPATIENT)
Dept: MRI IMAGING | Facility: CLINIC | Age: 83
End: 2018-06-26
Attending: EMERGENCY MEDICINE
Payer: MEDICARE

## 2018-06-26 VITALS
DIASTOLIC BLOOD PRESSURE: 76 MMHG | SYSTOLIC BLOOD PRESSURE: 135 MMHG | TEMPERATURE: 98.2 F | RESPIRATION RATE: 14 BRPM | OXYGEN SATURATION: 97 %

## 2018-06-26 DIAGNOSIS — R42 VERTIGO: ICD-10-CM

## 2018-06-26 LAB
ANION GAP SERPL CALCULATED.3IONS-SCNC: 8 MMOL/L (ref 3–14)
BASOPHILS # BLD AUTO: 0 10E9/L (ref 0–0.2)
BASOPHILS NFR BLD AUTO: 0.5 %
BUN SERPL-MCNC: 7 MG/DL (ref 7–30)
CALCIUM SERPL-MCNC: 8.9 MG/DL (ref 8.5–10.1)
CHLORIDE SERPL-SCNC: 92 MMOL/L (ref 94–109)
CO2 SERPL-SCNC: 28 MMOL/L (ref 20–32)
CREAT SERPL-MCNC: 0.9 MG/DL (ref 0.66–1.25)
DIFFERENTIAL METHOD BLD: NORMAL
EOSINOPHIL NFR BLD AUTO: 5.2 %
ERYTHROCYTE [DISTWIDTH] IN BLOOD BY AUTOMATED COUNT: 12.2 % (ref 10–15)
GFR SERPL CREATININE-BSD FRML MDRD: 81 ML/MIN/1.7M2
GLUCOSE SERPL-MCNC: 90 MG/DL (ref 70–99)
HCT VFR BLD AUTO: 42.4 % (ref 40–53)
HGB BLD-MCNC: 14.6 G/DL (ref 13.3–17.7)
IMM GRANULOCYTES # BLD: 0 10E9/L (ref 0–0.4)
IMM GRANULOCYTES NFR BLD: 0.4 %
LYMPHOCYTES # BLD AUTO: 1.5 10E9/L (ref 0.8–5.3)
LYMPHOCYTES NFR BLD AUTO: 19.3 %
MCH RBC QN AUTO: 29.6 PG (ref 26.5–33)
MCHC RBC AUTO-ENTMCNC: 34.4 G/DL (ref 31.5–36.5)
MCV RBC AUTO: 86 FL (ref 78–100)
MONOCYTES # BLD AUTO: 0.9 10E9/L (ref 0–1.3)
MONOCYTES NFR BLD AUTO: 11.7 %
NEUTROPHILS # BLD AUTO: 5 10E9/L (ref 1.6–8.3)
NEUTROPHILS NFR BLD AUTO: 62.9 %
NRBC # BLD AUTO: 0 10*3/UL
NRBC BLD AUTO-RTO: 0 /100
PLATELET # BLD AUTO: 231 10E9/L (ref 150–450)
POTASSIUM SERPL-SCNC: 4.3 MMOL/L (ref 3.4–5.3)
RBC # BLD AUTO: 4.94 10E12/L (ref 4.4–5.9)
SODIUM SERPL-SCNC: 128 MMOL/L (ref 133–144)
WBC # BLD AUTO: 7.9 10E9/L (ref 4–11)

## 2018-06-26 PROCEDURE — 99284 EMERGENCY DEPT VISIT MOD MDM: CPT | Mod: Z6 | Performed by: EMERGENCY MEDICINE

## 2018-06-26 PROCEDURE — 85025 COMPLETE CBC W/AUTO DIFF WBC: CPT | Performed by: EMERGENCY MEDICINE

## 2018-06-26 PROCEDURE — 80048 BASIC METABOLIC PNL TOTAL CA: CPT | Performed by: EMERGENCY MEDICINE

## 2018-06-26 PROCEDURE — 25000128 H RX IP 250 OP 636: Performed by: RADIOLOGY

## 2018-06-26 PROCEDURE — 99285 EMERGENCY DEPT VISIT HI MDM: CPT | Mod: 25 | Performed by: EMERGENCY MEDICINE

## 2018-06-26 PROCEDURE — 70553 MRI BRAIN STEM W/O & W/DYE: CPT

## 2018-06-26 PROCEDURE — A9585 GADOBUTROL INJECTION: HCPCS | Performed by: RADIOLOGY

## 2018-06-26 RX ORDER — GADOBUTROL 604.72 MG/ML
10 INJECTION INTRAVENOUS ONCE
Status: COMPLETED | OUTPATIENT
Start: 2018-06-26 | End: 2018-06-26

## 2018-06-26 RX ADMIN — GADOBUTROL 9 ML: 604.72 INJECTION INTRAVENOUS at 15:35
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
## 2018-06-26 NOTE — ED AVS SNAPSHOT
[facility] Emergency Department    [address] [place] [address]    Phone:  [phone]    Fax:  [phone]                                       Jerry Rodriguez   MRN: [id number]    Department:  [facility] Emergency Department   Date of Visit:  6/26/2018           After Visit Summary Signature Page     I have received my discharge instructions, and my questions have been answered. I have discussed any challenges I see with this plan with the nurse or doctor.    ..........................................................................................................................................  Patient/Patient Representative Signature      ..........................................................................................................................................  Patient Representative Print Name and Relationship to Patient    ..................................................               ................................................  Date                                            Time    ..........................................................................................................................................  Reviewed by Signature/Title    ...................................................              ..............................................  Date                                                            Time

## 2018-06-26 NOTE — DISCHARGE INSTRUCTIONS
Dizziness (Uncertain Cause)  Dizziness is a common symptom. It may be described as lightheadedness, spinning, or feeling like you are going to faint. Dizziness can have many causes.  Be sure to tell the healthcare provider about:    All medicines you take, including prescription, over-the-counter, herbs, and supplements    Any other symptoms you have    Any health problems you are being treated for    Any past major health problems you've had, such as a heart attack, balance issues, hearing problems, or blood pressure problems    Anything that causes the dizziness to get worse or better  Today's exam did not show an exact cause for your dizziness. Other tests may be needed. Follow up with your healthcare provider.  Home care    Dizziness that occurs with sudden standing may be a sign of mild dehydration. Drink extra fluids for the next few days.    If you recently started a new medicine, stopped a medicine, or had the dose of a current medicine changed, talk with the prescribing healthcare provider. Your medicine plan may need adjustment.    If dizziness lasts more than a few seconds, sit or lie down until it passes. This may help prevent injury in case you pass out. Get up slowly when you feel better.    Don't drive or use power tools or dangerous equipment until you have had no dizziness for at least 48 hours.  Follow-up care  Follow up with your healthcare provider for further evaluation within the next 7 days or as advised.  When to seek medical advice  Call your healthcare provider for any of the following:    Worsening of symptoms or new symptoms    Passing out or seizure    Repeated vomiting    Headache    Palpitations (the sense that your heart is fluttering or beating fast or hard)    Shortness of breath    Blood in vomit or stool (black or red color)    Weakness of an arm or leg or 1 side of the face    Vision or hearing changes    Trouble walking or speaking    Chest, arm, neck, back, or jaw pain  Date  Last Reviewed: 11/1/2017 2000-2017 [employer]. [address] [place] [address], [place] [address]. All rights reserved. This information is not intended as a substitute for professional medical care. Always follow your healthcare professional's instructions.

## 2018-06-26 NOTE — ED AVS SNAPSHOT
[facility] Emergency Department    [address]     [place] [address]    Phone:  [phone]    Fax:  [phone]                                       Jerry Rodriguez   MRN: [id number]    Department:  [facility] Emergency Department   Date of Visit:  6/26/2018           Patient Information     Date Of Birth          1935        Your diagnoses for this visit were:     Vertigo        You were seen by Javier Kendrick MD.      Follow-up Information     Schedule an appointment as soon as possible for a visit with Roberta Beaver MD.    Specialty:  Family Practice    Why:  ER follow up    Contact information:    [address] [facility] [address]  [phone]          Discharge Instructions         Dizziness (Uncertain Cause)  Dizziness is a common symptom. It may be described as lightheadedness, spinning, or feeling like you are going to faint. Dizziness can have many causes.  Be sure to tell the healthcare provider about:    All medicines you take, including prescription, over-the-counter, herbs, and supplements    Any other symptoms you have    Any health problems you are being treated for    Any past major health problems you've had, such as a heart attack, balance issues, hearing problems, or blood pressure problems    Anything that causes the dizziness to get worse or better  Today's exam did not show an exact cause for your dizziness. Other tests may be needed. Follow up with your healthcare provider.  Home care    Dizziness that occurs with sudden standing may be a sign of mild dehydration. Drink extra fluids for the next few days.    If you recently started a new medicine, stopped a medicine, or had the dose of a current medicine changed, talk with the prescribing healthcare provider. Your medicine plan may need adjustment.    If dizziness lasts more than a few seconds, sit or lie down until it passes. This may help prevent injury in case you pass out. Get up slowly  when you feel better.    Don't drive or use power tools or dangerous equipment until you have had no dizziness for at least 48 hours.  Follow-up care  Follow up with your healthcare provider for further evaluation within the next 7 days or as advised.  When to seek medical advice  Call your healthcare provider for any of the following:    Worsening of symptoms or new symptoms    Passing out or seizure    Repeated vomiting    Headache    Palpitations (the sense that your heart is fluttering or beating fast or hard)    Shortness of breath    Blood in vomit or stool (black or red color)    Weakness of an arm or leg or 1 side of the face    Vision or hearing changes    Trouble walking or speaking    Chest, arm, neck, back, or jaw pain  Date Last Reviewed: 11/1/2017 2000-2017 [employer]. [address] [place] [address], [place] [address]. All rights reserved. This information is not intended as a substitute for professional medical care. Always follow your healthcare professional's instructions.          24 Hour Appointment Hotline       To make an appointment at any [facility], call [phone] ([phone]). If you don't have a family doctor or clinic, we will help you find one. [facility] clinics are conveniently located to serve the needs of you and your family.             Review of your medicines      Our records show that you are taking the medicines listed below. If these are incorrect, please call your family doctor or clinic.        Dose / Directions Last dose taken    ADVIL PO   Dose:  400 mg        Take 400 mg by mouth   Refills:  0        albuterol 108 (90 Base) MCG/ACT Inhaler   Commonly known as:  VENTOLIN HFA   Quantity:  18 g        INHALE TWO PUFFS BY MOUTH EVERY SIX HOURS AS NEEDED FOR SHORTNESS OF BREATH OR WHEEZING.   Refills:  3        amLODIPine 5 MG tablet   Commonly known as:  NORVASC   Quantity:  90 tablet        TAKE ONE TABLET BY MOUTH ONE TIME DAILY   Refills:  3         aspirin 81 MG tablet        ONE DAILY   Refills:  0        busPIRone 5 MG tablet   Commonly known as:  BUSPAR   Quantity:  180 tablet        TAKE ONE TABLET BY MOUTH TWICE DAILY   Refills:  3        cetirizine 10 MG tablet   Commonly known as:  zyrTEC   Dose:  10 mg   Quantity:  30 tablet        Take 1 tablet (10 mg) by mouth every evening   Refills:  9        FLOVENT DISKUS IN        Refills:  0        fluticasone 50 MCG/ACT spray   Commonly known as:  FLONASE   Dose:  1-2 spray   Quantity:  1 Bottle        Spray 1-2 sprays into both nostrils daily   Refills:  11        Fluticasone-Salmeterol 232-14 MCG/ACT Aepb inhaler   Commonly known as:  AIRDUO RESPICLICK 232/14   Dose:  1 Device   Quantity:  1 each        Inhale 1 Device into the lungs 2 times daily   Refills:  3        meclizine 25 MG tablet   Commonly known as:  ANTIVERT   Dose:  25 mg   Quantity:  30 tablet        Take 1 tablet (25 mg) by mouth 3 times daily as needed for dizziness   Refills:  12        Multi-vitamin Tabs tablet   Dose:  1 tablet        Take 1 tablet by mouth daily   Refills:  0        omeprazole 20 MG CR capsule   Commonly known as:  priLOSEC   Dose:  20 mg   Quantity:  180 capsule        Take 1 capsule (20 mg) by mouth 2 times daily   Refills:  2        timolol 0.5 % ophthalmic solution   Commonly known as:  TIMOPTIC   Dose:  1 drop        Apply 1 drop to eye 2 times daily   Refills:  0        timolol hemihydrate 0.25 % Soln ophthalmic solution   Commonly known as:  BETIMOL        Reported on 3/29/2017   Refills:  0        VALVED HOLDING CHAMBER [place]   Dose:  1 Device   Quantity:  1 each        1 Device 2 times daily To be used with flovent   Refills:  1                Procedures and tests performed during your visit     Basic metabolic panel    CBC with platelets differential    MR Brain w/o & w Contrast    Orthostatic blood pressure and pulse      Orders Needing Specimen Collection     None      Pending Results     No orders found from  "2018 to 2018.            Pending Culture Results     No orders found from 2018 to 2018.            Pending Results Instructions     If you had any lab results that were not finalized at the time of your Discharge, you can call the ED Lab Result RN at [phone]. You will be contacted by this team for any positive Lab results or changes in treatment. The nurses are available 7 days a week from 10A to 6:30P.  You can leave a message 24 hours per day and they will return your call.        Thank you for choosing [facility]       Thank you for choosing [facility] for your care. Our goal is always to provide you with excellent care. Hearing back from our patients is one way we can continue to improve our services. Please take a few minutes to complete the written survey that you may receive in the mail after you visit with us. Thank you!        [employer] Information     [employer] lets you send messages to your doctor, view your test results, renew your prescriptions, schedule appointments and more. To sign up, go to [email or web address] . Click on \"Log in\" on the left side of the screen, which will take you to the Welcome page. Then click on \"Sign up Now\" on the right side of the page.     You will be asked to enter the access code listed below, as well as some personal information. Please follow the directions to create your username and password.     Your access code is: [email or web address]  Expires: 2018  5:35 PM     Your access code will  in 90 days. If you need help or a new code, please call your [facility] clinic or [phone].        Care EveryWhere ID     This is your Care EveryWhere ID. This could be used by other organizations to access your [facility] medical records  [id number]        Equal Access to Services     TAYLER LOPEZ : Hodan Easley, johanna martinez, kenyatta rod. [facility] [phone].    ATENCIÓN: Si habla " español, tiene a billingsley disposición servicios gratuitos de asistencia lingüística. Dayanara al [phone].    We comply with applicable federal civil rights laws and [place] laws. We do not discriminate on the basis of race, color, national origin, age, disability, sex, sexual orientation, or gender identity.            After Visit Summary       This is your record. Keep this with you and show to your community pharmacist(s) and doctor(s) at your next visit.

## 2018-06-27 ENCOUNTER — TELEPHONE (OUTPATIENT)
Dept: FAMILY MEDICINE | Facility: OTHER | Age: 83
End: 2018-06-27

## 2018-06-27 NOTE — TELEPHONE ENCOUNTER
Contacted Estefania to discuss ear washes (C2C on file), advised she would need an appointment with PCP to discuss as suggested in the [facility] policy. Patient agrees and states she will talk it over with her  and make an appointment. Patient has no other concerns and will call back to make an appointment if needed.   Magy Duff RN, BSN

## 2018-06-27 NOTE — TELEPHONE ENCOUNTER
Reason for Call: Request for an order or referral:    Order or referral being requested: ear flush asap    Date needed: as soon as possible    Has the patient been seen by the PCP for this problem? YES    Additional comments: none    Phone number Patient can be reached at:  Home number on file [phone] (home)    Best Time:  any    Can we leave a detailed message on this number?  YES    Call taken on 6/27/2018 at 3:12 PM by Gita Shi

## 2018-06-27 NOTE — TELEPHONE ENCOUNTER
Patient was seen in the ER yesterday for vertigo - so likely needs an appointment to follow up with provider (and ear lavage could be addressed at that appointment). But will flag for RN as I know they do hospital follow up phone calls.  Chely Stokes, CMA

## 2018-07-12 ENCOUNTER — TELEPHONE (OUTPATIENT)
Dept: FAMILY MEDICINE | Facility: OTHER | Age: 83
End: 2018-07-12

## 2018-07-12 NOTE — TELEPHONE ENCOUNTER
RN spoke with patient. Patient would like to follow up with a provider since ED visit and sign OSCAR for records and copy of imaging. Scheduled. Mary Hook RN, BSN

## 2018-07-12 NOTE — TELEPHONE ENCOUNTER
Reason for Call:  Request for results:    Name of test or procedure: MRI    Date of test of procedure: 6/2018    Location of the test or procedure: [facility], was in the ER June/2018  Never got results, needs paper copy and films and have someone explain this to him and what he should do to follow up. I told him appointment is normally made, but he declined PFA.  Insurance told him that he should be calling us to get results and demand it.  OK to leave the result message on voice mail or with a family member? YES    Phone number Patient can be reached at:  Home number on file [phone] (home)    Additional comments: any    Call taken on 7/12/2018 at 2:21 PM by Gita Shi

## 2018-07-13 NOTE — PROGRESS NOTES
SUBJECTIVE:   Jerry Rodriguez is a 83 year old male who presents to clinic today for the following health issues:      HPI     ED/UC Followup:    Facility:  [facility]  Date of visit: 6/26/18  Reason for visit: Vertigo  Current Status: Still getting dizzy daily- Taking Meclizine once & awhile, doesn't like them       Problem list and histories reviewed & adjusted, as indicated.  Additional history: as documented        Patient Active Problem List   Diagnosis     Other atopic dermatitis and related conditions     Chronic rhinitis     Generalized osteoarthrosis, unspecified site     Primary localized osteoarthrosis of shoulder region     Stricture and stenosis of esophagus     Anxiety state     HTN (hypertension)     Tobacco use disorder     Hyponatremia     Degenerative arthritis of finger     Hammer toe     Corn     NOEMI positive     Bullous dermatitis     HTN, goal below 140/90     Vertigo     Adenomatous polyp of colon     Lumbosacral neuritis - moderate at L4/5     Degeneration of lumbar intervertebral disc     Simple chronic bronchitis (H)     Past Surgical History:   Procedure Laterality Date     C OSTEOTOMY HIP/FEMUR  1980    traumatic fracture right hip     COLONOSCOPY  10/17/2007    Repeat Colonoscopy in 5 years for surveillance.     COLONOSCOPY N/A 1/24/2018    Procedure: COLONOSCOPY;;  Surgeon: Don Arambula MD;  Location: [facility]     ESOPHAGOSCOPY, GASTROSCOPY, DUODENOSCOPY (EGD), COMBINED  1/4/2011    COMBINED ESOPHAGOSCOPY, GASTROSCOPY, DUODENOSCOPY (EGD), ESOPHAGEAL DILATION BALLOON LESS THAN 30MM performed by ELEN BUSBY at  GI     ESOPHAGOSCOPY, GASTROSCOPY, DUODENOSCOPY (EGD), COMBINED N/A 9/25/2015    Procedure: COMBINED ESOPHAGOSCOPY, GASTROSCOPY, DUODENOSCOPY (EGD), BIOPSY SINGLE OR MULTIPLE;  Surgeon: Markell Lawson MD;  Location: [facility]     ESOPHAGOSCOPY, GASTROSCOPY, DUODENOSCOPY (EGD), COMBINED N/A 1/24/2018    Procedure: COMBINED ESOPHAGOSCOPY,  GASTROSCOPY, DUODENOSCOPY (EGD), BIOPSY SINGLE OR MULTIPLE;;  Surgeon: Don Arambula MD;  Location: [facility]     ESOPHAGOSCOPY, GASTROSCOPY, DUODENOSCOPY (EGD), DILATATION, COMBINED N/A 1/24/2018    Procedure: COMBINED ESOPHAGOSCOPY, GASTROSCOPY, DUODENOSCOPY (EGD), DILATATION;  Esophagogastroduodenoscopy with Dilatation and Biopsy by Biopsy, Colonoscopy;  Surgeon: Don Arambula MD;  Location: [facility]     HC COLONOSCOPY THRU STOMA, DIAGNOSTIC  11/15/2000    Recurrent abdominal pain     HC COLONOSCOPY W/WO BRUSH/WASH  07/19/2004     HC REPAIR INCISIONAL HERNIA,REDUCIBLE  11/10/2000    Hernia Repair, Incisional, Unilateral, Left inguinal hernia and umbilical hernia     HC UGI ENDOSCOPY DIAG W OR W/O BRUSH/WASH  07/19/2004     HC UGI ENDOSCOPY DIAG W OR W/O BRUSH/WASH  05/24/2005    Peptic stricture of esophagus, dilated to 18 mm. Erosive reflux esophagitis. Hiatal hernia. Erosive duodenitis.      UGI ENDOSCOPY, SIMPLE EXAM  02/20/07      UGI ENDOSCOPY, SIMPLE EXAM  12/02/08    Schiatzky's ring, dialated, PPI indefinitely      UGI ENDOSCOPY, SIMPLE EXAM  12/15/2009    benign stricture with dialation     LAPAROSCOPIC APPENDECTOMY  09/15/2005       Social History   Substance Use Topics     Smoking status: Current Every Day Smoker     Packs/day: 1.00     Years: 52.00     Types: Cigarettes     Smokeless tobacco: Current User     Types: Chew      Comment: occasionally/ 1.5tin qwk     Alcohol use No      Comment: quit 7-1980     Family History   Problem Relation Age of Onset     Cancer Sister      brain tumor     Diabetes Sister      Alzheimer Disease Sister          Current Outpatient Prescriptions   Medication Sig Dispense Refill     albuterol (VENTOLIN HFA) 108 (90 BASE) MCG/ACT Inhaler INHALE TWO PUFFS BY MOUTH EVERY SIX HOURS AS NEEDED FOR SHORTNESS OF BREATH OR WHEEZING. 18 g 3     amLODIPine (NORVASC) 5 MG tablet TAKE ONE TABLET BY MOUTH ONE TIME DAILY  90 tablet 3     ASPIRIN 81 MG OR TABS ONE DAILY   "     busPIRone (BUSPAR) 5 MG tablet TAKE ONE TABLET BY MOUTH TWICE DAILY  180 tablet 3     cetirizine (ZYRTEC) 10 MG tablet Take 1 tablet (10 mg) by mouth every evening 30 tablet 9     fluticasone (FLONASE) 50 MCG/ACT spray Spray 1-2 sprays into both nostrils daily 1 Bottle 11     Fluticasone Propionate, Inhal, (FLOVENT DISKUS IN)        Fluticasone-Salmeterol (AIRDUO RESPICLICK 232/14) 232-14 MCG/ACT AEPB Inhale 1 Device into the lungs 2 times daily 1 each 3     Ibuprofen (ADVIL PO) Take 400 mg by mouth       meclizine (ANTIVERT) 25 MG tablet Take 1 tablet (25 mg) by mouth 3 times daily as needed for dizziness 30 tablet 12     multivitamin, therapeutic with minerals (MULTI-VITAMIN) TABS Take 1 tablet by mouth daily       nystatin (MYCOSTATIN) 569037 UNIT/ML suspension Take 5 mLs (500,000 Units) by mouth 4 times daily for 14 days 280 mL 0     omeprazole (PRILOSEC) 20 MG CR capsule Take 1 capsule (20 mg) by mouth 2 times daily 180 capsule 2     Spacer/Aero-Holding Chambers (VALVED HOLDING CHAMBER) JULISSA 1 Device 2 times daily To be used with flovent 1 each 1     timolol (TIMOPTIC) 0.5 % ophthalmic solution Apply 1 drop to eye 2 times daily       TIMOLOL 0.25 % OP SOLN Reported on 3/29/2017  0     Allergies   Allergen Reactions     No Known Drug Allergies      BP Readings from Last 3 Encounters:   07/16/18 128/68   06/26/18 135/76   05/02/18 108/58    Wt Readings from Last 3 Encounters:   07/16/18 186 lb (84.4 kg)   05/02/18 198 lb (89.8 kg)   03/30/18 198 lb 11.2 oz (90.1 kg)                  Labs reviewed in EPIC    ROS:  Constitutional, HEENT, cardiovascular, pulmonary, gi and gu systems are negative, except as otherwise noted.    OBJECTIVE:     /68 (BP Location: Right arm, Patient Position: Chair, Cuff Size: Adult Regular)  Pulse 84  Temp 97.9  F (36.6  C) (Temporal)  Resp 18  Ht 5' 10\" (1.778 m)  Wt 186 lb (84.4 kg)  SpO2 96%  BMI 26.69 kg/m2  Body mass index is 26.69 kg/(m^2).   Physical Exam "   Constitutional: He is oriented to person, place, and time. He appears well-developed and well-nourished.   HENT:   Head: Normocephalic and atraumatic.   Right Ear: External ear normal.   Left Ear: External ear normal.   Mouth/Throat: Oropharynx is clear and moist.   Thrush     Eyes: EOM are normal.   Neck: Neck supple.   Cardiovascular: Normal rate, regular rhythm, normal heart sounds and intact distal pulses.  Exam reveals no gallop.    No murmur heard.  Pulmonary/Chest: Effort normal and breath sounds normal.   Abdominal: Soft. Bowel sounds are normal.   Musculoskeletal: Normal range of motion.   Neurological: He is alert and oriented to person, place, and time.   Psychiatric: He has a normal mood and affect. His behavior is normal. Judgment and thought content normal.         Diagnostic Test Results:  none     ASSESSMENT/PLAN:   Patient is a very pleasant 83-year-old with history of hypertension, hyperlipidemia, tobacco use hyponatremia who presents to the clinic for a post ED follow-up for further evaluation of his dizziness.  He reports intermittent episodes of dizziness which seems to be affecting his activities of daily living.  The symptoms have been present since March after a fall on ice and has had intermittent right-sided headaches with worsening symptoms when he turns his head from side to side.  He got an MRI done at the ER last week of June to have this evaluated.  MRI did not show any concerns for acute infarct, mass or hemorrhage.  He did show some chronic changes consistent with microvascular ischemic disease.  He was prescribed a trial of meclizine which according to the patient has not helped.  He is here in the clinic to discuss further evaluation and treatment.  On review of his chart it is noted that he has been losing weight and also complains of loss of appetite over the past months he lost 12 pounds.  Also noted was his history of chronic intermittent hyponatremia.  Is also noted that he  was positive for NOEMI and anti-Ro antibodies at one-point.  There has not been any changes in his medications recently and he does not endorse any orthostatic symptoms today and hence I do not think his medications could be contributing to his symptoms.  Based on the above findings I did suggest getting a chest x-ray to rule out any subacute pathology or masses.  Also repeated blood chemistry panel to look for any worsening hyponatremia that could be contributing to his symptoms.  He is someone whose sodium tends to run between 128 to low 130s.  Also will get CRP to look for autoimmune conditions given his history of positive anti-Ro antibodies in the past.  If the above evaluation is inconclusive IV.  Rotational chair testing to rule out vestibular and cerebellar pathology.  I advised close follow-up in a month.  If he continues to lose weight I think it would be reasonable to get the CT of the chest to rule out masses further evaluation      Problem List Items Addressed This Visit     Hyponatremia      Other Visit Diagnoses     Dizziness    -  Primary    Relevant Orders    Lipid panel reflex to direct LDL Fasting (Completed)    CBC with platelets and differential (Completed)    Comprehensive metabolic panel (BMP + Alb, Alk Phos, ALT, AST, Total. Bili, TP) (Completed)    TSH with free T4 reflex (Completed)    *UA reflex to Microscopic (Completed)    CRP, inflammation (Completed)    XR Chest 2 Views (Completed)    Thrush        Relevant Medications    nystatin (MYCOSTATIN) [id number] UNIT/ML suspension         Mally Farnsworth MD  [facility]

## 2018-07-16 ENCOUNTER — RADIANT APPOINTMENT (OUTPATIENT)
Dept: GENERAL RADIOLOGY | Facility: OTHER | Age: 83
End: 2018-07-16
Attending: FAMILY MEDICINE
Payer: COMMERCIAL

## 2018-07-16 ENCOUNTER — TELEPHONE (OUTPATIENT)
Dept: FAMILY MEDICINE | Facility: OTHER | Age: 83
End: 2018-07-16

## 2018-07-16 ENCOUNTER — OFFICE VISIT (OUTPATIENT)
Dept: FAMILY MEDICINE | Facility: OTHER | Age: 83
End: 2018-07-16
Payer: COMMERCIAL

## 2018-07-16 VITALS
WEIGHT: 186 LBS | RESPIRATION RATE: 18 BRPM | TEMPERATURE: 97.9 F | SYSTOLIC BLOOD PRESSURE: 128 MMHG | OXYGEN SATURATION: 96 % | HEART RATE: 84 BPM | HEIGHT: 70 IN | DIASTOLIC BLOOD PRESSURE: 68 MMHG | BODY MASS INDEX: 26.63 KG/M2

## 2018-07-16 DIAGNOSIS — R42 DIZZINESS: ICD-10-CM

## 2018-07-16 DIAGNOSIS — E87.1 HYPONATREMIA: ICD-10-CM

## 2018-07-16 DIAGNOSIS — B37.0 THRUSH: ICD-10-CM

## 2018-07-16 DIAGNOSIS — R42 DIZZINESS: Primary | ICD-10-CM

## 2018-07-16 DIAGNOSIS — E87.1 HYPONATREMIA: Primary | ICD-10-CM

## 2018-07-16 LAB
ALBUMIN SERPL-MCNC: 3.1 G/DL (ref 3.4–5)
ALBUMIN UR-MCNC: NEGATIVE MG/DL
ALP SERPL-CCNC: 90 U/L (ref 40–150)
ALT SERPL W P-5'-P-CCNC: 26 U/L (ref 0–70)
ANION GAP SERPL CALCULATED.3IONS-SCNC: 13 MMOL/L (ref 3–14)
APPEARANCE UR: CLEAR
AST SERPL W P-5'-P-CCNC: 39 U/L (ref 0–45)
BASOPHILS # BLD AUTO: 0.1 10E9/L (ref 0–0.2)
BASOPHILS NFR BLD AUTO: 1.2 %
BILIRUB SERPL-MCNC: 1.1 MG/DL (ref 0.2–1.3)
BILIRUB UR QL STRIP: NEGATIVE
BUN SERPL-MCNC: 7 MG/DL (ref 7–30)
CALCIUM SERPL-MCNC: 8.3 MG/DL (ref 8.5–10.1)
CHLORIDE SERPL-SCNC: 94 MMOL/L (ref 94–109)
CHOLEST SERPL-MCNC: 130 MG/DL
CO2 SERPL-SCNC: 20 MMOL/L (ref 20–32)
COLOR UR AUTO: YELLOW
CREAT SERPL-MCNC: 0.86 MG/DL (ref 0.66–1.25)
CRP SERPL-MCNC: 24.4 MG/L (ref 0–8)
DIFFERENTIAL METHOD BLD: NORMAL
EOSINOPHIL # BLD AUTO: 0.4 10E9/L (ref 0–0.7)
EOSINOPHIL NFR BLD AUTO: 5.2 %
ERYTHROCYTE [DISTWIDTH] IN BLOOD BY AUTOMATED COUNT: 12.6 % (ref 10–15)
GFR SERPL CREATININE-BSD FRML MDRD: 84 ML/MIN/1.7M2
GLUCOSE SERPL-MCNC: 86 MG/DL (ref 70–99)
GLUCOSE UR STRIP-MCNC: NEGATIVE MG/DL
HCT VFR BLD AUTO: 43 % (ref 40–53)
HDLC SERPL-MCNC: 27 MG/DL
HGB BLD-MCNC: 15.3 G/DL (ref 13.3–17.7)
HGB UR QL STRIP: NEGATIVE
KETONES UR STRIP-MCNC: NEGATIVE MG/DL
LDLC SERPL CALC-MCNC: 84 MG/DL
LEUKOCYTE ESTERASE UR QL STRIP: NEGATIVE
LYMPHOCYTES # BLD AUTO: 1.5 10E9/L (ref 0.8–5.3)
LYMPHOCYTES NFR BLD AUTO: 17.9 %
MCH RBC QN AUTO: 29.2 PG (ref 26.5–33)
MCHC RBC AUTO-ENTMCNC: 35.6 G/DL (ref 31.5–36.5)
MCV RBC AUTO: 82 FL (ref 78–100)
MONOCYTES # BLD AUTO: 1.1 10E9/L (ref 0–1.3)
MONOCYTES NFR BLD AUTO: 13.3 %
NEUTROPHILS # BLD AUTO: 5.3 10E9/L (ref 1.6–8.3)
NEUTROPHILS NFR BLD AUTO: 62.4 %
NITRATE UR QL: NEGATIVE
NONHDLC SERPL-MCNC: 103 MG/DL
PH UR STRIP: 7 PH (ref 5–7)
PLATELET # BLD AUTO: 208 10E9/L (ref 150–450)
POTASSIUM SERPL-SCNC: 4.4 MMOL/L (ref 3.4–5.3)
PROT SERPL-MCNC: 7.3 G/DL (ref 6.8–8.8)
RBC # BLD AUTO: 5.24 10E12/L (ref 4.4–5.9)
SODIUM SERPL-SCNC: 127 MMOL/L (ref 133–144)
SOURCE: NORMAL
SP GR UR STRIP: <=1.005 (ref 1–1.03)
TRIGL SERPL-MCNC: 93 MG/DL
TSH SERPL DL<=0.005 MIU/L-ACNC: 1.01 MU/L (ref 0.4–4)
UROBILINOGEN UR STRIP-ACNC: 0.2 EU/DL (ref 0.2–1)
WBC # BLD AUTO: 8.5 10E9/L (ref 4–11)

## 2018-07-16 PROCEDURE — 85025 COMPLETE CBC W/AUTO DIFF WBC: CPT | Performed by: FAMILY MEDICINE

## 2018-07-16 PROCEDURE — 86140 C-REACTIVE PROTEIN: CPT | Performed by: FAMILY MEDICINE

## 2018-07-16 PROCEDURE — 81003 URINALYSIS AUTO W/O SCOPE: CPT | Performed by: FAMILY MEDICINE

## 2018-07-16 PROCEDURE — 80061 LIPID PANEL: CPT | Performed by: FAMILY MEDICINE

## 2018-07-16 PROCEDURE — 36415 COLL VENOUS BLD VENIPUNCTURE: CPT | Performed by: FAMILY MEDICINE

## 2018-07-16 PROCEDURE — 99214 OFFICE O/P EST MOD 30 MIN: CPT | Performed by: FAMILY MEDICINE

## 2018-07-16 PROCEDURE — 84443 ASSAY THYROID STIM HORMONE: CPT | Performed by: FAMILY MEDICINE

## 2018-07-16 PROCEDURE — 80053 COMPREHEN METABOLIC PANEL: CPT | Performed by: FAMILY MEDICINE

## 2018-07-16 PROCEDURE — 71046 X-RAY EXAM CHEST 2 VIEWS: CPT

## 2018-07-16 RX ORDER — NYSTATIN 100000/ML
500000 SUSPENSION, ORAL (FINAL DOSE FORM) ORAL 4 TIMES DAILY
Qty: 280 ML | Refills: 0 | Status: SHIPPED | OUTPATIENT
Start: 2018-07-16 | End: 2018-07-30

## 2018-07-16 ASSESSMENT — PAIN SCALES - GENERAL: PAINLEVEL: NO PAIN (0)

## 2018-07-16 NOTE — MR AVS SNAPSHOT
"              After Visit Summary   7/16/2018    Jerry Rodriguez    MRN: [id number]           Patient Information     Date Of Birth          1935        Visit Information        Provider Department      7/16/2018 10:20 AM Mally Farnsworth MD [facility]        Today's Diagnoses     Dizziness    -  1    Thrush        Hyponatremia           Follow-ups after your visit        Follow-up notes from your care team     Return in about 2 weeks (around 7/30/2018).      Future tests that were ordered for you today     Open Future Orders        Priority Expected Expires Ordered    XR Chest 2 Views Routine 7/16/2018 7/16/2019 7/16/2018            Who to contact     If you have questions or need follow up information about today's clinic visit or your schedule please contact [facility] directly at [phone].  Normal or non-critical lab and imaging results will be communicated to you by MyChart, letter or phone within 4 business days after the clinic has received the results. If you do not hear from us within 7 days, please contact the clinic through MyChart or phone. If you have a critical or abnormal lab result, we will notify you by phone as soon as possible.  Submit refill requests through [employer] or call your pharmacy and they will forward the refill request to us. Please allow 3 business days for your refill to be completed.          Additional Information About Your Visit        Care EveryWhere ID     This is your Care EveryWhere ID. This could be used by other organizations to access your [place] medical records  [id number]        Your Vitals Were     Pulse Temperature Respirations Height Pulse Oximetry BMI (Body Mass Index)    84 97.9  F (36.6  C) (Temporal) 18 5' 10\" (1.778 m) 96% 26.69 kg/m2       Blood Pressure from Last 3 Encounters:   07/16/18 128/68   06/26/18 135/76   05/02/18 108/58    Weight from Last 3 Encounters:   07/16/18 186 lb (84.4 kg)   05/02/18 198 lb (89.8 " kg)   03/30/18 198 lb 11.2 oz (90.1 kg)              We Performed the Following     *UA reflex to Microscopic     CBC with platelets and differential     Comprehensive metabolic panel (BMP + Alb, Alk Phos, ALT, AST, Total. Bili, TP)     CRP, inflammation     Lipid panel reflex to direct LDL Fasting     TSH with free T4 reflex          Today's Medication Changes          These changes are accurate as of 7/16/18 11:06 AM.  If you have any questions, ask your nurse or doctor.               Start taking these medicines.        Dose/Directions    nystatin 128300 UNIT/ML suspension   Commonly known as:  MYCOSTATIN   Used for:  Thrush   Started by:  Mally Farnsworth MD        Dose:  250559 Units   Take 5 mLs (500,000 Units) by mouth 4 times daily for 14 days   Quantity:  280 mL   Refills:  0            Where to get your medicines      These medications were sent to [facility] [address] [place], [place] [address] [address] [address]     Phone:  [phone]     nystatin [id number] UNIT/ML suspension                Primary Care Provider Office Phone # Fax #    Roberta Beaver [phone]       [address] [facility] [address]        Equal Access to Services     TAYLER LOPEZ AH: Hadii tashia medinao Sojacinto, waaxda luqadaha, qaybta kaalmada adegilsonyada, kenyatta vallecillo. So [facility] [phone].    ATENCIÓN: Si habla español, tiene a billingsley disposición servicios gratuitos de asistencia lingüística. [facility] [phone].    We comply with applicable federal civil rights laws and [place] laws. We do not discriminate on the basis of race, color, national origin, age, disability, sex, sexual orientation, or gender identity.            Thank you!     Thank you for choosing [facility]  for your care. Our goal is always to provide you with excellent care. Hearing back from our patients is one way we can continue to improve our services. Please take a  few minutes to complete the written survey that you may receive in the mail after your visit with us. Thank you!             Your Updated Medication List - Protect others around you: Learn how to safely use, store and throw away your medicines at www.disposemymeds.org.          This list is accurate as of 7/16/18 11:06 AM.  Always use your most recent med list.                   Brand Name Dispense Instructions for use Diagnosis    ADVIL PO      Take 400 mg by mouth        albuterol 108 (90 Base) MCG/ACT Inhaler    VENTOLIN HFA    18 g    INHALE TWO PUFFS BY MOUTH EVERY SIX HOURS AS NEEDED FOR SHORTNESS OF BREATH OR WHEEZING.    Cough       amLODIPine 5 MG tablet    NORVASC    90 tablet    TAKE ONE TABLET BY MOUTH ONE TIME DAILY    Essential hypertension       aspirin 81 MG tablet      ONE DAILY        busPIRone 5 MG tablet    BUSPAR    180 tablet    TAKE ONE TABLET BY MOUTH TWICE DAILY    Anxiety state       cetirizine 10 MG tablet    zyrTEC    30 tablet    Take 1 tablet (10 mg) by mouth every evening    Seasonal allergic rhinitis, unspecified allergic rhinitis trigger       FLOVENT DISKUS IN           fluticasone 50 MCG/ACT spray    FLONASE    1 Bottle    Spray 1-2 sprays into both nostrils daily    Chronic rhinitis, unspecified type       Fluticasone-Salmeterol 232-14 MCG/ACT Aepb inhaler    AIRDUO RESPICLICK 232/14    1 each    Inhale 1 Device into the lungs 2 times daily    Simple chronic bronchitis (H)       meclizine 25 MG tablet    ANTIVERT    30 tablet    Take 1 tablet (25 mg) by mouth 3 times daily as needed for dizziness    Vertigo       Multi-vitamin Tabs tablet      Take 1 tablet by mouth daily        nystatin [id number] UNIT/ML suspension    MYCOSTATIN    280 mL    Take 5 mLs (500,000 Units) by mouth 4 times daily for 14 days    Thrush       omeprazole 20 MG CR capsule    priLOSEC    180 capsule    Take 1 capsule (20 mg) by mouth 2 times daily    Gastroesophageal reflux disease without esophagitis        timolol 0.5 % ophthalmic solution    TIMOPTIC     Apply 1 drop to eye 2 times daily        timolol hemihydrate 0.25 % Soln ophthalmic solution    BETIMOL     Reported on 3/29/2017        VALVED HOLDING CHAMBER [place]     1 each    1 Device 2 times daily To be used with flovent    Simple chronic bronchitis (H)

## 2018-07-16 NOTE — TELEPHONE ENCOUNTER
Patient informed. He is wondering what the plan is from here.   He said something about us sending it down to [place]? He was unsure. He also said somethng about having to wait two weeks for something? He said Dr. Farnsworth would know what he's talking about.    Will route message. Note not complete so unsure of the plan.  Chely Stokes, [facility]

## 2018-07-16 NOTE — TELEPHONE ENCOUNTER
----- Message from Mally Farnsworth MD sent at 7/16/2018  6:10 PM CDT -----  Please inform patient of the chest x-ray did not show any abnormality

## 2018-07-17 NOTE — TELEPHONE ENCOUNTER
Please inform patient that the labs did show low sodium levels which could contribute to the dizziness.  1 of the inflammatory markers is elevated which is a nonspecific finding and does not indicate any specific disease.  the rest of the labs came back essentially normal and did not explain the reason for his dizziness.       I would recommend getting a vestibular testing to evaluate this further . Referral placed. Please help schedule

## 2018-07-18 NOTE — TELEPHONE ENCOUNTER
Informed Jerry regarding recommendation for Vestibular studys.- a referral was place by Dr. Farnsworth and I gave Jerry and his wife the number to call and schedule this..  He needs to get a ride from a neighbor

## 2018-07-30 ENCOUNTER — TELEPHONE (OUTPATIENT)
Dept: FAMILY MEDICINE | Facility: OTHER | Age: 83
End: 2018-07-30

## 2018-07-30 NOTE — TELEPHONE ENCOUNTER
You placed a referral for patient to audiology on 7/17/18.  Patient has not scheduled as of yet.      Please review and forward to team if follow up with the patient is needed.     Thank you!  Tiffanie/Clinic Referrals Dyad II

## 2018-09-06 DIAGNOSIS — R05.9 COUGH: ICD-10-CM

## 2018-09-06 RX ORDER — ALBUTEROL SULFATE 90 UG/1
AEROSOL, METERED RESPIRATORY (INHALATION)
Qty: 18 G | Refills: 2 | Status: SHIPPED | OUTPATIENT
Start: 2018-09-06 | End: 2019-02-24

## 2018-09-06 NOTE — TELEPHONE ENCOUNTER
"Requested Prescriptions   Pending Prescriptions Disp Refills     VENTOLIN  (90 Base) MCG/ACT inhaler [Pharmacy Med Name: Ventolin HFA Inhalation Aerosol Solution 108 (90 Base) MCG/ACT] 18 g 2     Sig: INHALE TWO PUFFS BY MOUTH EVERY SIX HOURS AS NEEDED FOR SHORTNESS OF BREATH OR WHEEZING    Asthma Maintenance Inhalers - Anticholinergics Passed    9/6/2018  9:42 AM  No flowsheet data found.         Passed - Patient is age 12 years or older       Passed - Recent (12 mo) or future (30 days) visit within the authorizing provider's specialty    Patient had office visit in the last 12 months or has a visit in the next 30 days with authorizing provider or within the authorizing provider's specialty.  See \"Patient Info\" tab in inbasket, or \"Choose Columns\" in Meds & Orders section of the refill encounter.            albuterol (VENTOLIN HFA) 108 (90 BASE) MCG/ACT Inhaler  Routing refill request to provider for review/approval because:  Drug not on the [facility] refill protocol for diagnosis of cough    Mary Hook RN, BSN           "

## 2018-09-13 ENCOUNTER — TELEPHONE (OUTPATIENT)
Dept: FAMILY MEDICINE | Facility: OTHER | Age: 83
End: 2018-09-13

## 2018-09-13 NOTE — TELEPHONE ENCOUNTER
Reason for Call:  Form, our goal is to have forms completed with 72 hours, however, some forms may require a visit or additional information.    Type of letter, form or note:  medical    Who is the form from?: [facility] (if other please explain)    Where did the form come from: form was faxed in    What clinic location was the form placed at?: [facility] - [phone]    Where the form was placed: 's Box    What number is listed as a contact on the form?: [phone]       Additional comments: please fax completed form to [phone]    Call taken on 9/13/2018 at 5:08 PM by Carmina Smith

## 2018-10-05 DIAGNOSIS — F41.1 ANXIETY STATE: ICD-10-CM

## 2018-10-08 RX ORDER — BUSPIRONE HYDROCHLORIDE 5 MG/1
TABLET ORAL
Qty: 180 TABLET | Refills: 2 | Status: SHIPPED | OUTPATIENT
Start: 2018-10-08 | End: 2019-06-19

## 2018-10-08 NOTE — TELEPHONE ENCOUNTER
Buspar:  Prescription approved per [facility] Refill Protocol.    Samantha Burgess, RN, BSN

## 2019-02-17 ENCOUNTER — HOSPITAL ENCOUNTER (EMERGENCY)
Facility: CLINIC | Age: 84
Discharge: HOME | End: 2019-02-17
Admitting: NURSE PRACTITIONER
Payer: COMMERCIAL

## 2019-02-17 ENCOUNTER — APPOINTMENT (OUTPATIENT)
Dept: CT IMAGING | Facility: CLINIC | Age: 84
End: 2019-02-17
Attending: NURSE PRACTITIONER
Payer: COMMERCIAL

## 2019-02-17 ENCOUNTER — APPOINTMENT (OUTPATIENT)
Dept: GENERAL RADIOLOGY | Facility: CLINIC | Age: 84
End: 2019-02-17
Attending: NURSE PRACTITIONER
Payer: COMMERCIAL

## 2019-02-17 VITALS
SYSTOLIC BLOOD PRESSURE: 147 MMHG | HEART RATE: 69 BPM | TEMPERATURE: 97.5 F | RESPIRATION RATE: 18 BRPM | WEIGHT: 195 LBS | DIASTOLIC BLOOD PRESSURE: 99 MMHG | BODY MASS INDEX: 28.88 KG/M2 | HEIGHT: 69 IN | OXYGEN SATURATION: 97 %

## 2019-02-17 DIAGNOSIS — R42 VERTIGO: ICD-10-CM

## 2019-02-17 DIAGNOSIS — I10 ESSENTIAL HYPERTENSION: ICD-10-CM

## 2019-02-17 LAB
ALBUMIN SERPL-MCNC: 3.5 G/DL (ref 3.4–5)
ALP SERPL-CCNC: 77 U/L (ref 40–150)
ALT SERPL W P-5'-P-CCNC: 14 U/L (ref 0–70)
ANION GAP SERPL CALCULATED.3IONS-SCNC: 7 MMOL/L (ref 3–14)
APTT PPP: 30 SEC (ref 22–37)
AST SERPL W P-5'-P-CCNC: 13 U/L (ref 0–45)
BASOPHILS # BLD AUTO: 0 10E9/L (ref 0–0.2)
BASOPHILS NFR BLD AUTO: 0.4 %
BILIRUB SERPL-MCNC: 0.8 MG/DL (ref 0.2–1.3)
BUN SERPL-MCNC: 7 MG/DL (ref 7–30)
CALCIUM SERPL-MCNC: 8.3 MG/DL (ref 8.5–10.1)
CHLORIDE SERPL-SCNC: 98 MMOL/L (ref 94–109)
CO2 SERPL-SCNC: 27 MMOL/L (ref 20–32)
CREAT SERPL-MCNC: 0.9 MG/DL (ref 0.66–1.25)
DIFFERENTIAL METHOD BLD: NORMAL
EOSINOPHIL NFR BLD AUTO: 3.1 %
ERYTHROCYTE [DISTWIDTH] IN BLOOD BY AUTOMATED COUNT: 12.5 % (ref 10–15)
GFR SERPL CREATININE-BSD FRML MDRD: 78 ML/MIN/{1.73_M2}
GLUCOSE SERPL-MCNC: 91 MG/DL (ref 70–99)
HCT VFR BLD AUTO: 41.7 % (ref 40–53)
HGB BLD-MCNC: 14.4 G/DL (ref 13.3–17.7)
IMM GRANULOCYTES # BLD: 0 10E9/L (ref 0–0.4)
IMM GRANULOCYTES NFR BLD: 0.1 %
INR PPP: 1.07 (ref 0.86–1.14)
LYMPHOCYTES # BLD AUTO: 1.9 10E9/L (ref 0.8–5.3)
LYMPHOCYTES NFR BLD AUTO: 26.9 %
MAGNESIUM SERPL-MCNC: 1.7 MG/DL (ref 1.6–2.3)
MCH RBC QN AUTO: 29.7 PG (ref 26.5–33)
MCHC RBC AUTO-ENTMCNC: 34.5 G/DL (ref 31.5–36.5)
MCV RBC AUTO: 86 FL (ref 78–100)
MONOCYTES # BLD AUTO: 0.7 10E9/L (ref 0–1.3)
MONOCYTES NFR BLD AUTO: 9.9 %
NEUTROPHILS # BLD AUTO: 4.2 10E9/L (ref 1.6–8.3)
NEUTROPHILS NFR BLD AUTO: 59.6 %
NRBC # BLD AUTO: 0 10*3/UL
NRBC BLD AUTO-RTO: 0 /100
PLATELET # BLD AUTO: 202 10E9/L (ref 150–450)
POTASSIUM SERPL-SCNC: 3.9 MMOL/L (ref 3.4–5.3)
PROT SERPL-MCNC: 7.2 G/DL (ref 6.8–8.8)
RBC # BLD AUTO: 4.85 10E12/L (ref 4.4–5.9)
SODIUM SERPL-SCNC: 132 MMOL/L (ref 133–144)
TROPONIN I SERPL-MCNC: 0.02 UG/L (ref 0–0.04)
WBC # BLD AUTO: 7 10E9/L (ref 4–11)

## 2019-02-17 PROCEDURE — 84484 ASSAY OF TROPONIN QUANT: CPT | Performed by: NURSE PRACTITIONER

## 2019-02-17 PROCEDURE — 25000128 H RX IP 250 OP 636: Performed by: NURSE PRACTITIONER

## 2019-02-17 PROCEDURE — 83735 ASSAY OF MAGNESIUM: CPT | Performed by: NURSE PRACTITIONER

## 2019-02-17 PROCEDURE — 85610 PROTHROMBIN TIME: CPT | Performed by: NURSE PRACTITIONER

## 2019-02-17 PROCEDURE — 70450 CT HEAD/BRAIN W/O DYE: CPT

## 2019-02-17 PROCEDURE — 85730 THROMBOPLASTIN TIME PARTIAL: CPT | Performed by: NURSE PRACTITIONER

## 2019-02-17 PROCEDURE — 99285 EMERGENCY DEPT VISIT HI MDM: CPT | Mod: 25 | Performed by: NURSE PRACTITIONER

## 2019-02-17 PROCEDURE — 93005 ELECTROCARDIOGRAM TRACING: CPT | Performed by: NURSE PRACTITIONER

## 2019-02-17 PROCEDURE — 93010 ELECTROCARDIOGRAM REPORT: CPT | Mod: Z6 | Performed by: NURSE PRACTITIONER

## 2019-02-17 PROCEDURE — 85025 COMPLETE CBC W/AUTO DIFF WBC: CPT | Performed by: NURSE PRACTITIONER

## 2019-02-17 PROCEDURE — 80053 COMPREHEN METABOLIC PANEL: CPT | Performed by: NURSE PRACTITIONER

## 2019-02-17 PROCEDURE — 96360 HYDRATION IV INFUSION INIT: CPT | Performed by: NURSE PRACTITIONER

## 2019-02-17 PROCEDURE — 71046 X-RAY EXAM CHEST 2 VIEWS: CPT | Mod: TC

## 2019-02-17 RX ORDER — SODIUM CHLORIDE 9 MG/ML
1000 INJECTION, SOLUTION INTRAVENOUS CONTINUOUS
Status: DISCONTINUED | OUTPATIENT
Start: 2019-02-17 | End: 2019-02-17 | Stop reason: HOSPADM

## 2019-02-17 RX ADMIN — SODIUM CHLORIDE 1000 ML: 9 INJECTION, SOLUTION INTRAVENOUS at 14:11

## 2019-02-17 ASSESSMENT — ENCOUNTER SYMPTOMS
TREMORS: 0
FACIAL ASYMMETRY: 0
EYE REDNESS: 0
HEADACHES: 0
ACTIVITY CHANGE: 0
SEIZURES: 0
PALPITATIONS: 0
HEMATOLOGIC/LYMPHATIC NEGATIVE: 1
SPEECH DIFFICULTY: 0
ARTHRALGIAS: 1
BLOOD IN STOOL: 0
TROUBLE SWALLOWING: 0
NUMBNESS: 0
LIGHT-HEADEDNESS: 0
WEAKNESS: 0
DIZZINESS: 1
CHEST TIGHTNESS: 0
VOMITING: 0
AGITATION: 0
STRIDOR: 0
COUGH: 1
DIAPHORESIS: 0
WHEEZING: 0
ABDOMINAL PAIN: 0
CONFUSION: 0

## 2019-02-17 ASSESSMENT — MIFFLIN-ST. JEOR: SCORE: 1564.89

## 2019-02-17 NOTE — ED PROVIDER NOTES
History     Chief Complaint   Patient presents with     Dizziness     HPI  Jerry Rodriguez is a 84 year old male who presents with years of intermittent dizziness most recent yesterday around 1700 as he was sitting and eating. Patient reports he felt like the room was spinning and became nauseated and weak feeling.  He took a Meclizine and after 15-20 min went away.  He is presenting for further evaluation at the urge of his wife this morning. He presently is asymptomatic. He reports he was referred to an ENT but does not like them so cancelled his appointment. He reports no associated CP, heart palpitations, dyspnea, headache, tinnitus, and wife reports no stroke like symptoms. Patient reports he was able to walk steady with his cane.     PCP: Roberta Beaver     Allergies:  Allergies   Allergen Reactions     No Known Drug Allergies        Problem List:    Patient Active Problem List    Diagnosis Date Noted     Simple chronic bronchitis (H) 08/10/2017     Priority: Medium     Lumbosacral neuritis - moderate at L4/5 08/01/2014     Priority: Medium     Multilevel.  Moderate at L4/5.  MRI 7/2014       Degeneration of lumbar intervertebral disc 08/01/2014     Priority: Medium     HTN, goal below 140/90 03/31/2014     Priority: Medium     Vertigo 03/31/2014     Priority: Medium     Adenomatous polyp of colon 03/31/2014     Priority: Medium     Bullous dermatitis 08/13/2013     Priority: Medium     NOEMI positive 02/20/2013     Priority: Medium     Hammer toe 02/06/2013     Priority: Medium     Corn 02/06/2013     Priority: Medium     Degenerative arthritis of finger 05/23/2011     Priority: Medium     (Problem list name updated by automated process. Provider to review and confirm.)       Hyponatremia 01/13/2011     Priority: Medium     Tobacco use disorder 02/13/2009     Priority: Medium     HTN (hypertension) 01/28/2009     Priority: Medium     Anxiety state      Priority: Medium     Problem list name updated by  automated process. Provider to review       Stricture and stenosis of esophagus 08/02/2004     Priority: Medium     Primary localized osteoarthrosis of shoulder region 01/07/2004     Priority: Medium     Problem list name updated by automated process. Provider to review       Chronic rhinitis 04/05/2002     Priority: Medium     Generalized osteoarthrosis, unspecified site 04/05/2002     Priority: Medium     Other atopic dermatitis and related conditions      Priority: Medium        Past Medical History:    Past Medical History:   Diagnosis Date     Anxiety state, unspecified      Arthritis      Hypertension      Hyponatremia      Inguinal hernia with obstruction, without mention of gangrene, unilateral or unspecified, (not specified as recurrent)      Inguinal hernia without mention of obstruction or gangrene, unilateral or unspecified, (not specified as recurrent) 11/07/2000     Other joint derangement, not elsewhere classified, forearm 1980     Pathologic fracture of neck of femur (H)      Stricture and stenosis of esophagus 2004       Past Surgical History:    Past Surgical History:   Procedure Laterality Date     C OSTEOTOMY HIP/FEMUR  1980    traumatic fracture right hip     COLONOSCOPY  10/17/2007    Repeat Colonoscopy in 5 years for surveillance.     COLONOSCOPY N/A 1/24/2018    Procedure: COLONOSCOPY;;  Surgeon: Don Arambula MD;  Location:  GI     ESOPHAGOSCOPY, GASTROSCOPY, DUODENOSCOPY (EGD), COMBINED  1/4/2011    COMBINED ESOPHAGOSCOPY, GASTROSCOPY, DUODENOSCOPY (EGD), ESOPHAGEAL DILATION BALLOON LESS THAN 30MM performed by ELEN BUSBY at  GI     ESOPHAGOSCOPY, GASTROSCOPY, DUODENOSCOPY (EGD), COMBINED N/A 9/25/2015    Procedure: COMBINED ESOPHAGOSCOPY, GASTROSCOPY, DUODENOSCOPY (EGD), BIOPSY SINGLE OR MULTIPLE;  Surgeon: Markell Lawson MD;  Location:  GI     ESOPHAGOSCOPY, GASTROSCOPY, DUODENOSCOPY (EGD), COMBINED N/A 1/24/2018    Procedure: COMBINED ESOPHAGOSCOPY, GASTROSCOPY,  DUODENOSCOPY (EGD), BIOPSY SINGLE OR MULTIPLE;;  Surgeon: Don Arambula MD;  Location: [facility]     ESOPHAGOSCOPY, GASTROSCOPY, DUODENOSCOPY (EGD), DILATATION, COMBINED N/A 1/24/2018    Procedure: COMBINED ESOPHAGOSCOPY, GASTROSCOPY, DUODENOSCOPY (EGD), DILATATION;  Esophagogastroduodenoscopy with Dilatation and Biopsy by Biopsy, Colonoscopy;  Surgeon: Don Arambula MD;  Location: [facility]     HC COLONOSCOPY THRU STOMA, DIAGNOSTIC  11/15/2000    Recurrent abdominal pain     HC COLONOSCOPY W/WO BRUSH/WASH  07/19/2004     HC REPAIR INCISIONAL HERNIA,REDUCIBLE  11/10/2000    Hernia Repair, Incisional, Unilateral, Left inguinal hernia and umbilical hernia     HC UGI ENDOSCOPY DIAG W OR W/O BRUSH/WASH  07/19/2004     HC UGI ENDOSCOPY DIAG W OR W/O BRUSH/WASH  05/24/2005    Peptic stricture of esophagus, dilated to 18 mm. Erosive reflux esophagitis. Hiatal hernia. Erosive duodenitis.      UGI ENDOSCOPY, SIMPLE EXAM  02/20/07      UGI ENDOSCOPY, SIMPLE EXAM  12/02/08    Schiatzky's ring, dialated, PPI indefinitely      UGI ENDOSCOPY, SIMPLE EXAM  12/15/2009    benign stricture with dialation     LAPAROSCOPIC APPENDECTOMY  09/15/2005       Family History:    Family History   Problem Relation Age of Onset     Cancer Sister         brain tumor     Diabetes Sister      Alzheimer Disease Sister        Social History:  Marital Status:   [2]  Social History     Tobacco Use     Smoking status: Current Every Day Smoker     Packs/day: 1.00     Years: 52.00     Pack years: 52.00     Types: Cigarettes     Smokeless tobacco: Current User     Types: Chew     Tobacco comment: occasionally/ 1.5tin qwk   Substance Use Topics     Alcohol use: No     Comment: quit 7-1980     Drug use: No        Medications:      amLODIPine (NORVASC) 5 MG tablet   ASPIRIN 81 MG OR TABS   busPIRone (BUSPAR) 5 MG tablet   cetirizine (ZYRTEC) 10 MG tablet   fluticasone (FLONASE) 50 MCG/ACT spray   Fluticasone Propionate, Inhal, (FLOVENT  "DISKUS IN)   Fluticasone-Salmeterol (AIRDUO RESPICLICK 232/14) 232-14 MCG/ACT AEPB   Ibuprofen (ADVIL PO)   meclizine (ANTIVERT) 25 MG tablet   multivitamin, therapeutic with minerals (MULTI-VITAMIN) TABS   omeprazole (PRILOSEC) 20 MG CR capsule   Spacer/Aero-Holding Chambers (VALVED HOLDING CHAMBER) JULISSA   timolol (TIMOPTIC) 0.5 % ophthalmic solution   TIMOLOL 0.25 % OP SOLN   VENTOLIN  (90 Base) MCG/ACT inhaler         Review of Systems   Constitutional: Negative for activity change and diaphoresis.   HENT: Negative for tinnitus and trouble swallowing.    Eyes: Negative for redness.   Respiratory: Positive for cough. Negative for chest tightness, wheezing and stridor.    Cardiovascular: Negative for chest pain and palpitations.   Gastrointestinal: Negative for abdominal pain, blood in stool and vomiting.   Musculoskeletal: Positive for arthralgias.   Skin: Negative.    Neurological: Positive for dizziness. Negative for tremors, seizures, syncope, facial asymmetry, speech difficulty, weakness, light-headedness, numbness and headaches.   Hematological: Negative.    Psychiatric/Behavioral: Negative for agitation and confusion.       Physical Exam   BP: (!) 154/95  Pulse: 79  Heart Rate: 69  Temp: 97.5  F (36.4  C)  Resp: 19  Height: 175.3 cm (5' 9\")  Weight: 88.5 kg (195 lb)  SpO2: 100 %  Lying Orthostatic BP: 119/68  Lying Orthostatic Pulse: 66 bpm  Sitting Orthostatic BP: 113/76  Sitting Orthostatic Pulse: 71 bpm  Standing Orthostatic BP: 109/73  Standing Orthostatic Pulse: 78 bpm      Physical Exam   Constitutional: He appears well-developed and well-nourished. He is active and cooperative.  Non-toxic appearance. He does not have a sickly appearance. He does not appear ill. No distress.   HENT:   Head: Normocephalic and atraumatic.   Right Ear: Tympanic membrane, external ear and ear canal normal. Decreased hearing is noted.   Left Ear: Tympanic membrane, external ear and ear canal normal. Decreased hearing " is noted.   Mouth/Throat: Uvula is midline, oropharynx is clear and moist and mucous membranes are normal.   Eyes: Conjunctivae, EOM and lids are normal. Pupils are equal, round, and reactive to light.   Neck: Neck supple. No JVD present. Carotid bruit is not present.   Cardiovascular: Normal rate, regular rhythm and normal heart sounds.   Pulmonary/Chest: Effort normal and breath sounds normal.   Abdominal: Soft. Bowel sounds are normal.   Musculoskeletal: He exhibits no edema.   Neurological: He is alert. He has normal strength. He is not disoriented. No cranial nerve deficit or sensory deficit. He displays a negative Romberg sign. Coordination normal. GCS eye subscore is 4. GCS verbal subscore is 5. GCS motor subscore is 6.   Patient walking steady with his cane no ataxia   Skin: Skin is warm and dry. Capillary refill takes less than 2 seconds. He is not diaphoretic.   Psychiatric: He has a normal mood and affect. His behavior is normal.   Nursing note and vitals reviewed.      ED Course        EKG 12-lead, tracing only    Date/Time: 2019 3:36 PM  Performed by: Beatriz Ross APRN CNP  Authorized by: Beatriz Ross APRN CNP   Comparison: compared with previous ECG from 2018  Comparison to previous ECbpm occasional ectopic beat and old anteroseptal infarct  Rhythm: sinus rhythm  Rate: normal  QRS axis: normal  Conduction: conduction normal  ST Segments: ST segments normal  T Waves: T waves normal  Clinical impression: abnormal ECG  Comments: Old anteroseptal infarct                     Critical Care time:  none               Results for orders placed or performed during the hospital encounter of 19 (from the past 24 hour(s))   CBC with platelets differential   Result Value Ref Range    WBC 7.0 4.0 - 11.0 10e9/L    RBC Count 4.85 4.4 - 5.9 10e12/L    Hemoglobin 14.4 13.3 - 17.7 g/dL    Hematocrit 41.7 40.0 - 53.0 %    MCV 86 78 - 100 fl    MCH 29.7 26.5 - 33.0 pg    MCHC  34.5 31.5 - 36.5 g/dL    RDW 12.5 10.0 - 15.0 %    Platelet Count 202 150 - 450 10e9/L    Diff Method Automated Method     % Neutrophils 59.6 %    % Lymphocytes 26.9 %    % Monocytes 9.9 %    % Eosinophils 3.1 %    % Basophils 0.4 %    % Immature Granulocytes 0.1 %    Nucleated RBCs 0 0 /100    Absolute Neutrophil 4.2 1.6 - 8.3 10e9/L    Absolute Lymphocytes 1.9 0.8 - 5.3 10e9/L    Absolute Monocytes 0.7 0.0 - 1.3 10e9/L    Absolute Basophils 0.0 0.0 - 0.2 10e9/L    Abs Immature Granulocytes 0.0 0 - 0.4 10e9/L    Absolute Nucleated RBC 0.0    Comprehensive metabolic panel   Result Value Ref Range    Sodium 132 (L) 133 - 144 mmol/L    Potassium 3.9 3.4 - 5.3 mmol/L    Chloride 98 94 - 109 mmol/L    Carbon Dioxide 27 20 - 32 mmol/L    Anion Gap 7 3 - 14 mmol/L    Glucose 91 70 - 99 mg/dL    Urea Nitrogen 7 7 - 30 mg/dL    Creatinine 0.90 0.66 - 1.25 mg/dL    GFR Estimate 78 >60 mL/min/[1.73_m2]    GFR Estimate If Black >90 >60 mL/min/[1.73_m2]    Calcium 8.3 (L) 8.5 - 10.1 mg/dL    Bilirubin Total 0.8 0.2 - 1.3 mg/dL    Albumin 3.5 3.4 - 5.0 g/dL    Protein Total 7.2 6.8 - 8.8 g/dL    Alkaline Phosphatase 77 40 - 150 U/L    ALT 14 0 - 70 U/L    AST 13 0 - 45 U/L   Magnesium   Result Value Ref Range    Magnesium 1.7 1.6 - 2.3 mg/dL   Troponin I (now)   Result Value Ref Range    Troponin I ES 0.018 0.000 - 0.045 ug/L   Partial thromboplastin time   Result Value Ref Range    PTT 30 22 - 37 sec   INR   Result Value Ref Range    INR 1.07 0.86 - 1.14   Head CT w/o contrast    Narrative    CT SCAN OF THE HEAD WITHOUT CONTRAST   2/17/2019 3:04 PM     HISTORY: diziness and near syncope last night    TECHNIQUE:  Axial images of the head and coronal reformations without  IV contrast material. Radiation dose for this scan was reduced using  automated exposure control, adjustment of the mA and/or kV according  to patient size, or iterative reconstruction technique.    COMPARISON: MR scan dated 6/26/2018    FINDINGS:  There is  diffuse parenchymal volume loss.  White matter  changes are present in the cerebral hemispheres that are consistent  with small vessel ischemic disease in this age patient. Small chronic  lacunar type infarcts are seen in the right and left thalamus. These  were present on the previous MR scan. There is no evidence of  intracranial hemorrhage, mass, acute infarct or anomaly. The  visualized portions of the sinuses and mastoids appear normal. There  is no evidence of trauma.      Impression    IMPRESSION:  1. No acute pathology. No bleed, mass, or areas of acute infarction  are identified.  2. Small chronic lacunar type infarcts are seen in both the right and  left thalamus.     Chest XR,  PA & LAT    Narrative    XR CHEST 2 VW   2/17/2019 3:05 PM     HISTORY: near syncope    COMPARISON: Film dated 7/16/2018    FINDINGS: The heart is negative.  The lungs are clear. The pulmonary  vasculature is normal.  The bones and soft tissues are unremarkable.      Impression    IMPRESSION: No active infiltrates. No significant change.           Medications   0.9% sodium chloride BOLUS (0 mLs Intravenous Stopped 2/17/19 1530)     Followed by   sodium chloride 0.9% infusion (not administered)       Assessments & Plan (with Medical Decision Making)  1538: Hemodynamically stable. Asymptomatic during stay and neurologically intact. Stable EKG and troponin. Discussed with patient CT report which is unchanged from comparisson with previous MR.  Suspect chronic vertigo. Instructed to take Meclizine as needed and follow up with PCP for eval in 3 days. Recommend considering a different ENT provider for possible PT regarding chronic vertigo.  Come back to the Er for further eval if worsening symptoms or other acute concerns.      I have reviewed the nursing notes.    I have reviewed the findings, diagnosis, plan and need for follow up with the patient.          Medication List      ASK your doctor about these medications    nystatin [id number]  UNIT/ML suspension  Commonly known as:  MYCOSTATIN  500,000 Units, Oral, 4 TIMES DAILY  Ask about: Should I take this medication?            Final diagnoses:   Vertigo       2/17/2019   [facility] EMERGENCY DEPARTMENT     Beatriz Ross APRN CNP  02/17/19 1536

## 2019-02-17 NOTE — DISCHARGE INSTRUCTIONS
Your evaluation today was unremarkable for acute causes of dizziness. Suspect vertigo.  Take Meclizine as needed and follow up with Roberta Beaver in 3 days fr continued evaluation.

## 2019-02-17 NOTE — ED TRIAGE NOTES
Last night developed sudden pain shooting down the back of his right leg worse from the knee down.  Does get dizzy with the pain.

## 2019-02-17 NOTE — ED AVS SNAPSHOT
[facility] Emergency Department  [address] [place] [address]  Phone:  [phone]  Fax:  [phone]                                    Jerry Rodriguez   MRN: [id number]    Department:  [facility] Emergency Department   Date of Visit:  2/17/2019           After Visit Summary Signature Page    I have received my discharge instructions, and my questions have been answered. I have discussed any challenges I see with this plan with the nurse or doctor.    ..........................................................................................................................................  Patient/Patient Representative Signature      ..........................................................................................................................................  Patient Representative Print Name and Relationship to Patient    ..................................................               ................................................  Date                                   Time    ..........................................................................................................................................  Reviewed by Signature/Title    ...................................................              ..............................................  Date                                               Time          22EPIC Rev 08/18

## 2019-02-22 ENCOUNTER — TELEPHONE (OUTPATIENT)
Dept: FAMILY MEDICINE | Facility: OTHER | Age: 84
End: 2019-02-22

## 2019-02-22 NOTE — TELEPHONE ENCOUNTER
Called and spoke with patient regarding message below.  Patient will find a ride to an appointment and call back to schedule.  Brisa Steel CMA ([facility])

## 2019-02-24 DIAGNOSIS — R05.9 COUGH: ICD-10-CM

## 2019-02-25 RX ORDER — ALBUTEROL SULFATE 90 UG/1
AEROSOL, METERED RESPIRATORY (INHALATION)
Qty: 18 G | Refills: 1 | Status: SHIPPED | OUTPATIENT
Start: 2019-02-25 | End: 2019-08-27

## 2019-02-25 NOTE — TELEPHONE ENCOUNTER
V[place]olin:  Prescription approved per [facility] Refill Protocol.    Samantha Burgess, RN, BSN

## 2019-03-01 NOTE — PROGRESS NOTES
"[address]  [facility] [address]  [phone]  Dept: [phone]    PRE-OP EVALUATION:  Today's date: 3/6/2019    Jerry Rodriguez (: 1935) presents for pre-operative evaluation assessment as requested by  ***.  He requires evaluation and anesthesia risk assessment prior to undergoing surgery/procedure for treatment of *** .    Proposed Surgery/ Procedure: ***  Date of Surgery/ Procedure: ***  Time of Surgery/ Procedure: ***  Hospital/Surgical Facility: ***  {SURGERY FAX NUMBER:[id number]::\"Fax number for surgical facility: ***\"}  Primary Physician: Roberta Beaver  Type of Anesthesia Anticipated: {ANESTHESIA:545135}    Patient has a Health Care Directive or Living Will:  {YES/NO:246683::\"NO\"}    1. NO - Do you have a history of heart attack, stroke, stent, bypass or surgery on an artery in the head, neck, heart or legs?  2. NO - Do you ever have any pain or discomfort in your chest?  3. NO - Do you have a history of  Heart Failure?  4. NO - Are you troubled by shortness of breath when: walking on the level, up a slight hill or at night?  5. NO - Do you currently have a cold, bronchitis or other respiratory infection?  6. NO - Do you have a cough, shortness of breath or wheezing?  7. NO - Do you sometimes get pains in the calves of your legs when you walk?  8. NO - Do you or anyone in your family have previous history of blood clots?  9. NO - Do you or does anyone in your family have a serious bleeding problem such as prolonged bleeding following surgeries or cuts?  10. NO - Have you ever had problems with anemia or been told to take iron pills?  11. NO - Have you had any abnormal blood loss such as black, tarry or bloody stools, or abnormal vaginal bleeding?  12. NO - Have you ever had a blood transfusion?  13. NO - Have you or any of your relatives ever had problems with anesthesia?  14. NO - Do you have sleep apnea, excessive snoring or daytime " drowsiness?  15. NO - Do you have any prosthetic heart valves?  16. NO - Do you have prosthetic joints?  17. NO - Is there any chance that you may be pregnant?      HPI:     HPI related to upcoming procedure: ***      {. Problems:886376}    MEDICAL HISTORY:     Patient Active Problem List    Diagnosis Date Noted     Simple chronic bronchitis (H) 08/10/2017     Priority: Medium     Lumbosacral neuritis - moderate at L4/5 08/01/2014     Priority: Medium     Multilevel.  Moderate at L4/5.  MRI 7/2014       Degeneration of lumbar intervertebral disc 08/01/2014     Priority: Medium     HTN, goal below 140/90 03/31/2014     Priority: Medium     Vertigo 03/31/2014     Priority: Medium     Adenomatous polyp of colon 03/31/2014     Priority: Medium     Bullous dermatitis 08/13/2013     Priority: Medium     NOEMI positive 02/20/2013     Priority: Medium     Hammer toe 02/06/2013     Priority: Medium     Corn 02/06/2013     Priority: Medium     Degenerative arthritis of finger 05/23/2011     Priority: Medium     (Problem list name updated by automated process. Provider to review and confirm.)       Hyponatremia 01/13/2011     Priority: Medium     Tobacco use disorder 02/13/2009     Priority: Medium     HTN (hypertension) 01/28/2009     Priority: Medium     Anxiety state      Priority: Medium     Problem list name updated by automated process. Provider to review       Stricture and stenosis of esophagus 08/02/2004     Priority: Medium     Primary localized osteoarthrosis of shoulder region 01/07/2004     Priority: Medium     Problem list name updated by automated process. Provider to review       Chronic rhinitis 04/05/2002     Priority: Medium     Generalized osteoarthrosis, unspecified site 04/05/2002     Priority: Medium     Other atopic dermatitis and related conditions      Priority: Medium      Past Medical History:   Diagnosis Date     Anxiety state, unspecified      Arthritis      Hypertension      Hyponatremia       Inguinal hernia with obstruction, without mention of gangrene, unilateral or unspecified, (not specified as recurrent)     Strangulated right inguinal hernia years ago     Inguinal hernia without mention of obstruction or gangrene, unilateral or unspecified, (not specified as recurrent) 11/07/2000    Left inguinal hernia, sliding and not incarcerated     Other joint derangement, not elsewhere classified, forearm 1980    traumatic fracture     Pathologic fracture of neck of femur (H)     Surgery for hip fracture     Stricture and stenosis of esophagus 2004     Past Surgical History:   Procedure Laterality Date     C OSTEOTOMY HIP/FEMUR  1980    traumatic fracture right hip     COLONOSCOPY  10/17/2007    Repeat Colonoscopy in 5 years for surveillance.     COLONOSCOPY N/A 1/24/2018    Procedure: COLONOSCOPY;;  Surgeon: Don Arambula MD;  Location: [facility]     ESOPHAGOSCOPY, GASTROSCOPY, DUODENOSCOPY (EGD), COMBINED  1/4/2011    COMBINED ESOPHAGOSCOPY, GASTROSCOPY, DUODENOSCOPY (EGD), ESOPHAGEAL DILATION BALLOON LESS THAN 30MM performed by ELEN BUSBY at  GI     ESOPHAGOSCOPY, GASTROSCOPY, DUODENOSCOPY (EGD), COMBINED N/A 9/25/2015    Procedure: COMBINED ESOPHAGOSCOPY, GASTROSCOPY, DUODENOSCOPY (EGD), BIOPSY SINGLE OR MULTIPLE;  Surgeon: Markell Lawson MD;  Location:  GI     ESOPHAGOSCOPY, GASTROSCOPY, DUODENOSCOPY (EGD), COMBINED N/A 1/24/2018    Procedure: COMBINED ESOPHAGOSCOPY, GASTROSCOPY, DUODENOSCOPY (EGD), BIOPSY SINGLE OR MULTIPLE;;  Surgeon: Don Arambula MD;  Location: [facility]     ESOPHAGOSCOPY, GASTROSCOPY, DUODENOSCOPY (EGD), DILATATION, COMBINED N/A 1/24/2018    Procedure: COMBINED ESOPHAGOSCOPY, GASTROSCOPY, DUODENOSCOPY (EGD), DILATATION;  Esophagogastroduodenoscopy with Dilatation and Biopsy by Biopsy, Colonoscopy;  Surgeon: Don Arambula MD;  Location: [facility]     HC COLONOSCOPY THRU STOMA, DIAGNOSTIC  11/15/2000    Recurrent abdominal pain     HC COLONOSCOPY W/WO BRUSH/WASH   07/19/2004     HC REPAIR INCISIONAL HERNIA,REDUCIBLE  11/10/2000    Hernia Repair, Incisional, Unilateral, Left inguinal hernia and umbilical hernia     HC UGI ENDOSCOPY DIAG W OR W/O BRUSH/WASH  07/19/2004      UGI ENDOSCOPY DIAG W OR W/O BRUSH/WASH  05/24/2005    Peptic stricture of esophagus, dilated to 18 mm. Erosive reflux esophagitis. Hiatal hernia. Erosive duodenitis.      UGI ENDOSCOPY, SIMPLE EXAM  02/20/07      UGI ENDOSCOPY, SIMPLE EXAM  12/02/08    Schiatzky's ring, dialated, PPI indefinitely      UGI ENDOSCOPY, SIMPLE EXAM  12/15/2009    benign stricture with dialation     LAPAROSCOPIC APPENDECTOMY  09/15/2005     Current Outpatient Medications   Medication Sig Dispense Refill     amLODIPine (NORVASC) 5 MG tablet TAKE ONE TABLET BY MOUTH ONE TIME DAILY  90 tablet 3     ASPIRIN 81 MG OR TABS ONE DAILY       busPIRone (BUSPAR) 5 MG tablet TAKE ONE TABLET BY MOUTH TWICE DAILY  180 tablet 2     cetirizine (ZYRTEC) 10 MG tablet Take 1 tablet (10 mg) by mouth every evening 30 tablet 9     fluticasone (FLONASE) 50 MCG/ACT spray Spray 1-2 sprays into both nostrils daily 1 Bottle 11     Fluticasone Propionate, Inhal, (FLOVENT DISKUS IN)        Fluticasone-Salmeterol (AIRDUO RESPICLICK 232/14) 232-14 MCG/ACT AEPB Inhale 1 Device into the lungs 2 times daily 1 each 3     Ibuprofen (ADVIL PO) Take 400 mg by mouth       meclizine (ANTIVERT) 25 MG tablet Take 1 tablet (25 mg) by mouth 3 times daily as needed for dizziness 30 tablet 12     multivitamin, therapeutic with minerals (MULTI-VITAMIN) TABS Take 1 tablet by mouth daily       omeprazole (PRILOSEC) 20 MG DR capsule Take 1 capsule (20 mg) by mouth 2 times daily 60 capsule 0     Spacer/Aero-Holding Chambers (VALVED HOLDING CHAMBER) JULISSA 1 Device 2 times daily To be used with flovent 1 each 1     timolol (TIMOPTIC) 0.5 % ophthalmic solution Apply 1 drop to eye 2 times daily       TIMOLOL 0.25 % OP SOLN Reported on 3/29/2017  0     VENTOLIN  (90  "Base) MCG/ACT inhaler INHALE TWO PUFFS BY MOUTH EVERY SIX HOURS AS NEEDED FOR SHORTNESS OF BREATH OR WHEEZING 18 g 1     OTC products: {OTC ANALGESICS:034364}    Allergies   Allergen Reactions     No Known Drug Allergies       Latex Allergy: {YES/NO WITH DEFAULT:716744::\"NO\"}    Social History     Tobacco Use     Smoking status: Current Every Day Smoker     Packs/day: 1.00     Years: 52.00     Pack years: 52.00     Types: Cigarettes     Smokeless tobacco: Current User     Types: Chew     Tobacco comment: occasionally/ 1.5tin qwk   Substance Use Topics     Alcohol use: No     Comment: quit 7-1980     History   Drug Use No       REVIEW OF SYSTEMS:   {ROS Preop Choices:266051}    EXAM:   There were no vitals taken for this visit.  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    REVISED CARDIAC RISK INDEX  The patient has the following serious cardiovascular risks for perioperative complications such as (MI, PE, VFib and 3  AV Block):  {PREOP REVISED CARDIAC INDEX (RCI):983566:p:\"No serious cardiac risks\"}  INTERPRETATION: {REVISED CARDIAC RISK INTERPRETATION:521829}    The patient has the following additional risks for perioperative complications:  {Additional perioperative risks:109937:p:\"No identified additional risks\"}      ICD-10-CM    1. Need for prophylactic vaccination and inoculation against influenza Z23    2. Preop " "general physical exam Z01.818        RECOMMENDATIONS:       Pulmonary Risk  {Pulmonary Risk:896314::\"Incentive spirometry post op\",\"Respiratory Therapy (Respiratory Care IP Consult)  post op\",\"NG tube decompression if abdominal distension or significant vomiting \"}      --Patient is to take all scheduled medications on the day of surgery EXCEPT for modifications listed below.    {IMPORTANT - Approval:972290:p:\"APPROVAL GIVEN to proceed with proposed procedure, without further diagnostic evaluation\"}       Signed Electronically by: Roberta Beaver MD, MD    Copy of this evaluation report is provided to requesting physician.    [facility] Preop Guidelines    Revised Cardiac Risk Index  "

## 2019-03-06 ENCOUNTER — OFFICE VISIT (OUTPATIENT)
Dept: FAMILY MEDICINE | Facility: OTHER | Age: 84
End: 2019-03-06
Payer: COMMERCIAL

## 2019-03-06 VITALS
RESPIRATION RATE: 24 BRPM | WEIGHT: 194 LBS | HEIGHT: 71 IN | DIASTOLIC BLOOD PRESSURE: 78 MMHG | OXYGEN SATURATION: 94 % | HEART RATE: 78 BPM | BODY MASS INDEX: 27.16 KG/M2 | TEMPERATURE: 98 F | SYSTOLIC BLOOD PRESSURE: 110 MMHG

## 2019-03-06 DIAGNOSIS — J41.0 SIMPLE CHRONIC BRONCHITIS (H): ICD-10-CM

## 2019-03-06 DIAGNOSIS — I10 HTN, GOAL BELOW 140/90: ICD-10-CM

## 2019-03-06 DIAGNOSIS — F17.200 TOBACCO USE DISORDER: ICD-10-CM

## 2019-03-06 DIAGNOSIS — R42 VERTIGO: Primary | ICD-10-CM

## 2019-03-06 DIAGNOSIS — Z23 NEED FOR PROPHYLACTIC VACCINATION AND INOCULATION AGAINST INFLUENZA: ICD-10-CM

## 2019-03-06 PROCEDURE — 99214 OFFICE O/P EST MOD 30 MIN: CPT | Performed by: FAMILY MEDICINE

## 2019-03-06 RX ORDER — MECLIZINE HYDROCHLORIDE 25 MG/1
25 TABLET ORAL 3 TIMES DAILY PRN
Qty: 30 TABLET | Refills: 12 | Status: SHIPPED | OUTPATIENT
Start: 2019-03-06 | End: 2019-06-19

## 2019-03-06 ASSESSMENT — MIFFLIN-ST. JEOR: SCORE: 1584.17

## 2019-03-06 NOTE — PROGRESS NOTES
"  SUBJECTIVE:   Jerry Rodriguez is a 84 year old male who presents to clinic today for the following health issues:        ED/UC Followup:    Facility: [facility]  Date of visit: 2-  Reason for visit: Vertigo  Current Status: Still C/O vertigo and itching in his ear. Patient states that he gets dizzy about 3 times a day. Especially when bending down.        Problem list and histories reviewed & adjusted, as indicated.  Additional history: as documented    Dizziness      Duration: one month    Description   Feeling faint:  no   Feeling like the surroundings are moving: YES  Loss of consciousness or falls: no     Intensity:  moderate    Accompanying signs and symptoms:   Nausea/vomitting: YES  Palpitations: no   Weakness in arms or legs: no   Vision or speech changes: YES  Ringing in ears (Tinnitus): no   Hearing loss related to dizziness: YES  Other (fevers/chills/sweating/dyspnea): no     History (similar episodes/head trauma/previous evaluation/recent bleeding): None    Precipitating or alleviating factors (new meds/chemicals): None  Worse with activity/head movement: YES    Therapies tried and outcome: Meclizine       Labs reviewed in EPIC    ROS:  CONSTITUTIONAL: NEGATIVE for fever, chills, change in weight  ENT/MOUTH: NEGATIVE for ear, mouth and throat problems  RESP: NEGATIVE for significant cough or SOB  CV: NEGATIVE for chest pain, palpitations or peripheral edema  NEURO: dizziness    OBJECTIVE:                                                    /78 (BP Location: Left arm, Patient Position: Chair, Cuff Size: Adult Large)   Pulse 78   Temp 98  F (36.7  C) (Temporal)   Resp 24   Ht 1.791 m (5' 10.5\")   Wt 88 kg (194 lb)   SpO2 94%   BMI 27.44 kg/m    Body mass index is 27.44 kg/m .   GENERAL: healthy, alert, well nourished, well hydrated, no distress  RESP: lungs clear to auscultation - no rales, no rhonchi, no wheezes  CV: regular rates and rhythm, normal S1 S2, no S3 or S4 and " no murmur, no click or rub -  NEURO: [place]-Hallpike positive to the R today. States feels dizzy to L as well, but no nystagmus and mild symptoms.         ASSESSMENT/PLAN:                                                        ICD-10-CM    1. Vertigo R42 meclizine (ANTIVERT) 25 MG tablet     PHYSICAL THERAPY REFERRAL   2. HTN, goal below 140/90 I10    3. Tobacco use disorder F17.200    4. Simple chronic bronchitis (H) J41.0    5. Need for prophylactic vaccination and inoculation against influenza Z23        Dizziness, previously thought due to hyponatremia. But has improved on this and still had dizziness issue. But this time appears to be rotational. Today confirmed that he is having L sided rotational symptoms c/w BPPV. Will plan PT for this. Has ride to [place], so will try to arrange there.   Discussed with low BP, if we should decrease BP meds, but has not been consistently low yet. Will wait.    Roberta Beaver MD, MD  [facility]

## 2019-03-26 ENCOUNTER — HOSPITAL ENCOUNTER (OUTPATIENT)
Dept: PHYSICAL THERAPY | Facility: CLINIC | Age: 84
Setting detail: THERAPIES SERIES
End: 2019-03-26
Attending: FAMILY MEDICINE
Payer: COMMERCIAL

## 2019-03-26 DIAGNOSIS — R42 VERTIGO: ICD-10-CM

## 2019-03-26 PROCEDURE — 97162 PT EVAL MOD COMPLEX 30 MIN: CPT | Mod: GP | Performed by: PHYSICAL THERAPIST

## 2019-03-27 NOTE — PROGRESS NOTES
[facility]        OUTPATIENT PHYSICAL THERAPY FUNCTIONAL EVALUATION  PLAN OF TREATMENT FOR OUTPATIENT REHABILITATION  (COMPLETE FOR INITIAL CLAIMS ONLY)  Patient's Last Name, First Name, M.I.  YOB: 1935  Jerry Rodriguez     Provider's Name   [facility]   Medical Record No.  [id number]     Start of Care Date:  03/26/19   Onset Date:  02/17/19   Type:     _X__PT   ____OT  ____SLP Medical Diagnosis:  vertigo     PT Diagnosis:  dizziness, imbalance, decreased cervical mobility Visits from SOC:  1                              __________________________________________________________________________________  Plan of Treatment: neuromuscular re-education, ROM, strengthening, stretching, manual therapy, canalith repositioning as needed, pt education        Functional Goals:  1  Pt will report a 40% or greater improvement in dizziness sx with general mobility like position changes and looking overhead.  05/24/19    2  Pt will be free of falls both in and out of the home.  05/24/19    Therapy Frequency:  1 time/week     Predicted Duration of Therapy Intervention:  60 days    Anne-Marie Art, PT                                    I CERTIFY THE NEED FOR THESE SERVICES FURNISHED UNDER        THIS PLAN OF TREATMENT AND WHILE UNDER MY CARE     (Physician co-signature of this document indicates review and certification of the therapy plan).                Certification Date From:  03/26/19   Certification Date To:  05/24/19    Referring Provider:  Dr. Roberta Beaver    Initial Assessment  See Epic Evaluation- Start of Care Date: 03/26/19

## 2019-03-27 NOTE — PROGRESS NOTES
" 03/26/19 1445   Quick Adds   Quick Adds Certification;Vestibular Eval   Type of Visit Initial OP PT Evaluation   General Information   Start of Care Date 03/26/19   Referring Physician Dr. Roberta Beaver   Orders Evaluate and Treat as Indicated   Additional Orders Balance/Vestibular Program   Order Date 03/06/19   Medical Diagnosis vertigo   Onset of illness/injury or Date of Surgery 02/17/19   Precautions/Limitations fall precautions   Surgical/Medical history reviewed Yes   Pertinent history of current problem (include personal factors and/or comorbidities that impact the POC) Pt reports vague c/o dizziness with primary complaint that R > L ears itch.  He also feels itchy by his eyes.  He reports 0/10 sx at rest.  States he can get dizzy when he is stressed and \"worked up\", if moves too fast, sometimes with standing up and sometimes when looking up.  He tends to sit on the side of the bed for awhile when he gets up but doesn't feel dizzy when he moves in bed or when he gets OOB.  He occasionally uses a walker or a cane - not consistently.  He endorses at least 2 falls but doesn't give many specifics when asked.  His L > R knees bother him.  He denies neck pain but states that it cracks a lot, sometimes \"feels better\" when he cracks it yet states it doesn't hurt at baseline.  He has seen a chiro but doesn't give details as to when this was.  He has not been driving and wants to know when he can drive.  Friends/family have been doing the driving right now. He states his house is very busy right now as people are putting in a handicapped BR for him and his wife who has PD.  When asked, he does describe a spinning sensation when in bed but does not know when it was, it has not been recently.  Per EPIC, pt has had chronic vertigo sx and has had some imaging done related to dizziness complaints.  No acute changes have been noted on imaging in the past.  Dizziness is brief, lasts for seconds.  Denies headaches.  Is [place], " denies visual sx initially but later states things can be blurry at times.   Patient role/Employment history Retired   Living environment [place]   Patient/Family Goals Statement to drive again   Fall Risk Screen   Fall screen completed by PT   Have you fallen 2 or more times in the past year? Yes   Have you fallen and had an injury in the past year? No   Timed Up and Go score (seconds) 16.47   Is patient a fall risk? Yes;Department fall risk interventions implemented   System Outcome Measures   Outcome Measures BPPV   Dizziness Handicap Inventory (score out of 100) A decrease in score by 17.18 or greater indicates a clinically significant change in symptoms. 16   Vitals Signs   Vital Signs Comments did not test for orthostatic hypotension today but has been tested for it in clinic and was (-)   Cognitive Status Examination   Orientation orientation to person, place and time   Level of Consciousness alert   Follows Commands and Answers Questions 75% of the time   Personal Safety and Judgment intact   Memory impaired   Cognitive Comment appears to have cognitive deficits, poor historian   Observation   Observation no acute distress, arrived via neighbor, using SEC   Integumentary   Integumentary Comments brown stained fingers/nails - hx of smoking   Posture   Posture Kyphosis   Posture Comments forward head   Bed Mobility   Bed Mobility Comments min A for sit to supine, moves very stiffly   Transfer Skills   Transfer Comments labored but independent   Gait   Gait Comments use of SEC on the R side, swing-through phase, decreased jose, slight instability   Balance   Balance Comments good static sitting balance   Coordination   Coordination Comments 2/5 correct for finger to nose B, heel to shin grossly intact, intact DARREN   Muscle Tone   Muscle Tone Comments WNL for age   Cervicogenic Screen   Neck ROM 20 deg extension, flex WNL, 40 deg rot B, very little SB B - upper cervical is limited   Vertebral Artery  Test Comments vague dizziness reported with B testing upon return to sit, able to maintain the correct counting backwards   Oculomotor Exam   Smooth Pursuit Abnormal  (variety of under and overshoots without pattern)   Saccades Comments unable to move quickly, not sure if understood directions   Infrared Goggle Exam or Frenzel Lense Exam   Vestibular Suppressant in Last 24 Hours? Yes  (Meclizine this am)   Exam completed with Infrared Goggles   Spontaneous Nystagmus Negative   Positional Testing comments (-) for nystagmus and vertigo sx with roll test to either side and for sidelying test to either side, due to kyphosis and difficulty with bed mobility, completed sidelying test   Planned Therapy Interventions   Planned Therapy Interventions neuromuscular re-education;ROM;strengthening;stretching;manual therapy   Planned Therapy Interventions Comment canalith repositioning as needed, pt education   Clinical Impression   Criteria for Skilled Therapeutic Interventions Met yes, treatment indicated   PT Diagnosis dizziness, imbalance, decreased cervical mobility   Influenced by the following impairments dizziness, imbalance   Functional limitations due to impairments walking, ADLs, driving   Clinical Presentation Evolving/Changing   Clinical Presentation Rationale History: chronic sx, cognition, anxiety hx; Examination: activity and participation restrictions   Clinical Decision Making (Complexity) Moderate complexity   Therapy Frequency 1 time/week   Predicted Duration of Therapy Intervention (days/wks) 60 days   Risk & Benefits of therapy have been explained Yes   Patient, Family & other staff in agreement with plan of care Yes   Clinical Impression Comments Pt was (-) for BPPV today.  Chart review reveals chronic vertigo sx.  It is possible pt has had self-limiting BPPV but other considerations include vascular compromise/central cause, anxiety and cervicogenic causes.  Plan to follow up with pt to reassess for  vertigo and address cervical spine and balance as needed if he remains (-) for BPPV.  Pt was not a good historian and appears to have some cognitive deficits.   Education Assessment   Preferred Learning Style Listening;Reading;Demonstration;Pictures/video   Barriers to Learning No barriers   GOALS   PT Eval Goals 1;2   Goal 1   Goal Identifier 1   Goal Description Pt will report a 40% or greater improvement in dizziness sx with general mobility like position changes and looking overhead.   Target Date 05/24/19   Goal 2   Goal Identifier 2   Goal Description Pt will be free of falls both in and out of the home.   Target Date 05/24/19   Total Evaluation Time   PT Eval, Moderate Complexity Minutes (15855) 55   Therapy Certification   Certification date from 03/26/19   Certification date to 05/24/19   Medical Diagnosis vertigo   Certification I certify the need for these services furnished under this plan of treatment and while under my care.  (Physician co-signature of this document indicates review and certification of the therapy plan).         Please see above as a discharge summary.  Pt called to cancel his next appt as he did not feel he needed more PT.

## 2019-04-23 DIAGNOSIS — K21.9 GASTROESOPHAGEAL REFLUX DISEASE WITHOUT ESOPHAGITIS: ICD-10-CM

## 2019-04-29 NOTE — ADDENDUM NOTE
Encounter addended by: Anne-Marie Art, PT on: 4/29/2019 1:15 PM   Actions taken: Episode resolved, Pend clinical note, Sign clinical note

## 2019-06-12 NOTE — PROGRESS NOTES
"SUBJECTIVE:   Jerry Rodriguez is a 84 year old male who presents for Preventive Visit.  Are you in the first 12 months of your Medicare coverage?  No    Healthy Habits:    In general, how would you rate your overall health?  Good    Frequency of exercise:  2-3 days/week    Duration of exercise:  30-45 minutes    Do you usually eat at least 4 servings of fruit and vegetables a day, include whole grains    & fiber and avoid regularly eating high fat or \"junk\" foods?  No    Taking medications regularly:  Yes    Barriers to taking medications:  None    Medication side effects:  None    Ability to successfully perform activities of daily living:  No assistance needed    Home Safety:  No safety concerns identified    Hearing Impairment:  No hearing concerns    In the past 6 months, have you been bothered by leaking of urine?  No    In general, how would you rate your overall mental or emotional health?  Very good      PHQ-2 Total Score:    Additional concerns today:  Yes    Do you feel safe in your environment? Yes    Do you have a Health Care Directive? Yes: Advance Directive has been received and scanned.    Fall risk  Fallen 2 or more times in the past year?: No  Any fall with injury in the past year?: No    Cognitive Screening   1) Repeat 3 items (Leader, Season, Table)    2) Clock draw: NORMAL  3) 3 item recall: Recalls 1 object   Results: NORMAL clock, 1-2 items recalled: COGNITIVE IMPAIRMENT LESS LIKELY    Mini-CogTM Copyright HOWARD Durham. Licensed by the author for use in [facility]; reprinted with permission (reg@.[facility]). All rights reserved.      Do you have sleep apnea, excessive snoring or daytime drowsiness?: no    Reviewed and updated as needed this visit by clinical staff  Tobacco  Allergies  Meds  Problems  Med Hx  Surg Hx  Fam Hx       Reviewed and updated as needed this visit by Provider  Tobacco  Allergies  Meds  Problems  Med Hx  Surg Hx  Fam Hx        Social History "     Tobacco Use     Smoking status: Current Every Day Smoker     Packs/day: 1.00     Years: 52.00     Pack years: 52.00     Types: Cigarettes     Smokeless tobacco: Current User     Types: Chew     Tobacco comment: occasionally/ 1.5tin qwk   Substance Use Topics     Alcohol use: No     Comment: quit 7-1980       Alcohol Use 3/31/2015   Prescreen: >3 drinks/day or >7 drinks/week? this patient does not drink at all     Concern - Muscle Pain/Restless legs    Onset: 1.5 years     Description:   Restless legs and cramping     Intensity: moderate    Progression of Symptoms:  same    Accompanying Signs & Symptoms:  Cramping     Previous history of similar problem:   Na    Precipitating factors:   Worsened by: At bedtime     Alleviating factors:  Improved by: Lotion   Therapies Tried and outcome: Patient states that he put lotion on and that helps     He only notices the cramping in his legs when he is laying down in bed. He does not have the cramping in his legs with walking, it is only when he is in bed.     Anxiety  He is taking Buspar twice daily. He thinks that his anxiety is well controlled. His wife states that is irritable if things do not go his way, but he is usually not anxious. He is still smoking, but is working on quitting, has to quit for an upcoming dental procedure.       JAIMEE-7 SCORE 4/6/2016 1/18/2018 6/19/2019   Total Score - 2 (minimal anxiety) -   Total Score 5 2 5       Current providers sharing in care for this patient include:   Patient Care Team:  Roberta Beaver MD as PCP - General (Family Practice)  Roberta Beaver MD as Assigned PCP    The following health maintenance items are reviewed in Epic and correct as of today:  Health Maintenance   Topic Date Due     ZOSTER IMMUNIZATION (1 of 2) 02/07/1985     MEDICARE ANNUAL WELLNESS VISIT  03/31/2016     PHQ-2  01/01/2019     INFLUENZA VACCINE (Season Ended) 09/01/2019     DTAP/TDAP/TD IMMUNIZATION (4 - Td) 02/04/2020     BMP  02/17/2020      "ADVANCE CARE PLANNING  07/27/2022     COLONOSCOPY  01/24/2023     LIPID  07/16/2023     SPIROMETRY  Completed     COPD ACTION PLAN  Completed     IPV IMMUNIZATION  Aged Out     MENINGITIS IMMUNIZATION  Aged Out     Lab work is in process  Labs reviewed in EPIC  Pneumonia Vaccine: Done    Review of Systems  Constitutional, HEENT, cardiovascular, pulmonary, GI, , musculoskeletal, neuro, skin, endocrine and psych systems are negative, except as in HPI or otherwise noted.     This document serves as a record of the services and decisions personally performed and made by Roberta Beaver MD. It was created on her behalf by Maggie Tristan, a trained medical scribe. The creation of this document is based the provider's statements to the medical scribe.  Maggie Tristan, June 19, 2019 2:03 PM     OBJECTIVE:   BP 98/52 (BP Location: Left arm, Patient Position: Chair, Cuff Size: Adult Regular)   Pulse 76   Temp 97.9  F (36.6  C) (Temporal)   Resp 24   Ht 1.765 m (5' 9.5\")   Wt 85.5 kg (188 lb 8 oz)   SpO2 97%   BMI 27.44 kg/m   Estimated body mass index is 27.44 kg/m  as calculated from the following:    Height as of this encounter: 1.765 m (5' 9.5\").    Weight as of this encounter: 85.5 kg (188 lb 8 oz).  Physical Exam  GENERAL: healthy, alert and no distress  EYES: Eyes grossly normal to inspection, PERRL and conjunctivae and sclerae normal  HENT: ear canals and TM's normal, nose and mouth without ulcers or lesions  NECK: no adenopathy, no asymmetry, masses, or scars and thyroid normal to palpation  RESP: lungs clear to auscultation - no rales, rhonchi or wheezes  CV: regular rate and rhythm, normal S1 S2, no S3 or S4, no murmur, click or rub, no peripheral edema and peripheral pulses strong. Only one varicose vein is seen on examination of the lower legs.   MS: no gross musculoskeletal defects noted, no edema  SKIN: no suspicious lesions or rashes  NEURO: Normal strength and tone, mentation intact and speech normal  PSYCH: " "mentation appears normal, affect normal/bright    Diagnostic Test Results:  Labs reviewed in Epic  No results found for this or any previous visit (from the past 24 hour(s)).    ASSESSMENT / PLAN:       ICD-10-CM    1. Encounter for Medicare annual wellness exam Z00.00    2. Anxiety state F41.1 busPIRone (BUSPAR) 5 MG tablet     OFFICE/OUTPT VISIT,EST,LEVL IV   3. Gastroesophageal reflux disease without esophagitis K21.9 omeprazole (PRILOSEC) 20 MG DR capsule     OFFICE/OUTPT VISIT,EST,LEVL IV   4. Leg cramps R25.2 Comprehensive metabolic panel (BMP + Alb, Alk Phos, ALT, AST, Total. Bili, TP)     Magnesium     OFFICE/OUTPT VISIT,EST,LEVL IV   5. Encounter for tobacco use cessation counseling Z71.6      Mood: He reports that his anxiety is well controlled on Buspar twice daily. Difficulty is more with irritability, discussed smoking cessation. Offered smoking cessation planning -- seemed tentatively interested and encourage wellbutrin and quit plan to help as he is facing a dental procedure next week he cannot smoke for about a week while it heals. Otherwise he is thinking about patches. Given quit plan info and will fill wellbutrin if he chooses.    GERD: Patient reports adequate control on this medication. Refills given.     Leg Cramps: Recommended increasing hydration, reducing smoking, improve nutrition. Will check labs for possible electrolyte imbalance that could be causing leg cramping.     End of Life Planning:  Patient currently has an advanced directive: Yes.  Practitioner is supportive of decision.    COUNSELING:  Reviewed preventive health counseling, as reflected in patient instructions       Healthy diet/nutrition    Estimated body mass index is 27.44 kg/m  as calculated from the following:    Height as of this encounter: 1.765 m (5' 9.5\").    Weight as of this encounter: 85.5 kg (188 lb 8 oz).     reports that he has been smoking cigarettes.  He has a 52.00 pack-year smoking history. His smokeless " tobacco use includes chew.  Tobacco Cessation Action Plan: Phone counseling: Place order for QuitPlan (Tobacco Cessation [facility] Referral 4986)  Pharmacotherapies : Zyban/Wellbutrin, Nicotine patch and other Nicotine replacement    Appropriate preventive services were discussed with this patient, including applicable screening as appropriate for cardiovascular disease, diabetes, osteopenia/osteoporosis, and glaucoma.  As appropriate for age/gender, discussed screening for colorectal cancer, prostate cancer, breast cancer, and cervical cancer. Checklist reviewing preventive services available has been given to the patient.    Reviewed patients plan of care and provided an AVS. The Basic Care Plan (routine screening as documented in Health Maintenance) for Jerry meets the Care Plan requirement. This Care Plan has been established and reviewed with the Patient and spouse.    Counseling Resources:  ATP IV Guidelines  Pooled Cohorts Equation Calculator  Breast Cancer Risk Calculator  FRAX Risk Assessment  ICSI Preventive Guidelines  Dietary Guidelines for Americans, 2010  USDA's MyPlate  ASA Prophylaxis  Lung CA Screening    The information in this document, created by the medical scribe for me, accurately reflects the services I personally performed and the decisions made by me. I have reviewed and approved this document for accuracy.     Roberta Beaver MD, MD  [facility]    Identified Health Risks:

## 2019-06-19 ENCOUNTER — OFFICE VISIT (OUTPATIENT)
Dept: FAMILY MEDICINE | Facility: OTHER | Age: 84
End: 2019-06-19
Payer: COMMERCIAL

## 2019-06-19 VITALS
WEIGHT: 188.5 LBS | HEART RATE: 76 BPM | DIASTOLIC BLOOD PRESSURE: 52 MMHG | SYSTOLIC BLOOD PRESSURE: 98 MMHG | OXYGEN SATURATION: 97 % | TEMPERATURE: 97.9 F | HEIGHT: 70 IN | RESPIRATION RATE: 24 BRPM | BODY MASS INDEX: 26.99 KG/M2

## 2019-06-19 DIAGNOSIS — K21.9 GASTROESOPHAGEAL REFLUX DISEASE WITHOUT ESOPHAGITIS: ICD-10-CM

## 2019-06-19 DIAGNOSIS — R25.2 LEG CRAMPS: ICD-10-CM

## 2019-06-19 DIAGNOSIS — Z71.6 ENCOUNTER FOR TOBACCO USE CESSATION COUNSELING: ICD-10-CM

## 2019-06-19 DIAGNOSIS — Z00.00 ENCOUNTER FOR MEDICARE ANNUAL WELLNESS EXAM: Primary | ICD-10-CM

## 2019-06-19 DIAGNOSIS — F41.1 ANXIETY STATE: ICD-10-CM

## 2019-06-19 LAB
ALBUMIN SERPL-MCNC: 3.6 G/DL (ref 3.4–5)
ALP SERPL-CCNC: 83 U/L (ref 40–150)
ALT SERPL W P-5'-P-CCNC: 17 U/L (ref 0–70)
ANION GAP SERPL CALCULATED.3IONS-SCNC: 9 MMOL/L (ref 3–14)
AST SERPL W P-5'-P-CCNC: 18 U/L (ref 0–45)
BILIRUB SERPL-MCNC: 0.7 MG/DL (ref 0.2–1.3)
BUN SERPL-MCNC: 7 MG/DL (ref 7–30)
CALCIUM SERPL-MCNC: 8.7 MG/DL (ref 8.5–10.1)
CHLORIDE SERPL-SCNC: 97 MMOL/L (ref 94–109)
CO2 SERPL-SCNC: 25 MMOL/L (ref 20–32)
CREAT SERPL-MCNC: 0.9 MG/DL (ref 0.66–1.25)
GFR SERPL CREATININE-BSD FRML MDRD: 78 ML/MIN/{1.73_M2}
GLUCOSE SERPL-MCNC: 101 MG/DL (ref 70–99)
MAGNESIUM SERPL-MCNC: 1.9 MG/DL (ref 1.6–2.3)
POTASSIUM SERPL-SCNC: 4.2 MMOL/L (ref 3.4–5.3)
PROT SERPL-MCNC: 7.2 G/DL (ref 6.8–8.8)
SODIUM SERPL-SCNC: 131 MMOL/L (ref 133–144)

## 2019-06-19 PROCEDURE — 36415 COLL VENOUS BLD VENIPUNCTURE: CPT | Performed by: FAMILY MEDICINE

## 2019-06-19 PROCEDURE — 80053 COMPREHEN METABOLIC PANEL: CPT | Performed by: FAMILY MEDICINE

## 2019-06-19 PROCEDURE — 99214 OFFICE O/P EST MOD 30 MIN: CPT | Mod: 25 | Performed by: FAMILY MEDICINE

## 2019-06-19 PROCEDURE — G0439 PPPS, SUBSEQ VISIT: HCPCS | Performed by: FAMILY MEDICINE

## 2019-06-19 PROCEDURE — 83735 ASSAY OF MAGNESIUM: CPT | Performed by: FAMILY MEDICINE

## 2019-06-19 RX ORDER — BUSPIRONE HYDROCHLORIDE 5 MG/1
5 TABLET ORAL 2 TIMES DAILY
Qty: 180 TABLET | Refills: 1 | Status: SHIPPED | OUTPATIENT
Start: 2019-06-19 | End: 2019-12-23

## 2019-06-19 ASSESSMENT — ANXIETY QUESTIONNAIRES
6. BECOMING EASILY ANNOYED OR IRRITABLE: SEVERAL DAYS
5. BEING SO RESTLESS THAT IT IS HARD TO SIT STILL: NEARLY EVERY DAY
7. FEELING AFRAID AS IF SOMETHING AWFUL MIGHT HAPPEN: NOT AT ALL
3. WORRYING TOO MUCH ABOUT DIFFERENT THINGS: NOT AT ALL
2. NOT BEING ABLE TO STOP OR CONTROL WORRYING: SEVERAL DAYS
1. FEELING NERVOUS, ANXIOUS, OR ON EDGE: NOT AT ALL
GAD7 TOTAL SCORE: 5
IF YOU CHECKED OFF ANY PROBLEMS ON THIS QUESTIONNAIRE, HOW DIFFICULT HAVE THESE PROBLEMS MADE IT FOR YOU TO DO YOUR WORK, TAKE CARE OF THINGS AT HOME, OR GET ALONG WITH OTHER PEOPLE: NOT DIFFICULT AT ALL

## 2019-06-19 ASSESSMENT — MIFFLIN-ST. JEOR: SCORE: 1543.34

## 2019-06-19 ASSESSMENT — ACTIVITIES OF DAILY LIVING (ADL): CURRENT_FUNCTION: NO ASSISTANCE NEEDED

## 2019-06-19 ASSESSMENT — PATIENT HEALTH QUESTIONNAIRE - PHQ9: 5. POOR APPETITE OR OVEREATING: NOT AT ALL

## 2019-06-19 NOTE — PROGRESS NOTES
The patient was counseled and encouraged to consider modifying their diet and eating habits. He was provided with information on recommended healthy diet options.

## 2019-06-20 ASSESSMENT — ANXIETY QUESTIONNAIRES: GAD7 TOTAL SCORE: 5

## 2019-06-20 NOTE — RESULT ENCOUNTER NOTE
Labs are stable. Biggest concern for any cramping may be his stable and slightly low sodium. He has had this for years and been just under normal with no specific work up to figure it out. I can repeat with urine labs to try to clarify, but his borderline high blood sugar and his discussion yesterday with his sweet tooth may be the indicator as it can go a little low with high blood sugars. He may find that being careful with his sugar increase may be helpful as well. Let me know if he wants to do more labwork to figure this out for sure.  Roberta Beaver MD

## 2019-07-02 NOTE — TELEPHONE ENCOUNTER
Patient doesn't feel that he needs to see an audiologist.   Patient did schedule to have a compression stocking discussion, due to leg swelling.   Abhinav Brown MA  July 30, 2018     What Type Of Note Output Would You Prefer (Optional)?: Bullet Format How Severe Is Your Skin Lesion?: moderate Has Your Skin Lesion Been Treated?: not been treated Is This A New Presentation, Or A Follow-Up?: Skin Lesion Additional History: Pain 0-10

## 2019-08-27 DIAGNOSIS — R05.9 COUGH: ICD-10-CM

## 2019-08-28 RX ORDER — ALBUTEROL SULFATE 90 UG/1
AEROSOL, METERED RESPIRATORY (INHALATION)
Qty: 18 G | Refills: 0 | Status: SHIPPED | OUTPATIENT
Start: 2019-08-28 | End: 2019-11-20

## 2019-08-28 NOTE — TELEPHONE ENCOUNTER
Pending Prescriptions:                       Disp   Refills    VENTOLIN  (90 Base) MCG/ACT inhale*18 g   0            Sig: INHALE TWO PUFFS BY MOUTH EVERY SIX HOURS AS           NEEDED FOR SHORTNESS OF BREATH or wheezing    Prescription approved per [facility] [place].    Fiorella Deshpande, RN, BSN

## 2019-09-20 NOTE — PROGRESS NOTES
Subjective     Jerry Rodriguez is a 84 year old male who presents to clinic today for the following health issues:    HPI   Acute Illness   Acute illness concerns: Sinus Issues, Cough   Onset: 4-5 days     Fever: no    Chills/Sweats: YES    Headache (location?): no    Sinus Pressure:YES    Conjunctivitis:  no    Ear Pain: no    Rhinorrhea: YES    Congestion: YES    Sore Throat: no     Cough: YES    Wheeze: YES    Decreased Appetite: no     Nausea: no    Vomiting: no    Diarrhea:  no    Dysuria/Freq.: no    Fatigue/Achiness: YES    Sick/Strep Exposure: no      Therapies Tried and outcome: Has not tried anything.     -------------------------------------    Patient Active Problem List   Diagnosis     Other atopic dermatitis and related conditions     Chronic rhinitis     Generalized osteoarthrosis, unspecified site     Primary localized osteoarthrosis of shoulder region     Stricture and stenosis of esophagus     Anxiety state     HTN (hypertension)     Tobacco use disorder     Hyponatremia     Degenerative arthritis of finger     Hammer toe     Corn     NOEMI positive     Bullous dermatitis     HTN, goal below 140/90     Vertigo     Adenomatous polyp of colon     Lumbosacral neuritis - moderate at L4/5     Degeneration of lumbar intervertebral disc     Simple chronic bronchitis (H)     Past Surgical History:   Procedure Laterality Date     C OSTEOTOMY HIP/FEMUR  1980    traumatic fracture right hip     COLONOSCOPY  10/17/2007    Repeat Colonoscopy in 5 years for surveillance.     COLONOSCOPY N/A 1/24/2018    Procedure: COLONOSCOPY;;  Surgeon: Don Arambula MD;  Location:  GI     ESOPHAGOSCOPY, GASTROSCOPY, DUODENOSCOPY (EGD), COMBINED  1/4/2011    COMBINED ESOPHAGOSCOPY, GASTROSCOPY, DUODENOSCOPY (EGD), ESOPHAGEAL DILATION BALLOON LESS THAN 30MM performed by ELEN BUSBY at  GI     ESOPHAGOSCOPY, GASTROSCOPY, DUODENOSCOPY (EGD), COMBINED N/A 9/25/2015    Procedure: COMBINED ESOPHAGOSCOPY, GASTROSCOPY,  DUODENOSCOPY (EGD), BIOPSY SINGLE OR MULTIPLE;  Surgeon: Markell Lawson MD;  Location[facility]     ESOPHAGOSCOPY, GASTROSCOPY, DUODENOSCOPY (EGD), COMBINED N/A 1/24/2018    Procedure: COMBINED ESOPHAGOSCOPY, GASTROSCOPY, DUODENOSCOPY (EGD), BIOPSY SINGLE OR MULTIPLE;;  Surgeon: Don Arambula MD;  [facility]     ESOPHAGOSCOPY, GASTROSCOPY, DUODENOSCOPY (EGD), DILATATION, COMBINED N/A 1/24/2018    Procedure: COMBINED ESOPHAGOSCOPY, GASTROSCOPY, DUODENOSCOPY (EGD), DILATATION;  Esophagogastroduodenoscopy with Dilatation and Biopsy by Biopsy, Colonoscopy;  Surgeon: Don Arambula MD;  Location: [facility]     HC COLONOSCOPY THRU STOMA, DIAGNOSTIC  11/15/2000    Recurrent abdominal pain     HC COLONOSCOPY W/WO BRUSH/WASH  07/19/2004     HC REPAIR INCISIONAL HERNIA,REDUCIBLE  11/10/2000    Hernia Repair, Incisional, Unilateral, Left inguinal hernia and umbilical hernia     HC UGI ENDOSCOPY DIAG W OR W/O BRUSH/WASH  07/19/2004     HC UGI ENDOSCOPY DIAG W OR W/O BRUSH/WASH  05/24/2005    Peptic stricture of esophagus, dilated to 18 mm. Erosive reflux esophagitis. Hiatal hernia. Erosive duodenitis.      UGI ENDOSCOPY, SIMPLE EXAM  02/20/07     HC UGI ENDOSCOPY, SIMPLE EXAM  12/02/08    Schiatzky's ring, dialated, PPI indefinitely     HC UGI ENDOSCOPY, SIMPLE EXAM  12/15/2009    benign stricture with dialation     LAPAROSCOPIC APPENDECTOMY  09/15/2005       Social History     Tobacco Use     Smoking status: Current Every Day Smoker     Packs/day: 1.00     Years: 52.00     Pack years: 52.00     Types: Cigarettes     Smokeless tobacco: Current User     Types: Chew     Tobacco comment: occasionally/ 1.5tin qwk   Substance Use Topics     Alcohol use: No     Comment: quit 7-1980     Family History   Problem Relation Age of Onset     Cancer Sister         brain tumor     Diabetes Sister      Alzheimer Disease Sister            Reviewed and updated as needed this visit by Provider  Tobacco  Allergies  Meds   Problems  Med Hx  Surg Hx  Fam Hx         Review of Systems   Constitutional, HEENT, cardiovascular, pulmonary, GI, , musculoskeletal, neuro, skin, endocrine and psych systems are negative, except as in HPI or otherwise noted         Objective    /80 (BP Location: Right arm, Patient Position: Chair, Cuff Size: Adult Regular)   Pulse 74   Temp 98.8  F (37.1  C) (Temporal)   Resp 24   Wt 83.5 kg (184 lb)   SpO2 98%   BMI 26.78 kg/m    Body mass index is 26.78 kg/m .  Physical Exam   GENERAL: healthy, alert and no distress  EYES: Eyes grossly normal to inspection, PERRL and conjunctivae and sclerae normal  HENT: ear canals and TM's normal, nose and mouth without ulcers or lesions  NECK: no adenopathy, no asymmetry, masses, or scars and thyroid normal to palpation  RESP: lungs clear to auscultation - no rales, rhonchi or wheezes  CV: regular rate and rhythm, normal S1 S2, no S3 or S4, no murmur, click or rub, no peripheral edema and peripheral pulses strong  ABDOMEN: soft, nontender, no hepatosplenomegaly, no masses and bowel sounds normal  MS: no gross musculoskeletal defects noted, no edema  SKIN: no suspicious lesions or rashes  NEURO: Normal strength and tone, mentation intact and speech normal  PSYCH: mentation appears normal, affect normal/bright            Assessment & Plan       ICD-10-CM    1. Pneumonia of left lower lobe due to infectious organism (H) J18.1 amoxicillin-clavulanate (AUGMENTIN) 875-125 MG tablet   2. Essential hypertension I10 Basic metabolic panel  (Ca, Cl, CO2, Creat, Gluc, K, Na, BUN)   3. Hyponatremia E87.1 Basic metabolic panel  (Ca, Cl, CO2, Creat, Gluc, K, Na, BUN)   4. Tobacco use disorder F17.200    5. Screening cholesterol level Z13.220 Lipid panel reflex to direct LDL Fasting     BP looking good. Has reduced smoking due to illness. But otherwise he has no interest in quitting. Has fevers and chills by history and again has L lower lobe crackles developing. Will treat  "with abx and should f/u if not improved in 1 week.  Cramping has much improved, though wondering if sodium is better and if he can have salt on his foods. Discussed low salt on labs and have been weaning BP meds, so likely ok to lightly salt foods.     Tobacco Cessation:   reports that he has been smoking cigarettes. He has a 52.00 pack-year smoking history. His smokeless tobacco use includes chew.  Tobacco Cessation Action Plan: Information offered: Patient not interested at this time      BMI:   Estimated body mass index is 26.78 kg/m  as calculated from the following:    Height as of 6/19/19: 1.765 m (5' 9.5\").    Weight as of this encounter: 83.5 kg (184 lb).         Return in about 1 week (around 9/30/2019) for if not improved.    Roberta Beaver MD, MD  [facility]    "

## 2019-09-23 ENCOUNTER — OFFICE VISIT (OUTPATIENT)
Dept: FAMILY MEDICINE | Facility: OTHER | Age: 84
End: 2019-09-23
Payer: COMMERCIAL

## 2019-09-23 VITALS
RESPIRATION RATE: 24 BRPM | TEMPERATURE: 98.8 F | WEIGHT: 184 LBS | BODY MASS INDEX: 26.78 KG/M2 | OXYGEN SATURATION: 98 % | HEART RATE: 74 BPM | DIASTOLIC BLOOD PRESSURE: 80 MMHG | SYSTOLIC BLOOD PRESSURE: 126 MMHG

## 2019-09-23 DIAGNOSIS — E87.1 HYPONATREMIA: ICD-10-CM

## 2019-09-23 DIAGNOSIS — I10 ESSENTIAL HYPERTENSION: ICD-10-CM

## 2019-09-23 DIAGNOSIS — J18.9 PNEUMONIA OF LEFT LOWER LOBE DUE TO INFECTIOUS ORGANISM: Primary | ICD-10-CM

## 2019-09-23 DIAGNOSIS — Z13.220 SCREENING CHOLESTEROL LEVEL: ICD-10-CM

## 2019-09-23 DIAGNOSIS — F17.200 TOBACCO USE DISORDER: ICD-10-CM

## 2019-09-23 LAB
ANION GAP SERPL CALCULATED.3IONS-SCNC: 9 MMOL/L (ref 3–14)
BUN SERPL-MCNC: 6 MG/DL (ref 7–30)
CALCIUM SERPL-MCNC: 8.4 MG/DL (ref 8.5–10.1)
CHLORIDE SERPL-SCNC: 95 MMOL/L (ref 94–109)
CHOLEST SERPL-MCNC: 131 MG/DL
CO2 SERPL-SCNC: 27 MMOL/L (ref 20–32)
CREAT SERPL-MCNC: 0.96 MG/DL (ref 0.66–1.25)
GFR SERPL CREATININE-BSD FRML MDRD: 72 ML/MIN/{1.73_M2}
GLUCOSE SERPL-MCNC: 96 MG/DL (ref 70–99)
HDLC SERPL-MCNC: 36 MG/DL
LDLC SERPL CALC-MCNC: 79 MG/DL
NONHDLC SERPL-MCNC: 95 MG/DL
POTASSIUM SERPL-SCNC: 4.1 MMOL/L (ref 3.4–5.3)
SODIUM SERPL-SCNC: 131 MMOL/L (ref 133–144)
TRIGL SERPL-MCNC: 78 MG/DL

## 2019-09-23 PROCEDURE — 80048 BASIC METABOLIC PNL TOTAL CA: CPT | Performed by: FAMILY MEDICINE

## 2019-09-23 PROCEDURE — 99214 OFFICE O/P EST MOD 30 MIN: CPT | Performed by: FAMILY MEDICINE

## 2019-09-23 PROCEDURE — 80061 LIPID PANEL: CPT | Performed by: FAMILY MEDICINE

## 2019-09-23 PROCEDURE — 36415 COLL VENOUS BLD VENIPUNCTURE: CPT | Performed by: FAMILY MEDICINE

## 2019-11-16 DIAGNOSIS — I10 ESSENTIAL HYPERTENSION: ICD-10-CM

## 2019-11-18 NOTE — TELEPHONE ENCOUNTER
Reason for Call:  Same Day Appointment, Requested Provider:  Roberta Beaver MD     PCP: Roberta Beaver    Reason for visit: Med refill    Duration of symptoms: n/a    Have you been treated for this in the past? Yes    Additional comments: Patient called to make appointment, couldn't do tonight and there are only 15 minute slots available on Wednesday. Patient wondering if that would be enough time. Please advise    Can we leave a detailed message on this number? YES    Phone number patient can be reached at: Home number on file [phone] (home)    Best Time: any    Call taken on 11/18/2019 at 10:07 AM by Opal Srinivasan

## 2019-11-19 RX ORDER — AMLODIPINE BESYLATE 5 MG/1
TABLET ORAL
Qty: 90 TABLET | Refills: 1 | Status: SHIPPED | OUTPATIENT
Start: 2019-11-19 | End: 2020-03-04

## 2019-11-19 NOTE — TELEPHONE ENCOUNTER
Prescription approved per [facility] [place].  Omar Bennett, RN, BSN

## 2019-11-20 DIAGNOSIS — R05.9 COUGH: ICD-10-CM

## 2019-11-20 RX ORDER — ALBUTEROL SULFATE 90 UG/1
AEROSOL, METERED RESPIRATORY (INHALATION)
Qty: 18 G | Refills: 3 | Status: ON HOLD | OUTPATIENT
Start: 2019-11-20 | End: 2020-05-23

## 2019-11-20 NOTE — TELEPHONE ENCOUNTER
[place] [facility] [place]  Omar Bennett, RN, BSN

## 2019-12-21 DIAGNOSIS — F41.1 ANXIETY STATE: ICD-10-CM

## 2019-12-23 RX ORDER — BUSPIRONE HYDROCHLORIDE 5 MG/1
TABLET ORAL
Qty: 180 TABLET | Refills: 1 | Status: ON HOLD | OUTPATIENT
Start: 2019-12-23 | End: 2020-05-23

## 2019-12-24 NOTE — TELEPHONE ENCOUNTER
Pending Prescriptions:                       Disp   Refills    busPIRone (BUSPAR) 5 MG tablet [Pharmacy *180 ta*1            Sig: TAKE ONE TABLET BY MOUTH TWICE DAILY     Pres[place] [facility] [place].    Fiorella Deshpande, RODN, RN, PHN

## 2020-02-29 ENCOUNTER — HOSPITAL ENCOUNTER (EMERGENCY)
Facility: CLINIC | Age: 85
Discharge: HOME | End: 2020-02-29
Attending: EMERGENCY MEDICINE | Admitting: EMERGENCY MEDICINE
Payer: COMMERCIAL

## 2020-02-29 VITALS
SYSTOLIC BLOOD PRESSURE: 133 MMHG | BODY MASS INDEX: 27.22 KG/M2 | OXYGEN SATURATION: 99 % | TEMPERATURE: 97.7 F | RESPIRATION RATE: 18 BRPM | DIASTOLIC BLOOD PRESSURE: 74 MMHG | WEIGHT: 187 LBS

## 2020-02-29 DIAGNOSIS — E87.1 HYPONATREMIA: ICD-10-CM

## 2020-02-29 DIAGNOSIS — R42 WOOZINESS: ICD-10-CM

## 2020-02-29 LAB
ALBUMIN SERPL-MCNC: 3.3 G/DL (ref 3.4–5)
ALBUMIN UR-MCNC: NEGATIVE MG/DL
ALP SERPL-CCNC: 89 U/L (ref 40–150)
ALT SERPL W P-5'-P-CCNC: 16 U/L (ref 0–70)
ANION GAP SERPL CALCULATED.3IONS-SCNC: 7 MMOL/L (ref 3–14)
APPEARANCE UR: CLEAR
AST SERPL W P-5'-P-CCNC: 16 U/L (ref 0–45)
BASOPHILS # BLD AUTO: 0 10E9/L (ref 0–0.2)
BASOPHILS NFR BLD AUTO: 0.5 %
BILIRUB SERPL-MCNC: 0.6 MG/DL (ref 0.2–1.3)
BILIRUB UR QL STRIP: NEGATIVE
BUN SERPL-MCNC: 8 MG/DL (ref 7–30)
CALCIUM SERPL-MCNC: 8.5 MG/DL (ref 8.5–10.1)
CHLORIDE SERPL-SCNC: 98 MMOL/L (ref 94–109)
CO2 SERPL-SCNC: 26 MMOL/L (ref 20–32)
COLOR UR AUTO: YELLOW
CREAT SERPL-MCNC: 0.95 MG/DL (ref 0.66–1.25)
DIFFERENTIAL METHOD BLD: NORMAL
EOSINOPHIL NFR BLD AUTO: 3.1 %
ERYTHROCYTE [DISTWIDTH] IN BLOOD BY AUTOMATED COUNT: 12.4 % (ref 10–15)
ETHANOL SERPL-MCNC: <0.01 G/DL
GFR SERPL CREATININE-BSD FRML MDRD: 72 ML/MIN/{1.73_M2}
GLUCOSE SERPL-MCNC: 106 MG/DL (ref 70–99)
GLUCOSE UR STRIP-MCNC: NEGATIVE MG/DL
HCT VFR BLD AUTO: 42.8 % (ref 40–53)
HGB BLD-MCNC: 14.6 G/DL (ref 13.3–17.7)
HGB UR QL STRIP: NEGATIVE
IMM GRANULOCYTES # BLD: 0 10E9/L (ref 0–0.4)
IMM GRANULOCYTES NFR BLD: 0.3 %
KETONES UR STRIP-MCNC: NEGATIVE MG/DL
LEUKOCYTE ESTERASE UR QL STRIP: NEGATIVE
LYMPHOCYTES # BLD AUTO: 1.4 10E9/L (ref 0.8–5.3)
LYMPHOCYTES NFR BLD AUTO: 23.3 %
MCH RBC QN AUTO: 30 PG (ref 26.5–33)
MCHC RBC AUTO-ENTMCNC: 34.1 G/DL (ref 31.5–36.5)
MCV RBC AUTO: 88 FL (ref 78–100)
MONOCYTES # BLD AUTO: 0.7 10E9/L (ref 0–1.3)
MONOCYTES NFR BLD AUTO: 11.2 %
NEUTROPHILS # BLD AUTO: 3.8 10E9/L (ref 1.6–8.3)
NEUTROPHILS NFR BLD AUTO: 61.6 %
NITRATE UR QL: NEGATIVE
NRBC # BLD AUTO: 0 10*3/UL
NRBC BLD AUTO-RTO: 0 /100
PH UR STRIP: 7 PH (ref 5–7)
PLATELET # BLD AUTO: 199 10E9/L (ref 150–450)
POTASSIUM SERPL-SCNC: 3.8 MMOL/L (ref 3.4–5.3)
PROT SERPL-MCNC: 6.9 G/DL (ref 6.8–8.8)
RBC # BLD AUTO: 4.86 10E12/L (ref 4.4–5.9)
SODIUM SERPL-SCNC: 131 MMOL/L (ref 133–144)
SOURCE: NORMAL
SP GR UR STRIP: 1 (ref 1–1.03)
UROBILINOGEN UR STRIP-MCNC: 0 MG/DL (ref 0–2)
WBC # BLD AUTO: 6.2 10E9/L (ref 4–11)

## 2020-02-29 PROCEDURE — 85025 COMPLETE CBC W/AUTO DIFF WBC: CPT | Performed by: EMERGENCY MEDICINE

## 2020-02-29 PROCEDURE — 25800030 ZZH RX IP 258 OP 636: Performed by: EMERGENCY MEDICINE

## 2020-02-29 PROCEDURE — 80320 DRUG SCREEN QUANTALCOHOLS: CPT | Performed by: EMERGENCY MEDICINE

## 2020-02-29 PROCEDURE — 81003 URINALYSIS AUTO W/O SCOPE: CPT | Mod: XU | Performed by: EMERGENCY MEDICINE

## 2020-02-29 PROCEDURE — 80053 COMPREHEN METABOLIC PANEL: CPT | Performed by: EMERGENCY MEDICINE

## 2020-02-29 PROCEDURE — 99284 EMERGENCY DEPT VISIT MOD MDM: CPT | Mod: Z6 | Performed by: EMERGENCY MEDICINE

## 2020-02-29 PROCEDURE — 96360 HYDRATION IV INFUSION INIT: CPT | Performed by: EMERGENCY MEDICINE

## 2020-02-29 PROCEDURE — 99284 EMERGENCY DEPT VISIT MOD MDM: CPT | Mod: 25 | Performed by: EMERGENCY MEDICINE

## 2020-02-29 RX ORDER — SODIUM CHLORIDE 9 MG/ML
1000 INJECTION, SOLUTION INTRAVENOUS CONTINUOUS
Status: DISCONTINUED | OUTPATIENT
Start: 2020-02-29 | End: 2020-02-29 | Stop reason: HOSPADM

## 2020-02-29 RX ADMIN — SODIUM CHLORIDE 500 ML: 9 INJECTION, SOLUTION INTRAVENOUS at 14:20

## 2020-02-29 NOTE — ED TRIAGE NOTES
Pt comes in with complaints of dizziness. Pt states he has been diagnosed with vertigo, but does not believe that is the cause.

## 2020-02-29 NOTE — ED PROVIDER NOTES
"  History     Chief Complaint   Patient presents with     Dizziness     The history is provided by the patient.     Jerry Rodriguez is a 85 year old male who is presenting to the emergency department with complaints of dizziness. The patient states that he has been having dizzy episodes for the past month. The episode today started suddenly when he itched his forehead. He claims the dizzy feeling went down his entire body and makes him feel \"woozy\". He notes getting a tingling feeling in his right arm, weakness in both legs and claims that his vision is \"different\" during these episodes. The dizziness typically lasts about five to ten minutes and subsides on it's own. He says it will start when he is sitting or lying down, but when standing he feels fine. He does not get the feeling that he is going to pass out. He denies having confusion, slurred speech, or difficulty walking following these episodes. He has no chest pain or shortness of breath. He mentions always having a cough because he is a smoker. Normal bowel and bladder. The patient has seen his primary care physician for his episodes of dizziness and was diagnosed with vertigo. He claims he does not like her ideas and does not think her diagnosis is correct. He has medications for the dizziness, but says they do not help. During previous episodes of dizziness he has visited the chiropractor and his symptoms subsided. He notes his current episodes being different than his previous ones.     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  Moderate at L4/5.  MRI 7/2014       Degeneration of lumbar intervertebral disc 08/01/2014     Priority: Medium     HTN, goal below 140/90 03/31/2014     Priority: " Medium     Vertigo 03/31/2014     Priority: Medium     Adenomatous polyp of colon 03/31/2014     Priority: Medium     Bullous dermatitis 08/13/2013     Priority: Medium     NOEMI positive 02/20/2013     Priority: Medium     Hammer toe 02/06/2013     Priority: Medium     Corn 02/06/2013     Priority: Medium     Degenerative arthritis of finger 05/23/2011     Priority: Medium     (Problem list name updated by automated process. Provider to review and confirm.)       Hyponatremia 01/13/2011     Priority: Medium     Tobacco use disorder 02/13/2009     Priority: Medium     HTN (hypertension) 01/28/2009     Priority: Medium     Anxiety state      Priority: Medium     Problem list name updated by automated process. Provider to review       Stricture and stenosis of esophagus 08/02/2004     Priority: Medium     Primary localized osteoarthrosis of shoulder region 01/07/2004     Priority: Medium     Problem list name updated by automated process. Provider to review       Chronic rhinitis 04/05/2002     Priority: Medium     Generalized osteoarthrosis, unspecified site 04/05/2002     Priority: Medium     Other atopic dermatitis and related conditions      Priority: Medium        Past Medical History:    Past Medical History:   Diagnosis Date     Anxiety state, unspecified      Arthritis      Hypertension      Hyponatremia      Inguinal hernia with obstruction, without mention of gangrene, unilateral or unspecified, (not specified as recurrent)      Inguinal hernia without mention of obstruction or gangrene, unilateral or unspecified, (not specified as recurrent) 11/07/2000     Other joint derangement, not elsewhere classified, forearm 1980     Pathologic fracture of neck of femur (H)      Stricture and stenosis of esophagus 2004       Past Surgical History:    Past Surgical History:   Procedure Laterality Date     C OSTEOTOMY HIP/FEMUR  1980    traumatic fracture right hip     COLONOSCOPY  10/17/2007    Repeat Colonoscopy in 5  years for surveillance.     COLONOSCOPY N/A 1/24/2018    Procedure: COLONOSCOPY;;  Surgeon: Don Arambula MD;  Location[facility]     ESOPHAGOSCOPY, GASTROSCOPY, DUODENOSCOPY (EGD), COMBINED  1/4/2011    COMBINED ESOPHAGOSCOPY, GASTROSCOPY, DUODENOSCOPY (EGD), ESOPHAGEAL DILATION BALLOON LESS THAN 30MM performed by ELEN BUSBY at  GI     ESOPHAGOSCOPY, GASTROSCOPY, DUODENOSCOPY (EGD), COMBINED N/A 9/25/2015    Procedure: COMBINED ESOPHAGOSCOPY, GASTROSCOPY, DUODENOSCOPY (EGD), BIOPSY SINGLE OR MULTIPLE;  Surgeon: Markell Lawson MD;  [facility]     ESOPHAGOSCOPY, GASTROSCOPY, DUODENOSCOPY (EGD), COMBINED N/A 1/24/2018    Procedure: COMBINED ESOPHAGOSCOPY, GASTROSCOPY, DUODENOSCOPY (EGD), BIOPSY SINGLE OR MULTIPLE;;  Surgeon: Don Arambula MD;  [facility]     ESOPHAGOSCOPY, GASTROSCOPY, DUODENOSCOPY (EGD), DILATATION, COMBINED N/A 1/24/2018    Procedure: COMBINED ESOPHAGOSCOPY, GASTROSCOPY, DUODENOSCOPY (EGD), DILATATION;  Esophagogastroduodenoscopy with Dilatation and Biopsy by Biopsy, Colonoscopy;  Surgeon: Don Arambula MD;  Location:  GI      COLONOSCOPY THRU STOMA, DIAGNOSTIC  11/15/2000    Recurrent abdominal pain     HC COLONOSCOPY W/WO BRUSH/WASH  07/19/2004     HC REPAIR INCISIONAL HERNIA,REDUCIBLE  11/10/2000    Hernia Repair, Incisional, Unilateral, Left inguinal hernia and umbilical hernia     HC UGI ENDOSCOPY DIAG W OR W/O BRUSH/WASH  07/19/2004     HC UGI ENDOSCOPY DIAG W OR W/O BRUSH/WASH  05/24/2005    Peptic stricture of esophagus, dilated to 18 mm. Erosive reflux esophagitis. Hiatal hernia. Erosive duodenitis.     HC UGI ENDOSCOPY, SIMPLE EXAM  02/20/07     HC UGI ENDOSCOPY, SIMPLE EXAM  12/02/08    Schiatzky's ring, dialated, PPI indefinitely     HC UGI ENDOSCOPY, SIMPLE EXAM  12/15/2009    benign stricture with dialation     LAPAROSCOPIC APPENDECTOMY  09/15/2005       Family History:    Family History   Problem Relation Age of Onset     Cancer Sister          brain tumor     Diabetes Sister      Alzheimer Disease Sister        Social History:  Marital Status:   [2]  Social History     Tobacco Use     Smoking status: Current Every Day Smoker     Packs/day: 1.00     Years: 52.00     Pack years: 52.00     Types: Cigarettes     Smokeless tobacco: Current User     Types: Chew     Tobacco comment: occasionally/ 1.5tin qwk   Substance Use Topics     Alcohol use: No     Comment: quit 7-1980     Drug use: No        Medications:    amLODIPine (NORVASC) 5 MG tablet  amoxicillin-clavulanate (AUGMENTIN) 875-125 MG tablet  busPIRone (BUSPAR) 5 MG tablet  cetirizine (ZYRTEC) 10 MG tablet  fluticasone (FLONASE) 50 MCG/ACT spray  multivitamin, therapeutic with minerals (MULTI-VITAMIN) TABS  omeprazole (PRILOSEC) 20 MG DR capsule  Spacer/Aero-Holding Chambers (VALVED HOLDING CHAMBER) JULISSA  timolol (TIMOPTIC) 0.5 % ophthalmic solution  TIMOLOL 0.25 % OP SOLN  VENTOLIN  (90 Base) MCG/ACT inhaler          Review of Systems   All other systems reviewed and are negative.      Physical Exam   BP: 133/74  Heart Rate: 76  Temp: 97.7  F (36.5  C)  Resp: 18  Weight: 84.8 kg (187 lb)  SpO2: 99 %  Lying Orthostatic BP: 127/73  Lying Orthostatic Pulse: 73 bpm  Sitting Orthostatic BP: 128/75  Sitting Orthostatic Pulse: 70 bpm  Standing Orthostatic BP: 127/71  Standing Orthostatic Pulse: 75 bpm      Physical Exam  Vitals signs and nursing note reviewed.   Constitutional:       General: He is not in acute distress.     Appearance: He is well-developed. He is not diaphoretic.   HENT:      Head: Normocephalic and atraumatic.      Right Ear: External ear normal.      Left Ear: External ear normal.      Nose: Nose normal.      Mouth/Throat:      Mouth: Mucous membranes are moist.   Eyes:      General: No scleral icterus.     Extraocular Movements: Extraocular movements intact.   Neck:      Musculoskeletal: Normal range of motion and neck supple.   Cardiovascular:      Rate and Rhythm: Normal  rate and regular rhythm.   Pulmonary:      Effort: Pulmonary effort is normal.      Breath sounds: Normal breath sounds.   Abdominal:      Tenderness: There is no abdominal tenderness. There is no guarding or rebound.   Musculoskeletal: Normal range of motion.         General: No swelling or tenderness.   Skin:     General: Skin is warm and dry.      Findings: No rash.   Neurological:      Mental Status: He is alert and oriented to person, place, and time.   Psychiatric:         Mood and Affect: Mood normal.         Thought Content: Thought content normal.         ED Course        Procedures                   Results for orders placed or performed during the hospital encounter of 02/29/20 (from the past 24 hour(s))   CBC with platelets differential   Result Value Ref Range    WBC 6.2 4.0 - 11.0 10e9/L    RBC Count 4.86 4.4 - 5.9 10e12/L    Hemoglobin 14.6 13.3 - 17.7 g/dL    Hematocrit 42.8 40.0 - 53.0 %    MCV 88 78 - 100 fl    MCH 30.0 26.5 - 33.0 pg    MCHC 34.1 31.5 - 36.5 g/dL    RDW 12.4 10.0 - 15.0 %    Platelet Count 199 150 - 450 10e9/L    Diff Method Automated Method     % Neutrophils 61.6 %    % Lymphocytes 23.3 %    % Monocytes 11.2 %    % Eosinophils 3.1 %    % Basophils 0.5 %    % Immature Granulocytes 0.3 %    Nucleated RBCs 0 0 /100    Absolute Neutrophil 3.8 1.6 - 8.3 10e9/L    Absolute Lymphocytes 1.4 0.8 - 5.3 10e9/L    Absolute Monocytes 0.7 0.0 - 1.3 10e9/L    Absolute Basophils 0.0 0.0 - 0.2 10e9/L    Abs Immature Granulocytes 0.0 0 - 0.4 10e9/L    Absolute Nucleated RBC 0.0    Comprehensive metabolic panel   Result Value Ref Range    Sodium 131 (L) 133 - 144 mmol/L    Potassium 3.8 3.4 - 5.3 mmol/L    Chloride 98 94 - 109 mmol/L    Carbon Dioxide 26 20 - 32 mmol/L    Anion Gap 7 3 - 14 mmol/L    Glucose 106 (H) 70 - 99 mg/dL    Urea Nitrogen 8 7 - 30 mg/dL    Creatinine 0.95 0.66 - 1.25 mg/dL    GFR Estimate 72 >60 mL/min/[1.73_m2]    GFR Estimate If Black 84 >60 mL/min/[1.73_m2]    Calcium  8.5 8.5 - 10.1 mg/dL    Bilirubin Total 0.6 0.2 - 1.3 mg/dL    Albumin 3.3 (L) 3.4 - 5.0 g/dL    Protein Total 6.9 6.8 - 8.8 g/dL    Alkaline Phosphatase 89 40 - 150 U/L    ALT 16 0 - 70 U/L    AST 16 0 - 45 U/L   Alcohol ethyl   Result Value Ref Range    Ethanol g/dL <0.01 <0.01 g/dL   UA reflex to Microscopic and Culture   Result Value Ref Range    Color Urine Yellow     Appearance Urine Clear     Glucose Urine Negative NEG^Negative mg/dL    Bilirubin Urine Negative NEG^Negative    Ketones Urine Negative NEG^Negative mg/dL    Specific Gravity Urine 1.004 1.003 - 1.035    Blood Urine Negative NEG^Negative    pH Urine 7.0 5.0 - 7.0 pH    Protein Albumin Urine Negative NEG^Negative mg/dL    Urobilinogen mg/dL 0.0 0.0 - 2.0 mg/dL    Nitrite Urine Negative NEG^Negative    Leukocyte Esterase Urine Negative NEG^Negative    Source Midstream Urine        Medications   0.9% sodium chloride BOLUS (0 mLs Intravenous Stopped 2/29/20 1454)     Followed by   sodium chloride 0.9% infusion (has no administration in time range)       Assessments & Plan (with Medical Decision Making)  Jerry Rodriguez is an 85 year old male with a history of vertigo and hyponatremia who presents to the emergency department with dizziness. He has a history of tobacco use and smokes about one ppd for the past sixty seven years. His vitals are all within the normal limits. Physical exam reveals no focal neurologic findings and no specific acute findings. The patient was given IV fluids while in the ED. A CBC and UA were obtained and were normal. A CMP was also collected and showed mildly low sodium levels.  I am not sure of the etiology for these symptoms although they are short-lived and are not associated with focal neurologic changes to suggest a TIA.  Certainly he is at risk for cardiovascular complications given his history of cigarette smoking.  Assessment: Woozy spells of uncertain significance, mild hyponatremia.  Plan  Discharge home, return  to the emergency department precautions given.     I have reviewed the nursing notes.    I have reviewed the findings, diagnosis, plan and need for follow up with the patient.      Discharge Medication List as of 2/29/2020  3:07 PM          Final diagnoses:   Wooziness   Hyponatremia       This document serves as a record of services personally performed by Javier Kendrick MD. It was created on their behalf by Fide Henderson, a trained medical scribe. The creation of this record is based on the provider's personal observations and the statements of the patient. This document has been checked and approved by the attending provider.  Note: Chart documentation done in part with Dragon Voice Recognition software. Although reviewed after completion, some word and grammatical errors may remain.  2/29/2020   [facility] EMERGENCY DEPARTMENT     Javier Kendrick MD  02/29/20 1558

## 2020-02-29 NOTE — DISCHARGE INSTRUCTIONS
"I am not sure of the cause of your \"spells\". Besides mildly low sodium, your labs looked good. Please follow up with your doctor should symptoms persist.   "

## 2020-02-29 NOTE — ED AVS SNAPSHOT
[facility] Emergency Department  [address] [place] [address]  Phone:  [phone]  Fax:  [phone]                                    Jerry Rodriguez   MRN: [id number]    Department:  [facility] Emergency Department   Date of Visit:  2/29/2020           After Visit Summary Signature Page    I have received my discharge instructions, and my questions have been answered. I have discussed any challenges I see with this plan with the nurse or doctor.    ..........................................................................................................................................  Patient/Patient Representative Signature      ..........................................................................................................................................  Patient Representative Print Name and Relationship to Patient    ..................................................               ................................................  Date                                   Time    ..........................................................................................................................................  Reviewed by Signature/Title    ...................................................              ..............................................  Date                                               Time          22EPIC Rev 08/18

## 2020-02-29 NOTE — ED NOTES
Pt states he has felt dizzy since his recent birthday.  He feels like he is spinning vs the room.  He does have a hx of vertigo and smokes at least 1 ppd.  Orthostatic bp done w/o complaints of dizziness.  Denies any n/v/f/c.

## 2020-03-02 NOTE — PROGRESS NOTES
"Subjective     Jerry Rodriguez is a 85 year old male who presents to clinic today for the following health issues:    HPI   ED/UC Followup:    Facility:  [facility]   Date of visit: 2-  Reason for visit: Dizziness/Hyponatremia   Current Status: Patient states that he doing a little better, depends on what he is doing. He has been taking an OTC Dramamine.   (ER NOTE)  Jerry Rodriguez is a 85 year old male who is presenting to the emergency department with complaints of dizziness. The patient states that he has been having dizzy episodes for the past month. The episode today started suddenly when he itched his forehead. He claims the dizzy feeling went down his entire body and makes him feel \"woozy\". He notes getting a tingling feeling in his right arm, weakness in both legs and claims that his vision is \"different\" during these episodes. The dizziness typically lasts about five to ten minutes and subsides on it's own. He says it will start when he is sitting or lying down, but when standing he feels fine. He does not get the feeling that he is going to pass out. He denies having confusion, slurred speech, or difficulty walking following these episodes. He has no chest pain or shortness of breath. He mentions always having a cough because he is a smoker. Normal bowel and bladder. The patient has seen his primary care physician for his episodes of dizziness and was diagnosed with vertigo. He claims he does not like her ideas and does not think her diagnosis is correct. He has medications for the dizziness, but says they do not help. During previous episodes of dizziness he has visited the chiropractor and his symptoms subsided. He notes his current episodes being different than his previous ones.            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     COLONOSCOPY N/A 1/24/2018    Procedure: COLONOSCOPY;;  Surgeon: oDn Arambula MD;  Location: [facility]     ESOPHAGOSCOPY, GASTROSCOPY, DUODENOSCOPY (EGD), COMBINED  1/4/2011    COMBINED ESOPHAGOSCOPY, GASTROSCOPY, DUODENOSCOPY (EGD), ESOPHAGEAL DILATION BALLOON LESS THAN 30MM performed by ELEN BUSBY at  GI     ESOPHAGOSCOPY, GASTROSCOPY, DUODENOSCOPY (EGD), COMBINED N/A 9/25/2015    Procedure: COMBINED ESOPHAGOSCOPY, GASTROSCOPY, DUODENOSCOPY (EGD), BIOPSY SINGLE OR MULTIPLE;  Surgeon: Markell Lawson MD;  Location:  GI     ESOPHAGOSCOPY, GASTROSCOPY, DUODENOSCOPY (EGD), COMBINED N/A 1/24/2018    Procedure: COMBINED ESOPHAGOSCOPY, GASTROSCOPY, DUODENOSCOPY (EGD), BIOPSY SINGLE OR MULTIPLE;;  Surgeon: Don Arambula MD;  Location: [facility]     ESOPHAGOSCOPY, GASTROSCOPY, DUODENOSCOPY (EGD), DILATATION, COMBINED N/A 1/24/2018    Procedure: COMBINED ESOPHAGOSCOPY, GASTROSCOPY, DUODENOSCOPY (EGD), DILATATION;  Esophagogastroduodenoscopy with Dilatation and Biopsy by Biopsy, Colonoscopy;  Surgeon: Don Arambula MD;  Location: [facility]     HC COLONOSCOPY THRU STOMA, DIAGNOSTIC  11/15/2000    Recurrent abdominal pain     HC COLONOSCOPY W/WO BRUSH/WASH  07/19/2004     HC REPAIR INCISIONAL HERNIA,REDUCIBLE  11/10/2000     Hernia Repair, Incisional, Unilateral, Left inguinal hernia and umbilical hernia     HC UGI ENDOSCOPY DIAG W OR W/O BRUSH/WASH  07/19/2004     HC UGI ENDOSCOPY DIAG W OR W/O BRUSH/WASH  05/24/2005    Peptic stricture of esophagus, dilated to 18 mm. Erosive reflux esophagitis. Hiatal hernia. Erosive duodenitis.      UGI ENDOSCOPY, SIMPLE EXAM  02/20/07     HC UGI ENDOSCOPY, SIMPLE EXAM  12/02/08    Schiatzky's ring, dialated, PPI indefinitely     HC UGI ENDOSCOPY, SIMPLE EXAM  12/15/2009    benign stricture with dialation     LAPAROSCOPIC APPENDECTOMY  09/15/2005       Social History     Tobacco Use     Smoking status: Current Every Day Smoker     Packs/day: 1.00     Years: 52.00     Pack years: 52.00     Types: Cigarettes     Smokeless tobacco: Current User     Types: Chew     Tobacco comment: occasionally/ 1.5tin qwk   Substance Use Topics     Alcohol use: No     Comment: quit 7-1980     Family History   Problem Relation Age of Onset     Cancer Sister         brain tumor     Diabetes Sister      Alzheimer Disease Sister            Reviewed and updated as needed this visit by Provider  Tobacco  Allergies  Meds  Problems  Med Hx  Surg Hx  Fam Hx         Review of Systems   ROS COMP: Constitutional, HEENT, cardiovascular, pulmonary, GI, , musculoskeletal, neuro, skin, endocrine and psych systems are negative, except as otherwise noted.      Objective    BP 96/52 (BP Location: Left arm, Patient Position: Chair, Cuff Size: Adult Regular)   Pulse 74   Temp 98.1  F (36.7  C) (Temporal)   Resp 24   Wt 85.3 kg (188 lb)   SpO2 95%   BMI 27.36 kg/m    Body mass index is 27.36 kg/m .  Physical Exam   GENERAL: healthy, alert and no distress  HENT: ear canals and TM's normal, nose and mouth without ulcers or lesions  NECK: no adenopathy, no asymmetry, masses, or scars and thyroid normal to palpation  RESP: lungs clear to auscultation - no rales, rhonchi or wheezes  CV: regular rate and rhythm, normal S1 S2, no  S3 or S4, no murmur, click or rub, no peripheral edema and peripheral pulses strong  ABDOMEN: soft, nontender, no hepatosplenomegaly, no masses and bowel sounds normal  MS: no gross musculoskeletal defects noted, no edema            Assessment & Plan       ICD-10-CM    1. Hyponatremia E87.1 Basic metabolic panel     Sodium random urine     Creatinine random urine   2. Dizziness R42      Patient has had on and off dizziness in past thought to be BPPV, though this last it was not. ED visit showed hyponatremia. As he has salted his food and chose salty foods despite warnings to his BP for years and is now going low on sodium and BP, I wonder if something more is going on. Will get FENA testing as well as stop the norvasc as just low BP can cause his dizziness. He is hard of hearing, but answered a few things incorrectly initially as well. This may just be his hearing as he did better with a louder voice, but would watch for mentation changes as he just seems different from usual.  I would like to see him again within the month if he does not find a positive on his tests today to explain his dizziness and stopping the BP does not see it improve.       Return in about 4 weeks (around 4/1/2020) for if not improved.    Roberta Beaver MD, MD  [facility]

## 2020-03-04 ENCOUNTER — OFFICE VISIT (OUTPATIENT)
Dept: FAMILY MEDICINE | Facility: OTHER | Age: 85
End: 2020-03-04
Payer: COMMERCIAL

## 2020-03-04 VITALS
TEMPERATURE: 98.1 F | HEART RATE: 74 BPM | OXYGEN SATURATION: 95 % | DIASTOLIC BLOOD PRESSURE: 52 MMHG | WEIGHT: 188 LBS | RESPIRATION RATE: 24 BRPM | SYSTOLIC BLOOD PRESSURE: 96 MMHG | BODY MASS INDEX: 27.36 KG/M2

## 2020-03-04 DIAGNOSIS — R42 DIZZINESS: ICD-10-CM

## 2020-03-04 DIAGNOSIS — E87.1 HYPONATREMIA: Primary | ICD-10-CM

## 2020-03-04 LAB
ANION GAP SERPL CALCULATED.3IONS-SCNC: 9 MMOL/L (ref 3–14)
BUN SERPL-MCNC: 8 MG/DL (ref 7–30)
CALCIUM SERPL-MCNC: 8.4 MG/DL (ref 8.5–10.1)
CHLORIDE SERPL-SCNC: 93 MMOL/L (ref 94–109)
CO2 SERPL-SCNC: 25 MMOL/L (ref 20–32)
CREAT SERPL-MCNC: 0.96 MG/DL (ref 0.66–1.25)
CREAT UR-MCNC: 33 MG/DL
GFR SERPL CREATININE-BSD FRML MDRD: 72 ML/MIN/{1.73_M2}
GLUCOSE SERPL-MCNC: 96 MG/DL (ref 70–99)
POTASSIUM SERPL-SCNC: 4.1 MMOL/L (ref 3.4–5.3)
SODIUM SERPL-SCNC: 127 MMOL/L (ref 133–144)
SODIUM UR-SCNC: 39 MMOL/L

## 2020-03-04 PROCEDURE — 83935 ASSAY OF URINE OSMOLALITY: CPT | Performed by: FAMILY MEDICINE

## 2020-03-04 PROCEDURE — 84443 ASSAY THYROID STIM HORMONE: CPT | Performed by: FAMILY MEDICINE

## 2020-03-04 PROCEDURE — 80048 BASIC METABOLIC PNL TOTAL CA: CPT | Performed by: FAMILY MEDICINE

## 2020-03-04 PROCEDURE — 82570 ASSAY OF URINE CREATININE: CPT | Performed by: FAMILY MEDICINE

## 2020-03-04 PROCEDURE — 36415 COLL VENOUS BLD VENIPUNCTURE: CPT | Performed by: FAMILY MEDICINE

## 2020-03-04 PROCEDURE — 99214 OFFICE O/P EST MOD 30 MIN: CPT | Performed by: FAMILY MEDICINE

## 2020-03-04 PROCEDURE — 84300 ASSAY OF URINE SODIUM: CPT | Performed by: FAMILY MEDICINE

## 2020-03-04 RX ORDER — DIMENHYDRINATE 50 MG
50 TABLET ORAL
COMMUNITY
End: 2020-05-20

## 2020-03-04 ASSESSMENT — ANXIETY QUESTIONNAIRES
1. FEELING NERVOUS, ANXIOUS, OR ON EDGE: NOT AT ALL
GAD7 TOTAL SCORE: 1
5. BEING SO RESTLESS THAT IT IS HARD TO SIT STILL: NOT AT ALL
3. WORRYING TOO MUCH ABOUT DIFFERENT THINGS: SEVERAL DAYS
6. BECOMING EASILY ANNOYED OR IRRITABLE: NOT AT ALL
IF YOU CHECKED OFF ANY PROBLEMS ON THIS QUESTIONNAIRE, HOW DIFFICULT HAVE THESE PROBLEMS MADE IT FOR YOU TO DO YOUR WORK, TAKE CARE OF THINGS AT HOME, OR GET ALONG WITH OTHER PEOPLE: NOT DIFFICULT AT ALL
2. NOT BEING ABLE TO STOP OR CONTROL WORRYING: NOT AT ALL
7. FEELING AFRAID AS IF SOMETHING AWFUL MIGHT HAPPEN: NOT AT ALL
4. TROUBLE RELAXING: NOT AT ALL

## 2020-03-05 LAB
OSMOLALITY UR: 179 MMOL/KG (ref 100–1200)
TSH SERPL DL<=0.005 MIU/L-ACNC: 1.88 MU/L (ref 0.4–4)

## 2020-03-05 ASSESSMENT — ANXIETY QUESTIONNAIRES: GAD7 TOTAL SCORE: 1

## 2020-03-06 ENCOUNTER — APPOINTMENT (OUTPATIENT)
Dept: CT IMAGING | Facility: CLINIC | Age: 85
End: 2020-03-06
Attending: EMERGENCY MEDICINE
Payer: COMMERCIAL

## 2020-03-06 ENCOUNTER — TELEPHONE (OUTPATIENT)
Dept: FAMILY MEDICINE | Facility: OTHER | Age: 85
End: 2020-03-06

## 2020-03-06 ENCOUNTER — HOSPITAL ENCOUNTER (EMERGENCY)
Facility: CLINIC | Age: 85
Discharge: HOME | End: 2020-03-06
Attending: EMERGENCY MEDICINE | Admitting: EMERGENCY MEDICINE
Payer: COMMERCIAL

## 2020-03-06 VITALS
DIASTOLIC BLOOD PRESSURE: 84 MMHG | RESPIRATION RATE: 22 BRPM | OXYGEN SATURATION: 97 % | TEMPERATURE: 97.9 F | SYSTOLIC BLOOD PRESSURE: 146 MMHG | BODY MASS INDEX: 26.93 KG/M2 | WEIGHT: 185 LBS | HEART RATE: 64 BPM

## 2020-03-06 DIAGNOSIS — E87.1 HYPONATREMIA: ICD-10-CM

## 2020-03-06 DIAGNOSIS — K80.20 CALCULUS OF GALLBLADDER WITHOUT CHOLECYSTITIS WITHOUT OBSTRUCTION: ICD-10-CM

## 2020-03-06 DIAGNOSIS — R91.8 PULMONARY NODULES: ICD-10-CM

## 2020-03-06 LAB
ALBUMIN SERPL-MCNC: 3.2 G/DL (ref 3.4–5)
ALP SERPL-CCNC: 86 U/L (ref 40–150)
ALT SERPL W P-5'-P-CCNC: 15 U/L (ref 0–70)
ANION GAP SERPL CALCULATED.3IONS-SCNC: 8 MMOL/L (ref 3–14)
AST SERPL W P-5'-P-CCNC: 14 U/L (ref 0–45)
BASOPHILS # BLD AUTO: 0 10E9/L (ref 0–0.2)
BASOPHILS NFR BLD AUTO: 0.5 %
BILIRUB SERPL-MCNC: 0.7 MG/DL (ref 0.2–1.3)
BUN SERPL-MCNC: 7 MG/DL (ref 7–30)
CALCIUM SERPL-MCNC: 8.3 MG/DL (ref 8.5–10.1)
CHLORIDE SERPL-SCNC: 97 MMOL/L (ref 94–109)
CO2 SERPL-SCNC: 25 MMOL/L (ref 20–32)
CREAT SERPL-MCNC: 0.9 MG/DL (ref 0.66–1.25)
DIFFERENTIAL METHOD BLD: NORMAL
EOSINOPHIL NFR BLD AUTO: 3.2 %
ERYTHROCYTE [DISTWIDTH] IN BLOOD BY AUTOMATED COUNT: 12.2 % (ref 10–15)
GFR SERPL CREATININE-BSD FRML MDRD: 77 ML/MIN/{1.73_M2}
GLUCOSE SERPL-MCNC: 98 MG/DL (ref 70–99)
HCT VFR BLD AUTO: 42.2 % (ref 40–53)
HGB BLD-MCNC: 14.3 G/DL (ref 13.3–17.7)
IMM GRANULOCYTES # BLD: 0 10E9/L (ref 0–0.4)
IMM GRANULOCYTES NFR BLD: 0.4 %
LYMPHOCYTES # BLD AUTO: 1.3 10E9/L (ref 0.8–5.3)
LYMPHOCYTES NFR BLD AUTO: 23.2 %
MAGNESIUM SERPL-MCNC: 2 MG/DL (ref 1.6–2.3)
MCH RBC QN AUTO: 29.8 PG (ref 26.5–33)
MCHC RBC AUTO-ENTMCNC: 33.9 G/DL (ref 31.5–36.5)
MCV RBC AUTO: 88 FL (ref 78–100)
MONOCYTES # BLD AUTO: 0.7 10E9/L (ref 0–1.3)
MONOCYTES NFR BLD AUTO: 12.6 %
NEUTROPHILS # BLD AUTO: 3.4 10E9/L (ref 1.6–8.3)
NEUTROPHILS NFR BLD AUTO: 60.1 %
NRBC # BLD AUTO: 0 10*3/UL
NRBC BLD AUTO-RTO: 0 /100
PHOSPHATE SERPL-MCNC: 3 MG/DL (ref 2.5–4.5)
PLATELET # BLD AUTO: 176 10E9/L (ref 150–450)
POTASSIUM SERPL-SCNC: 3.9 MMOL/L (ref 3.4–5.3)
PROT SERPL-MCNC: 6.9 G/DL (ref 6.8–8.8)
RADIOLOGIST FLAGS: NORMAL
RBC # BLD AUTO: 4.8 10E12/L (ref 4.4–5.9)
SODIUM SERPL-SCNC: 130 MMOL/L (ref 133–144)
WBC # BLD AUTO: 5.7 10E9/L (ref 4–11)

## 2020-03-06 PROCEDURE — 99284 EMERGENCY DEPT VISIT MOD MDM: CPT | Mod: 25 | Performed by: EMERGENCY MEDICINE

## 2020-03-06 PROCEDURE — 80053 COMPREHEN METABOLIC PANEL: CPT | Performed by: EMERGENCY MEDICINE

## 2020-03-06 PROCEDURE — 71250 CT THORAX DX C-: CPT

## 2020-03-06 PROCEDURE — 83735 ASSAY OF MAGNESIUM: CPT | Performed by: EMERGENCY MEDICINE

## 2020-03-06 PROCEDURE — 85025 COMPLETE CBC W/AUTO DIFF WBC: CPT | Performed by: EMERGENCY MEDICINE

## 2020-03-06 PROCEDURE — 25800030 ZZH RX IP 258 OP 636: Performed by: EMERGENCY MEDICINE

## 2020-03-06 PROCEDURE — 99284 EMERGENCY DEPT VISIT MOD MDM: CPT | Mod: Z6 | Performed by: EMERGENCY MEDICINE

## 2020-03-06 PROCEDURE — 96360 HYDRATION IV INFUSION INIT: CPT | Performed by: EMERGENCY MEDICINE

## 2020-03-06 PROCEDURE — 84100 ASSAY OF PHOSPHORUS: CPT | Performed by: EMERGENCY MEDICINE

## 2020-03-06 RX ORDER — SODIUM CHLORIDE 1 G/1
1 TABLET ORAL 2 TIMES DAILY
Qty: 60 TABLET | Refills: 0 | Status: SHIPPED | OUTPATIENT
Start: 2020-03-06 | End: 2020-05-20

## 2020-03-06 RX ADMIN — SODIUM CHLORIDE 1000 ML: 9 INJECTION, SOLUTION INTRAVENOUS at 11:08

## 2020-03-06 NOTE — TELEPHONE ENCOUNTER
Spoke to [facility] charge nurse, Rush, and provided soft hand off.    Fiorella Deshpande, BSN, RN, PHN

## 2020-03-06 NOTE — RESULT ENCOUNTER NOTE
Labs borderline suspicious for SIADH. Otherwise just not getting enough salt. He reports intake of significant salt. Will order for IV saline infusion and may need CT lungs if labs still suspicious for SIADH. But will start with IV fluids, then recheck. Continue to take the salt as he has in his diet as well.  Roberta Beaver MD

## 2020-03-06 NOTE — TELEPHONE ENCOUNTER
Call for infusion and they can not get him in today- will have RN call to triage and suggest the ED.

## 2020-03-06 NOTE — TELEPHONE ENCOUNTER
Huddled with Dr. Beaver. Needs to return to [facility] for infusion today. His sodium has dropped and he has spoken to Dr. Beaver, reports he is literally 'licking a salt block.'    Called patient, explained above. He will call family now and see about getting a ride to the hospital.  Told him I would call him back in an hour or so and check on his progress, he should not wait until tomorrow, which he would like to do.    Fiorella Deshpande, RODN, RN, PHN

## 2020-03-06 NOTE — ED AVS SNAPSHOT
[facility]  [address] [place] [address]  Phone:  [phone]  Fax:  [phone]                                    Jerry Rodriguez   MRN: [id number]    Department:  [facility]   Date of Visit:  3/6/2020           After Visit Summary Signature Page    I have received my discharge instructions, and my questions have been answered. I have discussed any challenges I see with this plan with the nurse or doctor.    ..........................................................................................................................................  Patient/Patient Representative Signature      ..........................................................................................................................................  Patient Representative Print Name and Relationship to Patient    ..................................................               ................................................  Date                                   Time    ..........................................................................................................................................  Reviewed by Signature/Title    ...................................................              ..............................................  Date                                               Time          22EPIC Rev 08/18

## 2020-03-06 NOTE — DISCHARGE INSTRUCTIONS
Continue to generously use salt.    There were some pulmonary nodules on your CT scan.  It is recommended that you have a follow-up CT scan in 6 months.  I will let Dr. Beaver know.    You could also take the sodium chloride/salt tablets twice daily.    Please see Dr. Beaver in 2 weeks for another recheck of your sodium.    Return at anytime for worsening, changes or concerns.    I hope that you start to improve quickly!!  It was very nice to meet you!!

## 2020-03-06 NOTE — TELEPHONE ENCOUNTER
So it appears that Jerry is even more hyponatremic and he is reporting taking in a lot of salt. The symptoms do suggest he should get 1 L IV isotonic saline and recheck the levels at least 24 hours later.  Will order if he wants to do this with infusion. But as it is Friday, if he cannot get this today, he may feel better doing this in ED rather than waiting the weekend.  Roberta Beaver MD

## 2020-03-07 NOTE — ED PROVIDER NOTES
"  History     Chief Complaint   Patient presents with     Abnormal Labs     HPI  History per patient and medical records    This is an 85-year-old male, history as below, presenting with abnormal labs.  Patient was seen in this ED on 2/29/2020 with complaints of intermittent dizziness.  He described his dizziness as a woozy feeling with some tingling in his arm and weakness in his legs.  At the time, he was noted to have mild hyponatremia with a sodium of 131.  He followed up with his primary care provider on 3/4/2020 and had labs done with concern for possible SIADH versus low salt intake.  His sodium was noted to be 127.    Patient was called today to have a liter of normal saline.  Because this was not able to be scheduled in infusion services he came to the ED.  He denies any dizzy spells in the last couple of days except for today \"when they got him all worked up\".  No headache, nausea, vomiting, diarrhea, other bowel or bladder changes.  He denies any chest pain, new shortness of breath.  He is a chronic smoker.  He denies being off balance but states that he takes his wife's cane or \"a pitchfork when he is outside\" to help steady himself when he walks.    Patient notes that he has increased his sodium intake.  He pours a little salt onto his hand and licks it and also add salt to his food.  He usually does this at least twice daily.      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  Moderate at L4/5.  MRI 7/2014       Degeneration of lumbar intervertebral disc 08/01/2014     Priority: Medium     HTN, goal below 140/90 03/31/2014     Priority: Medium     Vertigo 03/31/2014     Priority: Medium     Adenomatous polyp of colon 03/31/2014     Priority: Medium "     Bullous dermatitis 08/13/2013     Priority: Medium     NOEMI positive 02/20/2013     Priority: Medium     Hammer toe 02/06/2013     Priority: Medium     Corn 02/06/2013     Priority: Medium     Degenerative arthritis of finger 05/23/2011     Priority: Medium     (Problem list name updated by automated process. Provider to review and confirm.)       Hyponatremia 01/13/2011     Priority: Medium     Tobacco use disorder 02/13/2009     Priority: Medium     HTN (hypertension) 01/28/2009     Priority: Medium     Anxiety state      Priority: Medium     Problem list name updated by automated process. Provider to review       Stricture and stenosis of esophagus 08/02/2004     Priority: Medium     Primary localized osteoarthrosis of shoulder region 01/07/2004     Priority: Medium     Problem list name updated by automated process. Provider to review       Chronic rhinitis 04/05/2002     Priority: Medium     Generalized osteoarthrosis, unspecified site 04/05/2002     Priority: Medium     Other atopic dermatitis and related conditions      Priority: Medium        Past Medical History:    Past Medical History:   Diagnosis Date     Anxiety state, unspecified      Arthritis      Hypertension      Hyponatremia      Inguinal hernia with obstruction, without mention of gangrene, unilateral or unspecified, (not specified as recurrent)      Inguinal hernia without mention of obstruction or gangrene, unilateral or unspecified, (not specified as recurrent) 11/07/2000     Other joint derangement, not elsewhere classified, forearm 1980     Pathologic fracture of neck of femur (H)      Stricture and stenosis of esophagus 2004       Past Surgical History:    Past Surgical History:   Procedure Laterality Date     C OSTEOTOMY HIP/FEMUR  1980    traumatic fracture right hip     COLONOSCOPY  10/17/2007    Repeat Colonoscopy in 5 years for surveillance.     COLONOSCOPY N/A 1/24/2018    Procedure: COLONOSCOPY;;  Surgeon: Don Arambula,  MD;  Location: [facility]     ESOPHAGOSCOPY, GASTROSCOPY, DUODENOSCOPY (EGD), COMBINED  1/4/2011    COMBINED ESOPHAGOSCOPY, GASTROSCOPY, DUODENOSCOPY (EGD), ESOPHAGEAL DILATION BALLOON LESS THAN 30MM performed by ELEN BUSBY at  GI     ESOPHAGOSCOPY, GASTROSCOPY, DUODENOSCOPY (EGD), COMBINED N/A 9/25/2015    Procedure: COMBINED ESOPHAGOSCOPY, GASTROSCOPY, DUODENOSCOPY (EGD), BIOPSY SINGLE OR MULTIPLE;  Surgeon: Markell Lawson MD;  Location: [facility]     ESOPHAGOSCOPY, GASTROSCOPY, DUODENOSCOPY (EGD), COMBINED N/A 1/24/2018    Procedure: COMBINED ESOPHAGOSCOPY, GASTROSCOPY, DUODENOSCOPY (EGD), BIOPSY SINGLE OR MULTIPLE;;  Surgeon: Don Arambula MD;  Location: [facility]     ESOPHAGOSCOPY, GASTROSCOPY, DUODENOSCOPY (EGD), DILATATION, COMBINED N/A 1/24/2018    Procedure: COMBINED ESOPHAGOSCOPY, GASTROSCOPY, DUODENOSCOPY (EGD), DILATATION;  Esophagogastroduodenoscopy with Dilatation and Biopsy by Biopsy, Colonoscopy;  Surgeon: Don Arambula MD;  Location:  GI     HC COLONOSCOPY THRU STOMA, DIAGNOSTIC  11/15/2000    Recurrent abdominal pain     HC COLONOSCOPY W/WO BRUSH/WASH  07/19/2004     HC REPAIR INCISIONAL HERNIA,REDUCIBLE  11/10/2000    Hernia Repair, Incisional, Unilateral, Left inguinal hernia and umbilical hernia     HC UGI ENDOSCOPY DIAG W OR W/O BRUSH/WASH  07/19/2004     HC UGI ENDOSCOPY DIAG W OR W/O BRUSH/WASH  05/24/2005    Peptic stricture of esophagus, dilated to 18 mm. Erosive reflux esophagitis. Hiatal hernia. Erosive duodenitis.     HC UGI ENDOSCOPY, SIMPLE EXAM  02/20/07     HC UGI ENDOSCOPY, SIMPLE EXAM  12/02/08    Schiatzky's ring, dialated, PPI indefinitely     HC UGI ENDOSCOPY, SIMPLE EXAM  12/15/2009    benign stricture with dialation     LAPAROSCOPIC APPENDECTOMY  09/15/2005       Family History:    Family History   Problem Relation Age of Onset     Cancer Sister         brain tumor     Diabetes Sister      Alzheimer Disease Sister        Social History:  Marital Status:    [2]  Social History     Tobacco Use     Smoking status: Current Every Day Smoker     Packs/day: 1.00     Years: 52.00     Pack years: 52.00     Types: Cigarettes     Smokeless tobacco: Current User     Types: Chew     Tobacco comment: occasionally/ 1.5tin qwk   Substance Use Topics     Alcohol use: No     Comment: quit 7-1980     Drug use: No        Medications:    No current facility-administered medications on file prior to encounter.   busPIRone (BUSPAR) 5 MG tablet, TAKE ONE TABLET BY MOUTH TWICE DAILY   dimenhyDRINATE (MOTION SICKNESS RELIEF) 50 MG tablet, Take 50 mg by mouth nightly as needed for sleep  fluticasone (FLONASE) 50 MCG/ACT spray, Spray 1-2 sprays into both nostrils daily  multivitamin, therapeutic with minerals (MULTI-VITAMIN) TABS, Take 1 tablet by mouth daily  omeprazole (PRILOSEC) 20 MG DR capsule, Take 1 capsule (20 mg) by mouth 2 times daily  Spacer/Aero-Holding Chambers (VALVED HOLDING CHAMBER) JULISSA, 1 Device 2 times daily To be used with flovent  timolol (TIMOPTIC) 0.5 % ophthalmic solution, Apply 1 drop to eye 2 times daily  TIMOLOL 0.25 % OP SOLN, Reported on 3/29/2017  VENTOLIN  (90 Base) MCG/ACT inhaler, INHALE TWO PUFFS BY MOUTH EVERY SIX HOURS AS NEEDED FOR SHORTNESS OF BREATH OR WHEEZING. LAST REFILL UNTIL SEEN            Review of Systems   All other ROS reviewed and are negative or non-contributory except as stated in HPI.     Physical Exam   BP: (!) 156/79  Pulse: 74  Heart Rate: 67  Temp: 97.9  F (36.6  C)  Resp: 18  Weight: 83.9 kg (185 lb)  SpO2: 96 %      Physical Exam  Vitals signs and nursing note reviewed.   Constitutional:       Appearance: Normal appearance. He is normal weight.      Comments: Very pleasant, older, chatty gentleman, somewhat hard of hearing.  Smells very strongly of smoke.   HENT:      Head: Normocephalic.      Nose: Nose normal.      Mouth/Throat:      Mouth: Mucous membranes are moist.      Pharynx: Oropharynx is clear.   Eyes:      Extraocular  Movements: Extraocular movements intact.      Conjunctiva/sclera: Conjunctivae normal.   Neck:      Musculoskeletal: Normal range of motion.   Cardiovascular:      Rate and Rhythm: Normal rate and regular rhythm.      Pulses: Normal pulses.      Heart sounds: Normal heart sounds.   Pulmonary:      Effort: Pulmonary effort is normal.      Breath sounds: Normal breath sounds.   Abdominal:      Palpations: Abdomen is soft.      Tenderness: There is no abdominal tenderness.   Musculoskeletal: Normal range of motion.         General: No swelling.      Right lower leg: No edema.      Left lower leg: No edema.   Skin:     General: Skin is warm and dry.      Coloration: Skin is not pale.      Comments: Fingers stained with tobacco   Neurological:      General: No focal deficit present.      Mental Status: He is alert.   Psychiatric:         Mood and Affect: Mood normal.         Behavior: Behavior normal.         ED Course (with Medical Decision Making)    Pt seen and examined by me.  RN and EPIC notes reviewed.      Patient with hyponatremia, sent after clinic did some labs.    Going to recheck his labs and give him a small amount of saline slowly.  In addition, because he is such a heavy smoker I am going to do a CT scan of his chest.    Sodium is 130.  He received 1 L of saline.  CT scan shows cholelithiasis, coronary disease, emphysema, some pleural calcifications and some nodules as noted below.  He will need a six-month follow-up if he desires.  I will send a message to his primary care provider.    I am going to give the patient some salt tablets to take twice daily if he prefers over looking salt.  I am also going to refer him back to his primary care provider for a recheck.  Return at anytime for concerns.        Procedures    Results for orders placed or performed during the hospital encounter of 03/06/20 (from the past 24 hour(s))   CBC with platelets differential   Result Value Ref Range    WBC 5.7 4.0 - 11.0  10e9/L    RBC Count 4.80 4.4 - 5.9 10e12/L    Hemoglobin 14.3 13.3 - 17.7 g/dL    Hematocrit 42.2 40.0 - 53.0 %    MCV 88 78 - 100 fl    MCH 29.8 26.5 - 33.0 pg    MCHC 33.9 31.5 - 36.5 g/dL    RDW 12.2 10.0 - 15.0 %    Platelet Count 176 150 - 450 10e9/L    Diff Method Automated Method     % Neutrophils 60.1 %    % Lymphocytes 23.2 %    % Monocytes 12.6 %    % Eosinophils 3.2 %    % Basophils 0.5 %    % Immature Granulocytes 0.4 %    Nucleated RBCs 0 0 /100    Absolute Neutrophil 3.4 1.6 - 8.3 10e9/L    Absolute Lymphocytes 1.3 0.8 - 5.3 10e9/L    Absolute Monocytes 0.7 0.0 - 1.3 10e9/L    Absolute Basophils 0.0 0.0 - 0.2 10e9/L    Abs Immature Granulocytes 0.0 0 - 0.4 10e9/L    Absolute Nucleated RBC 0.0    Comprehensive metabolic panel   Result Value Ref Range    Sodium 130 (L) 133 - 144 mmol/L    Potassium 3.9 3.4 - 5.3 mmol/L    Chloride 97 94 - 109 mmol/L    Carbon Dioxide 25 20 - 32 mmol/L    Anion Gap 8 3 - 14 mmol/L    Glucose 98 70 - 99 mg/dL    Urea Nitrogen 7 7 - 30 mg/dL    Creatinine 0.90 0.66 - 1.25 mg/dL    GFR Estimate 77 >60 mL/min/[1.73_m2]    GFR Estimate If Black 90 >60 mL/min/[1.73_m2]    Calcium 8.3 (L) 8.5 - 10.1 mg/dL    Bilirubin Total 0.7 0.2 - 1.3 mg/dL    Albumin 3.2 (L) 3.4 - 5.0 g/dL    Protein Total 6.9 6.8 - 8.8 g/dL    Alkaline Phosphatase 86 40 - 150 U/L    ALT 15 0 - 70 U/L    AST 14 0 - 45 U/L   Magnesium   Result Value Ref Range    Magnesium 2.0 1.6 - 2.3 mg/dL   Phosphorus   Result Value Ref Range    Phosphorus 3.0 2.5 - 4.5 mg/dL   Chest CT w/o contrast   Result Value Ref Range    Radiologist flags Lung nodule     Narrative    CT CHEST WITHOUT CONTRAST 3/6/2020 11:01 AM    CLINICAL HISTORY: History of smoking with new hyponatremia/SIADH.  Evaluate for pulmonary malignancy/mass.    TECHNIQUE: CT chest without IV contrast. Multiplanar reformats were  obtained. Dose reduction techniques were used.  CONTRAST: None.    COMPARISON: Chest radiograph 2/17/2019 and  7/16/2018    FINDINGS:   LUNGS AND PLEURA: 6 mm mean diameter ovoid opacity along the right  minor fissure having more of a flat or linear appearance on coronal  imaging suggesting a possible intrafissural lymph node or fissural  thickening rather than a true pulmonary nodule (6-125). Predominantly  linear subpleural nodular opacity in the lateral aspect of the right  middle lobe and measuring mean diameter of 6 mm (6-189). 3 mm nodule  central aspect right middle lobe (6-193). No other pulmonary nodules  are evident. No focal infiltrate or consolidation. Minimal scattered  areas of subpleural fibrotic change. Minimal-moderate centrilobular  and paraseptal emphysema. Mild pleural calcifications along the  posterior superior aspect of the right hemithorax. No significant  pleural fluid.    MEDIASTINUM/AXILLAE: No mediastinal or hilar lymphadenopathy. Normal  heart size. No pericardial fluid. Vascular calcification including  moderate coronary artery calcification. Normal caliber thoracic aorta.  Normal caliber esophagus.    UPPER ABDOMEN: Cholelithiasis. Right upper pole renal cystic lesion  having Hounsfield units most compatible with a cystic lesion, no  further follow-up is necessary per ACR incidental findings many  criteria.    MUSCULOSKELETAL: Degenerative hypertrophic changes in the spine. Old  healed posterior right rib fractures.      Impression    IMPRESSION:   1.  No evidence for distinct spiculated pulmonary mass or suspicious  nodule. Well circumscribed nodule/nodular opacities involve the right  upper lobe adjacent to the right minor fissure as well as the  subpleural aspect of the right middle lobe and the central right  middle lobe. These have predominantly linear features on coronal  imaging and may represent areas of fissural thickening or subpleural  linear scarring. These still should undergo continued CT follow-up per  Fleischner society criteria in 6 months given that the largest has a  mean  diameter of 6 mm and they do have some underlying nodular  component.  2.  Minimal pleural calcification and thickening posterior aspect  right upper hemithorax may indicate prior asbestos exposure or the  result of prior hemothorax or empyema. No significant pleural fluid.  3.  Minimal to moderate central lobular and paraseptal emphysema.  4.  Moderate coronary artery calcification.  5.  Cholelithiasis.    [Recommend Follow Up: Lung nodule]    This report will be copied to the [facility] to ensure a  provider acknowledges the finding.   1.    CURT L BEHRNS, MD       Medications   0.9% sodium chloride BOLUS (0 mLs Intravenous Stopped 3/6/20 1238)       Assessments & Plan      I have reviewed the findings, diagnosis, plan and need for follow up with the patient.    Discharge Medication List as of 3/6/2020 12:38 PM      START taking these medications    Details   sodium chloride 1 GM tablet Take 1 tablet (1 g) by mouth 2 times daily, Disp-60 tablet, R-0, Local Print             Final diagnoses:   Hyponatremia   Pulmonary nodules   Calculus of gallbladder without cholecystitis without obstruction     Disposition: Patient discharged home in stable condition.  Plan as above.  Return for concerns.     Note: Chart documentation done in part with Dragon Voice Recognition software. Although reviewed after completion, some word and grammatical errors may remain.       3/6/2020   [facility] EMERGENCY DEPARTMENT     Laura Herrera MD  03/07/20 0029

## 2020-04-07 ENCOUNTER — TELEPHONE (OUTPATIENT)
Dept: FAMILY MEDICINE | Facility: OTHER | Age: 85
End: 2020-04-07

## 2020-04-07 NOTE — TELEPHONE ENCOUNTER
[facility] calling.  She states the Gudelia is on back order and his insurance will pay for Proair.  They just need a verbal okay for this.  This is the prescription from 11-20-19.  Please call [facility] with verbal ok. [phone].  Thank you.

## 2020-05-07 ENCOUNTER — TELEPHONE (OUTPATIENT)
Dept: FAMILY MEDICINE | Facility: OTHER | Age: 85
End: 2020-05-07

## 2020-05-07 NOTE — TELEPHONE ENCOUNTER
Patient would like to see AE at the end of the month, scheduled.   Next 5 appointments (look out 90 days)    May 26, 2020 10:30 AM CDT  [facility] Visit with Rober[facility] Beaver MD  [facility] [address] [place] [id number]  [phone]        Niurka Nava MA

## 2020-05-07 NOTE — TELEPHONE ENCOUNTER
Reason for Call:  Other - medication    Detailed comments: patient calling.  He would like to come in clinic to see dr Beaver.  He states he was supposed to come in after 2 weeks back in march but with the whole Covid 19 he did not want to go anywhere.   He would like to know if he should continue taking the sodium pills?  He would like to go off of them.  I tried to explain the phone/video visits but he had no idea what I was saying and seemed like he was getting irritated with it.  He wants to make an appt when Dr Beaver is in clinic the end of May.  Please advise.  Thank you    Phone Number Patient can be reached at: Home number on file [phone] (home)    Best Time: any    Can we leave a detailed message on this number? YES    Call taken on 5/7/2020 at 3:05 PM by Viktoria Terry

## 2020-05-07 NOTE — TELEPHONE ENCOUNTER
Certainly can try to offer again to visit with me by phone, but this may just be easier if I saw him as he is easily confused.  Roberta Beaver MD

## 2020-05-20 ENCOUNTER — APPOINTMENT (OUTPATIENT)
Dept: GENERAL RADIOLOGY | Facility: CLINIC | Age: 85
DRG: 470 | End: 2020-05-20
Attending: EMERGENCY MEDICINE
Payer: COMMERCIAL

## 2020-05-20 ENCOUNTER — HOSPITAL ENCOUNTER (INPATIENT)
Facility: CLINIC | Age: 85
LOS: 3 days | Discharge: NURSING, REHAB OR LONG-TERM CARE | DRG: 470 | End: 2020-05-23
Attending: EMERGENCY MEDICINE | Admitting: FAMILY MEDICINE
Payer: COMMERCIAL

## 2020-05-20 DIAGNOSIS — J31.0 CHRONIC RHINITIS: ICD-10-CM

## 2020-05-20 DIAGNOSIS — Z20.828 EXPOSURE TO SARS-ASSOCIATED CORONAVIRUS: ICD-10-CM

## 2020-05-20 DIAGNOSIS — F17.200 TOBACCO USE DISORDER: ICD-10-CM

## 2020-05-20 DIAGNOSIS — R03.0 ELEVATED BLOOD PRESSURE READING WITHOUT DIAGNOSIS OF HYPERTENSION: ICD-10-CM

## 2020-05-20 DIAGNOSIS — K21.00 GASTROESOPHAGEAL REFLUX DISEASE WITH ESOPHAGITIS: ICD-10-CM

## 2020-05-20 DIAGNOSIS — S72.002A CLOSED FRACTURE OF LEFT HIP, INITIAL ENCOUNTER (H): ICD-10-CM

## 2020-05-20 DIAGNOSIS — R06.2 WHEEZING: ICD-10-CM

## 2020-05-20 DIAGNOSIS — H40.9 GLAUCOMA, UNSPECIFIED GLAUCOMA TYPE, UNSPECIFIED LATERALITY: ICD-10-CM

## 2020-05-20 DIAGNOSIS — F41.1 ANXIETY STATE: ICD-10-CM

## 2020-05-20 DIAGNOSIS — S72.002A CLOSED FRACTURE OF NECK OF LEFT FEMUR, INITIAL ENCOUNTER (H): ICD-10-CM

## 2020-05-20 DIAGNOSIS — Z96.649 S/P HIP HEMIARTHROPLASTY: Primary | ICD-10-CM

## 2020-05-20 DIAGNOSIS — K22.2 STRICTURE AND STENOSIS OF ESOPHAGUS: ICD-10-CM

## 2020-05-20 DIAGNOSIS — J41.0 SIMPLE CHRONIC BRONCHITIS (H): ICD-10-CM

## 2020-05-20 DIAGNOSIS — R05.9 COUGH: ICD-10-CM
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
## 2020-05-20 DIAGNOSIS — K21.9 GASTROESOPHAGEAL REFLUX DISEASE WITHOUT ESOPHAGITIS: ICD-10-CM

## 2020-05-20 LAB
ANION GAP SERPL CALCULATED.3IONS-SCNC: 4 MMOL/L (ref 3–14)
BASOPHILS # BLD AUTO: 0 10E9/L (ref 0–0.2)
BASOPHILS NFR BLD AUTO: 0.3 %
BUN SERPL-MCNC: 8 MG/DL (ref 7–30)
CALCIUM SERPL-MCNC: 8 MG/DL (ref 8.5–10.1)
CHLORIDE SERPL-SCNC: 98 MMOL/L (ref 94–109)
CO2 SERPL-SCNC: 28 MMOL/L (ref 20–32)
CREAT SERPL-MCNC: 1.05 MG/DL (ref 0.66–1.25)
DIFFERENTIAL METHOD BLD: NORMAL
EOSINOPHIL NFR BLD AUTO: 2 %
ERYTHROCYTE [DISTWIDTH] IN BLOOD BY AUTOMATED COUNT: 12.5 % (ref 10–15)
GFR SERPL CREATININE-BSD FRML MDRD: 64 ML/MIN/{1.73_M2}
GLUCOSE SERPL-MCNC: 105 MG/DL (ref 70–99)
HCT VFR BLD AUTO: 43.1 % (ref 40–53)
HGB BLD-MCNC: 14.4 G/DL (ref 13.3–17.7)
IMM GRANULOCYTES # BLD: 0.1 10E9/L (ref 0–0.4)
IMM GRANULOCYTES NFR BLD: 0.7 %
INR PPP: 1.02 (ref 0.86–1.14)
LYMPHOCYTES # BLD AUTO: 1.3 10E9/L (ref 0.8–5.3)
LYMPHOCYTES NFR BLD AUTO: 15.4 %
MCH RBC QN AUTO: 29.3 PG (ref 26.5–33)
MCHC RBC AUTO-ENTMCNC: 33.4 G/DL (ref 31.5–36.5)
MCV RBC AUTO: 88 FL (ref 78–100)
MONOCYTES # BLD AUTO: 0.8 10E9/L (ref 0–1.3)
MONOCYTES NFR BLD AUTO: 8.9 %
NEUTROPHILS # BLD AUTO: 6.2 10E9/L (ref 1.6–8.3)
NEUTROPHILS NFR BLD AUTO: 72.7 %
NRBC # BLD AUTO: 0 10*3/UL
NRBC BLD AUTO-RTO: 0 /100
PLATELET # BLD AUTO: 178 10E9/L (ref 150–450)
POTASSIUM SERPL-SCNC: 4 MMOL/L (ref 3.4–5.3)
RBC # BLD AUTO: 4.91 10E12/L (ref 4.4–5.9)
SODIUM SERPL-SCNC: 130 MMOL/L (ref 133–144)
WBC # BLD AUTO: 8.6 10E9/L (ref 4–11)

## 2020-05-20 PROCEDURE — 85025 COMPLETE CBC W/AUTO DIFF WBC: CPT | Performed by: EMERGENCY MEDICINE

## 2020-05-20 PROCEDURE — 25000128 H RX IP 250 OP 636: Performed by: EMERGENCY MEDICINE

## 2020-05-20 PROCEDURE — 99285 EMERGENCY DEPT VISIT HI MDM: CPT | Mod: 25 | Performed by: EMERGENCY MEDICINE

## 2020-05-20 PROCEDURE — 2894A VOIDCORRECT: CPT | Mod: 25 | Performed by: EMERGENCY MEDICINE

## 2020-05-20 PROCEDURE — 96376 TX/PRO/DX INJ SAME DRUG ADON: CPT | Performed by: EMERGENCY MEDICINE

## 2020-05-20 PROCEDURE — 25000128 H RX IP 250 OP 636: Performed by: FAMILY MEDICINE

## 2020-05-20 PROCEDURE — 80048 BASIC METABOLIC PNL TOTAL CA: CPT | Performed by: EMERGENCY MEDICINE

## 2020-05-20 PROCEDURE — 73502 X-RAY EXAM HIP UNI 2-3 VIEWS: CPT | Mod: TC

## 2020-05-20 PROCEDURE — 99223 1ST HOSP IP/OBS HIGH 75: CPT | Mod: AI | Performed by: FAMILY MEDICINE

## 2020-05-20 PROCEDURE — 85610 PROTHROMBIN TIME: CPT | Performed by: EMERGENCY MEDICINE

## 2020-05-20 PROCEDURE — 96374 THER/PROPH/DIAG INJ IV PUSH: CPT | Performed by: EMERGENCY MEDICINE

## 2020-05-20 PROCEDURE — 25000132 ZZH RX MED GY IP 250 OP 250 PS 637: Performed by: FAMILY MEDICINE

## 2020-05-20 PROCEDURE — 99207 ZZC CDG-MDM COMPONENT: MEETS MODERATE - UP CODED: CPT | Performed by: FAMILY MEDICINE

## 2020-05-20 PROCEDURE — 12000000 ZZH R&B MED SURG/OB

## 2020-05-20 PROCEDURE — 25000125 ZZHC RX 250: Performed by: FAMILY MEDICINE

## 2020-05-20 PROCEDURE — 93005 ELECTROCARDIOGRAM TRACING: CPT | Performed by: EMERGENCY MEDICINE

## 2020-05-20 PROCEDURE — 25800030 ZZH RX IP 258 OP 636: Performed by: FAMILY MEDICINE

## 2020-05-20 RX ORDER — NALOXONE HYDROCHLORIDE 0.4 MG/ML
.1-.4 INJECTION, SOLUTION INTRAMUSCULAR; INTRAVENOUS; SUBCUTANEOUS
Status: DISCONTINUED | OUTPATIENT
Start: 2020-05-20 | End: 2020-05-21

## 2020-05-20 RX ORDER — ONDANSETRON 2 MG/ML
4 INJECTION INTRAMUSCULAR; INTRAVENOUS EVERY 6 HOURS PRN
Status: DISCONTINUED | OUTPATIENT
Start: 2020-05-20 | End: 2020-05-21

## 2020-05-20 RX ORDER — CEFAZOLIN SODIUM 2 G/100ML
2 INJECTION, SOLUTION INTRAVENOUS
Status: COMPLETED | OUTPATIENT
Start: 2020-05-20 | End: 2020-05-21

## 2020-05-20 RX ORDER — LIDOCAINE 40 MG/G
CREAM TOPICAL
Status: DISCONTINUED | OUTPATIENT
Start: 2020-05-20 | End: 2020-05-22

## 2020-05-20 RX ORDER — HYDROMORPHONE HYDROCHLORIDE 1 MG/ML
0.2 INJECTION, SOLUTION INTRAMUSCULAR; INTRAVENOUS; SUBCUTANEOUS
Status: DISCONTINUED | OUTPATIENT
Start: 2020-05-20 | End: 2020-05-20

## 2020-05-20 RX ORDER — ALBUTEROL SULFATE 0.83 MG/ML
2.5 SOLUTION RESPIRATORY (INHALATION) EVERY 4 HOURS PRN
Status: DISCONTINUED | OUTPATIENT
Start: 2020-05-20 | End: 2020-05-23 | Stop reason: HOSPADM

## 2020-05-20 RX ORDER — NICOTINE 21 MG/24HR
1 PATCH, TRANSDERMAL 24 HOURS TRANSDERMAL DAILY
Status: DISCONTINUED | OUTPATIENT
Start: 2020-05-20 | End: 2020-05-23 | Stop reason: HOSPADM

## 2020-05-20 RX ORDER — ACETAMINOPHEN 325 MG/1
650 TABLET ORAL EVERY 4 HOURS PRN
Status: DISCONTINUED | OUTPATIENT
Start: 2020-05-20 | End: 2020-05-21

## 2020-05-20 RX ORDER — SODIUM CHLORIDE 9 MG/ML
INJECTION, SOLUTION INTRAVENOUS CONTINUOUS
Status: DISCONTINUED | OUTPATIENT
Start: 2020-05-20 | End: 2020-05-23 | Stop reason: HOSPADM

## 2020-05-20 RX ORDER — ONDANSETRON 4 MG/1
4 TABLET, ORALLY DISINTEGRATING ORAL EVERY 6 HOURS PRN
Status: DISCONTINUED | OUTPATIENT
Start: 2020-05-20 | End: 2020-05-21

## 2020-05-20 RX ORDER — HYDROMORPHONE HYDROCHLORIDE 1 MG/ML
0.2 INJECTION, SOLUTION INTRAMUSCULAR; INTRAVENOUS; SUBCUTANEOUS
Status: DISCONTINUED | OUTPATIENT
Start: 2020-05-20 | End: 2020-05-21

## 2020-05-20 RX ORDER — TIMOLOL MALEATE 5 MG/ML
1 SOLUTION/ DROPS OPHTHALMIC 2 TIMES DAILY
Status: DISCONTINUED | OUTPATIENT
Start: 2020-05-20 | End: 2020-05-23 | Stop reason: HOSPADM

## 2020-05-20 RX ORDER — BUSPIRONE HYDROCHLORIDE 5 MG/1
5 TABLET ORAL 2 TIMES DAILY
Status: DISCONTINUED | OUTPATIENT
Start: 2020-05-20 | End: 2020-05-23 | Stop reason: HOSPADM

## 2020-05-20 RX ADMIN — TIMOLOL MALEATE 1 DROP: 5 SOLUTION/ DROPS OPHTHALMIC at 22:29

## 2020-05-20 RX ADMIN — HYDROMORPHONE HYDROCHLORIDE 0.2 MG: 1 INJECTION, SOLUTION INTRAMUSCULAR; INTRAVENOUS; SUBCUTANEOUS at 21:38

## 2020-05-20 RX ADMIN — NICOTINE 1 PATCH: 21 PATCH TRANSDERMAL at 22:31

## 2020-05-20 RX ADMIN — BUSPIRONE HYDROCHLORIDE 5 MG: 5 TABLET ORAL at 21:37

## 2020-05-20 RX ADMIN — HYDROMORPHONE HYDROCHLORIDE 0.2 MG: 1 INJECTION, SOLUTION INTRAMUSCULAR; INTRAVENOUS; SUBCUTANEOUS at 23:56

## 2020-05-20 RX ADMIN — ACETAMINOPHEN 650 MG: 325 TABLET, FILM COATED ORAL at 21:37

## 2020-05-20 RX ADMIN — Medication 1 MG: at 22:30

## 2020-05-20 RX ADMIN — HYDROMORPHONE HYDROCHLORIDE 0.2 MG: 1 INJECTION, SOLUTION INTRAMUSCULAR; INTRAVENOUS; SUBCUTANEOUS at 17:08

## 2020-05-20 RX ADMIN — SODIUM CHLORIDE: 9 INJECTION, SOLUTION INTRAVENOUS at 22:31

## 2020-05-20 RX ADMIN — HYDROMORPHONE HYDROCHLORIDE 0.2 MG: 1 INJECTION, SOLUTION INTRAMUSCULAR; INTRAVENOUS; SUBCUTANEOUS at 19:25

## 2020-05-20 NOTE — ED TRIAGE NOTES
PT was in garage working and fell on uneven ground. Pt has uneven length of legs that causes him trouble. Pt has left hip pain.

## 2020-05-20 NOTE — ED PROVIDER NOTES
History     Chief Complaint   Patient presents with     Fall     HPI  Jerry Rodriguez is a 85 year old male who presents with left hip pain.  He was working in his garage when he fell onto his hip area.  Pain is moderate.  He is brought in by paramedics.  He denies other injury.    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  Moderate at L4/5.  MRI 7/2014       Degeneration of lumbar intervertebral disc 08/01/2014     Priority: Medium     HTN, goal below 140/90 03/31/2014     Priority: Medium     Vertigo 03/31/2014     Priority: Medium     Adenomatous polyp of colon 03/31/2014     Priority: Medium     Bullous dermatitis 08/13/2013     Priority: Medium     NOEMI positive 02/20/2013     Priority: Medium     Hammer toe 02/06/2013     Priority: Medium     Corn 02/06/2013     Priority: Medium     Degenerative arthritis of finger 05/23/2011     Priority: Medium     (Problem list name updated by automated process. Provider to review and confirm.)       Hyponatremia 01/13/2011     Priority: Medium     Tobacco use disorder 02/13/2009     Priority: Medium     HTN (hypertension) 01/28/2009     Priority: Medium     Anxiety state      Priority: Medium     Problem list name updated by automated process. Provider to review       Stricture and stenosis of esophagus 08/02/2004     Priority: Medium     Primary localized osteoarthrosis of shoulder region 01/07/2004     Priority: Medium     Problem list name updated by automated process. Provider to review       Chronic rhinitis 04/05/2002     Priority: Medium     Generalized osteoarthrosis, unspecified site 04/05/2002     Priority: Medium     Other atopic dermatitis and related conditions      Priority: Medium        Past  Medical History:    Past Medical History:   Diagnosis Date     Anxiety state, unspecified      Arthritis      Hypertension      Hyponatremia      Inguinal hernia with obstruction, without mention of gangrene, unilateral or unspecified, (not specified as recurrent)      Inguinal hernia without mention of obstruction or gangrene, unilateral or unspecified, (not specified as recurrent) 11/07/2000     Other joint derangement, not elsewhere classified, forearm 1980     Pathologic fracture of neck of femur (H)      Stricture and stenosis of esophagus 2004       Past Surgical History:    Past Surgical History:   Procedure Laterality Date     C OSTEOTOMY HIP/FEMUR  1980    traumatic fracture right hip     COLONOSCOPY  10/17/2007    Repeat Colonoscopy in 5 years for surveillance.     COLONOSCOPY N/A 1/24/2018    Procedure: COLONOSCOPY;;  Surgeon: Don Arambula MD;  Location:  GI     ESOPHAGOSCOPY, GASTROSCOPY, DUODENOSCOPY (EGD), COMBINED  1/4/2011    COMBINED ESOPHAGOSCOPY, GASTROSCOPY, DUODENOSCOPY (EGD), ESOPHAGEAL DILATION BALLOON LESS THAN 30MM performed by ELEN BUSBY at  GI     ESOPHAGOSCOPY, GASTROSCOPY, DUODENOSCOPY (EGD), COMBINED N/A 9/25/2015    Procedure: COMBINED ESOPHAGOSCOPY, GASTROSCOPY, DUODENOSCOPY (EGD), BIOPSY SINGLE OR MULTIPLE;  Surgeon: Markell Lawson MD;  Location:  GI     ESOPHAGOSCOPY, GASTROSCOPY, DUODENOSCOPY (EGD), COMBINED N/A 1/24/2018    Procedure: COMBINED ESOPHAGOSCOPY, GASTROSCOPY, DUODENOSCOPY (EGD), BIOPSY SINGLE OR MULTIPLE;;  Surgeon: Don Arambula MD;  Location:  GI     ESOPHAGOSCOPY, GASTROSCOPY, DUODENOSCOPY (EGD), DILATATION, COMBINED N/A 1/24/2018    Procedure: COMBINED ESOPHAGOSCOPY, GASTROSCOPY, DUODENOSCOPY (EGD), DILATATION;  Esophagogastroduodenoscopy with Dilatation and Biopsy by Biopsy, Colonoscopy;  Surgeon: Don Arambula MD;  Location:  GI      COLONOSCOPY THRU STOMA, DIAGNOSTIC  11/15/2000    Recurrent abdominal pain     HC  COLONOSCOPY W/WO BRUSH/WASH  07/19/2004      REPAIR INCISIONAL HERNIA,REDUCIBLE  11/10/2000    Hernia Repair, Incisional, Unilateral, Left inguinal hernia and umbilical hernia      UGI ENDOSCOPY DIAG W OR W/O BRUSH/WASH  07/19/2004     HC UGI ENDOSCOPY DIAG W OR W/O BRUSH/WASH  05/24/2005    Peptic stricture of esophagus, dilated to 18 mm. Erosive reflux esophagitis. Hiatal hernia. Erosive duodenitis.      UGI ENDOSCOPY, SIMPLE EXAM  02/20/07     HC UGI ENDOSCOPY, SIMPLE EXAM  12/02/08    Schiatzky's ring, dialated, PPI indefinitely      UGI ENDOSCOPY, SIMPLE EXAM  12/15/2009    benign stricture with dialation     LAPAROSCOPIC APPENDECTOMY  09/15/2005       Family History:    Family History   Problem Relation Age of Onset     Cancer Sister         brain tumor     Diabetes Sister      Alzheimer Disease Sister        Social History:  Marital Status:   [2]  Social History     Tobacco Use     Smoking status: Current Every Day Smoker     Packs/day: 1.00     Years: 52.00     Pack years: 52.00     Types: Cigarettes     Smokeless tobacco: Current User     Types: Chew     Tobacco comment: occasionally/ 1.5tin qwk   Substance Use Topics     Alcohol use: No     Comment: quit 7-1980     Drug use: No        Medications:    busPIRone (BUSPAR) 5 MG tablet  fluticasone (FLONASE) 50 MCG/ACT spray  multivitamin, therapeutic with minerals (MULTI-VITAMIN) TABS  timolol (TIMOPTIC) 0.5 % ophthalmic solution  VENTOLIN  (90 Base) MCG/ACT inhaler  Spacer/Aero-Holding Chambers (VALVED HOLDING CHAMBER) JULISSA          Review of Systems  All other systems are reviewed and are negative    Physical Exam   BP: (!) 195/87  Pulse: 69  Heart Rate: 72  Temp: 98.1  F (36.7  C)  Resp: 18  Weight: 85.3 kg (188 lb)  SpO2: 97 %      Physical Exam  Constitutional:       General: He is not in acute distress.     Appearance: He is not diaphoretic.   HENT:      Head: Atraumatic.   Eyes:      Pupils: Pupils are equal, round, and reactive to  light.   Cardiovascular:      Rate and Rhythm: Regular rhythm.      Heart sounds: Normal heart sounds.   Pulmonary:      Effort: No respiratory distress.      Breath sounds: Normal breath sounds.   Chest:      Chest wall: No tenderness.   Abdominal:      General: Bowel sounds are normal.      Palpations: Abdomen is soft.      Tenderness: There is no abdominal tenderness.   Musculoskeletal: Normal range of motion.         General: No tenderness.      Cervical back: He exhibits no tenderness.      Thoracic back: He exhibits no tenderness.      Lumbar back: He exhibits no tenderness.      Comments: Left leg is shortened and externally rotated.  There is tenderness in the femur region.  There is extreme pain with even minimal range of motion in the hip region.  Distal CMS to the foot intact.   Skin:     Findings: No abrasion or laceration.   Neurological:      Mental Status: He is alert and oriented to person, place, and time.         ED Course        Procedures               Critical Care time:  none               Results for orders placed or performed during the hospital encounter of 05/20/20 (from the past 24 hour(s))   CBC with platelets differential   Result Value Ref Range    WBC 8.6 4.0 - 11.0 10e9/L    RBC Count 4.91 4.4 - 5.9 10e12/L    Hemoglobin 14.4 13.3 - 17.7 g/dL    Hematocrit 43.1 40.0 - 53.0 %    MCV 88 78 - 100 fl    MCH 29.3 26.5 - 33.0 pg    MCHC 33.4 31.5 - 36.5 g/dL    RDW 12.5 10.0 - 15.0 %    Platelet Count 178 150 - 450 10e9/L    Diff Method Automated Method     % Neutrophils 72.7 %    % Lymphocytes 15.4 %    % Monocytes 8.9 %    % Eosinophils 2.0 %    % Basophils 0.3 %    % Immature Granulocytes 0.7 %    Nucleated RBCs 0 0 /100    Absolute Neutrophil 6.2 1.6 - 8.3 10e9/L    Absolute Lymphocytes 1.3 0.8 - 5.3 10e9/L    Absolute Monocytes 0.8 0.0 - 1.3 10e9/L    Absolute Basophils 0.0 0.0 - 0.2 10e9/L    Abs Immature Granulocytes 0.1 0 - 0.4 10e9/L    Absolute Nucleated RBC 0.0    Basic metabolic  panel   Result Value Ref Range    Sodium 130 (L) 133 - 144 mmol/L    Potassium 4.0 3.4 - 5.3 mmol/L    Chloride 98 94 - 109 mmol/L    Carbon Dioxide 28 20 - 32 mmol/L    Anion Gap 4 3 - 14 mmol/L    Glucose 105 (H) 70 - 99 mg/dL    Urea Nitrogen 8 7 - 30 mg/dL    Creatinine 1.05 0.66 - 1.25 mg/dL    GFR Estimate 64 >60 mL/min/[1.73_m2]    GFR Estimate If Black 75 >60 mL/min/[1.73_m2]    Calcium 8.0 (L) 8.5 - 10.1 mg/dL   INR   Result Value Ref Range    INR 1.02 0.86 - 1.14   XR Pelvis and Hip Left 2 Views    Narrative    EXAM: XR PELVIS AND HIP LEFT 2 VIEWS  LOCATION: Health system  DATE/TIME: 5/20/2020 6:08 PM    INDICATION: Trauma, pain.  COMPARISON: None.    FINDINGS: Lower lumbar spine degenerative change. Plate-pin fixation of the proximal right femur. There is right hip joint space narrowing which is prominent superomedially.      Impression    IMPRESSION: Left femoral neck fracture with displacement, impaction, and angulation.       Medications   HYDROmorphone (PF) (DILAUDID) injection 0.2 mg (0.2 mg Intravenous Given 5/20/20 1925)       Assessments & Plan (with Medical Decision Making)  85-year-old male with closed fracture of the left femoral surgical neck.  CMS intact.  Case was reviewed with the on-call orthopedist, Dr. Rose.  His plan was to likely operate tomorrow.  He is accepted for admission by Dr. Herrera.     I have reviewed the nursing notes.    I have reviewed the findings, diagnosis, plan and need for follow up with the patient.       New Prescriptions    No medications on file       Final diagnoses:   Closed fracture of neck of left femur, initial encounter (H)       5/20/2020   [facility] EMERGENCY DEPARTMENT     Franck Mccracken MD  05/20/20 1949

## 2020-05-20 NOTE — LETTER
Transition Communication Hand-off for Care Transitions to Next Level of Care Provider    Name: Jerry Rodriguez  : 1935  MRN #: [id number]  Primary Care Provider: Roberta Beaver MD  Primary Care MD Name: Dr. Beaver   Primary Clinic: [facility] [address]  Primary Care Clinic Name: [facility]   Reason for Hospitalization:  Closed fracture of neck of left femur, initial encounter (H) [S72.002A]  Admit Date/Time: 2020  4:50 PM  Discharge Date: 20   Payor Source: Payor: [facility] / Plan: BCBS MEDICARE ADVANTAGE / Product Type: Medicare /     Readmission Assessment Measure (DARREN) Risk Score/category: Average     Reason for Communication Hand-off Referral: Fragility  Other going to TCU     Discharge Plan:  Discharge Plan:      Most Recent Value   Disposition Comments  TCU is recommended           Concern for non-adherence with plan of care:   Y/N no   Discharge Needs Assessment:  Needs      Most Recent Value   Equipment Currently Used at Home  cane, straight, cane, quad, walker, rolling [2WW used in the morning to get out of bed]   # of Referrals Placed by [facility]  Internal Clinic Care Coordination, Post Acute Facilities   Skilled Nursing Facility  [facility] [phone], Fax: [phone]   PAS Number  [id number]          Already enrolled in Tele-monitoring program and name of program:  no   Follow-up specialty is recommended: Yes    Follow-up plan:  No future appointments. Should follow up with Dr. Rose.     Any outstanding tests or procedures:        Referrals     Future Labs/Procedures    Occupational Therapy Adult Consult     Comments:    Evaluate and treat as clinically indicated.    Reason:  Evaluate and treat as clinically indicated.    Reason:  S/p hip fracture with yeny arthroplasty on left leg.   Weight bear as tolerated. Posterior hip precautions (no hip flexion >90; no adduction beyond midline, no internal rotation of the foot beyond  neutral).   Work on ADL adjustments with new precautions. Work on building independence again.   Was independent prior to fall. Lives with spouse, and is her primary caregiver.    Physical Therapy Adult Consult     Comments:    Evaluate and treat as clinically indicated.    Reason:  S/p hip fracture with yeny arthroplasty on left leg.   Weight bear as tolerated. Posterior hip precautions (no hip flexion >90; no adduction beyond midline, no internal rotation of the foot beyond neutral).   Was independent prior to fall. Lives with spouse, and is her primary caregiver.          Supplies     Future Labs/Procedures    AntiEmbolism Stockings     Comments:    Bilateral below knee length.On in the morning, off at night    Pneumatic Compression Device      Comments:    Bilateral calf. Remove 30 mins BID.          Key Recommendations:  TCU. Ortho follow up.     GARCÍA Lipscomb  [facility]   [phone]     AVS/Discharge Summary is the source of truth; this is a helpful guide for improved communication of patient story

## 2020-05-21 ENCOUNTER — ANESTHESIA EVENT (OUTPATIENT)
Dept: SURGERY | Facility: CLINIC | Age: 85
DRG: 470 | End: 2020-05-21
Payer: COMMERCIAL

## 2020-05-21 ENCOUNTER — ANESTHESIA (OUTPATIENT)
Dept: SURGERY | Facility: CLINIC | Age: 85
DRG: 470 | End: 2020-05-21
Payer: COMMERCIAL

## 2020-05-21 ENCOUNTER — APPOINTMENT (OUTPATIENT)
Dept: GENERAL RADIOLOGY | Facility: CLINIC | Age: 85
DRG: 470 | End: 2020-05-21
Attending: ORTHOPAEDIC SURGERY
Payer: COMMERCIAL

## 2020-05-21 PROBLEM — S72.009A FEMORAL NECK FRACTURE (H): Status: ACTIVE | Noted: 2020-05-21

## 2020-05-21 LAB
ANION GAP SERPL CALCULATED.3IONS-SCNC: 5 MMOL/L (ref 3–14)
BUN SERPL-MCNC: 9 MG/DL (ref 7–30)
CALCIUM SERPL-MCNC: 8.3 MG/DL (ref 8.5–10.1)
CHLORIDE SERPL-SCNC: 97 MMOL/L (ref 94–109)
CO2 SERPL-SCNC: 28 MMOL/L (ref 20–32)
CREAT SERPL-MCNC: 0.89 MG/DL (ref 0.66–1.25)
GFR SERPL CREATININE-BSD FRML MDRD: 78 ML/MIN/{1.73_M2}
GLUCOSE SERPL-MCNC: 120 MG/DL (ref 70–99)
HGB BLD-MCNC: 15.5 G/DL (ref 13.3–17.7)
POTASSIUM SERPL-SCNC: 3.8 MMOL/L (ref 3.4–5.3)
SARS-COV-2 PCR COMMENT: NORMAL
SARS-COV-2 RNA SPEC QL NAA+PROBE: NEGATIVE
SARS-COV-2 RNA SPEC QL NAA+PROBE: NORMAL
SODIUM SERPL-SCNC: 130 MMOL/L (ref 133–144)
SPECIMEN SOURCE: NORMAL
SPECIMEN SOURCE: NORMAL

## 2020-05-21 PROCEDURE — C1776 JOINT DEVICE (IMPLANTABLE): HCPCS | Performed by: ORTHOPAEDIC SURGERY

## 2020-05-21 PROCEDURE — 25000128 H RX IP 250 OP 636: Performed by: ORTHOPAEDIC SURGERY

## 2020-05-21 PROCEDURE — 25000128 H RX IP 250 OP 636: Performed by: NURSE ANESTHETIST, CERTIFIED REGISTERED

## 2020-05-21 PROCEDURE — 85018 HEMOGLOBIN: CPT | Performed by: FAMILY MEDICINE

## 2020-05-21 PROCEDURE — 27210794 ZZH OR GENERAL SUPPLY STERILE: Performed by: ORTHOPAEDIC SURGERY

## 2020-05-21 PROCEDURE — 37000009 ZZH ANESTHESIA TECHNICAL FEE, EACH ADDTL 15 MIN: Performed by: ORTHOPAEDIC SURGERY

## 2020-05-21 PROCEDURE — 25000566 ZZH SEVOFLURANE, EA 15 MIN: Performed by: ORTHOPAEDIC SURGERY

## 2020-05-21 PROCEDURE — 36415 COLL VENOUS BLD VENIPUNCTURE: CPT | Performed by: FAMILY MEDICINE

## 2020-05-21 PROCEDURE — 25800030 ZZH RX IP 258 OP 636: Performed by: FAMILY MEDICINE

## 2020-05-21 PROCEDURE — 37000008 ZZH ANESTHESIA TECHNICAL FEE, 1ST 30 MIN: Performed by: ORTHOPAEDIC SURGERY

## 2020-05-21 PROCEDURE — 12000000 ZZH R&B MED SURG/OB

## 2020-05-21 PROCEDURE — 25000128 H RX IP 250 OP 636: Performed by: FAMILY MEDICINE

## 2020-05-21 PROCEDURE — 25000132 ZZH RX MED GY IP 250 OP 250 PS 637: Performed by: ORTHOPAEDIC SURGERY

## 2020-05-21 PROCEDURE — 40000985 XR PELVIS AND HIP PORTABLE LEFT 1 VIEW

## 2020-05-21 PROCEDURE — 36000093 ZZH SURGERY LEVEL 4 1ST 30 MIN: Performed by: ORTHOPAEDIC SURGERY

## 2020-05-21 PROCEDURE — 25000125 ZZHC RX 250: Performed by: NURSE ANESTHETIST, CERTIFIED REGISTERED

## 2020-05-21 PROCEDURE — 99222 1ST HOSP IP/OBS MODERATE 55: CPT | Mod: 57 | Performed by: ORTHOPAEDIC SURGERY

## 2020-05-21 PROCEDURE — 80048 BASIC METABOLIC PNL TOTAL CA: CPT | Performed by: FAMILY MEDICINE

## 2020-05-21 PROCEDURE — 25800030 ZZH RX IP 258 OP 636: Performed by: ORTHOPAEDIC SURGERY

## 2020-05-21 PROCEDURE — 0SRS01A REPLACEMENT OF LEFT HIP JOINT, FEMORAL SURFACE WITH METAL SYNTHETIC SUBSTITUTE, UNCEMENTED, OPEN APPROACH: ICD-10-PCS | Performed by: ORTHOPAEDIC SURGERY

## 2020-05-21 PROCEDURE — 71000015 ZZH RECOVERY PHASE 1 LEVEL 2 EA ADDTL HR: Performed by: ORTHOPAEDIC SURGERY

## 2020-05-21 PROCEDURE — 27236 TREAT THIGH FRACTURE: CPT | Mod: LT | Performed by: ORTHOPAEDIC SURGERY

## 2020-05-21 PROCEDURE — 71000014 ZZH RECOVERY PHASE 1 LEVEL 2 FIRST HR: Performed by: ORTHOPAEDIC SURGERY

## 2020-05-21 PROCEDURE — 25800030 ZZH RX IP 258 OP 636: Performed by: NURSE ANESTHETIST, CERTIFIED REGISTERED

## 2020-05-21 PROCEDURE — 36000063 ZZH SURGERY LEVEL 4 EA 15 ADDTL MIN: Performed by: ORTHOPAEDIC SURGERY

## 2020-05-21 DEVICE — IMP HEAD FEMORAL STRK V40 VITALIUM COCR 28MM +0MM 6260-5-128: Type: IMPLANTABLE DEVICE | Site: HIP | Status: FUNCTIONAL

## 2020-05-21 DEVICE — IMP HEAD STRK FEMORAL UHR BIPOLAR 28X52MM UH1-52-28: Type: IMPLANTABLE DEVICE | Site: HIP | Status: FUNCTIONAL

## 2020-05-21 DEVICE — IMPLANTABLE DEVICE: Type: IMPLANTABLE DEVICE | Site: HIP | Status: FUNCTIONAL

## 2020-05-21 RX ORDER — SODIUM CHLORIDE, SODIUM LACTATE, POTASSIUM CHLORIDE, CALCIUM CHLORIDE 600; 310; 30; 20 MG/100ML; MG/100ML; MG/100ML; MG/100ML
INJECTION, SOLUTION INTRAVENOUS CONTINUOUS
Status: DISCONTINUED | OUTPATIENT
Start: 2020-05-21 | End: 2020-05-21 | Stop reason: HOSPADM

## 2020-05-21 RX ORDER — FENTANYL CITRATE 50 UG/ML
INJECTION, SOLUTION INTRAMUSCULAR; INTRAVENOUS PRN
Status: DISCONTINUED | OUTPATIENT
Start: 2020-05-21 | End: 2020-05-21

## 2020-05-21 RX ORDER — HYDROMORPHONE HYDROCHLORIDE 1 MG/ML
.3-.5 INJECTION, SOLUTION INTRAMUSCULAR; INTRAVENOUS; SUBCUTANEOUS EVERY 5 MIN PRN
Status: DISCONTINUED | OUTPATIENT
Start: 2020-05-21 | End: 2020-05-21 | Stop reason: HOSPADM

## 2020-05-21 RX ORDER — LIDOCAINE 40 MG/G
CREAM TOPICAL
Status: DISCONTINUED | OUTPATIENT
Start: 2020-05-21 | End: 2020-05-23 | Stop reason: HOSPADM

## 2020-05-21 RX ORDER — ACETAMINOPHEN 325 MG/1
975 TABLET ORAL EVERY 8 HOURS
Status: DISCONTINUED | OUTPATIENT
Start: 2020-05-21 | End: 2020-05-23 | Stop reason: HOSPADM

## 2020-05-21 RX ORDER — AMOXICILLIN 250 MG
2 CAPSULE ORAL 2 TIMES DAILY
Status: DISCONTINUED | OUTPATIENT
Start: 2020-05-21 | End: 2020-05-23 | Stop reason: HOSPADM

## 2020-05-21 RX ORDER — METOPROLOL TARTRATE 1 MG/ML
1-2 INJECTION, SOLUTION INTRAVENOUS EVERY 5 MIN PRN
Status: DISCONTINUED | OUTPATIENT
Start: 2020-05-21 | End: 2020-05-21 | Stop reason: HOSPADM

## 2020-05-21 RX ORDER — DEXAMETHASONE SODIUM PHOSPHATE 4 MG/ML
4 INJECTION, SOLUTION INTRA-ARTICULAR; INTRALESIONAL; INTRAMUSCULAR; INTRAVENOUS; SOFT TISSUE
Status: DISCONTINUED | OUTPATIENT
Start: 2020-05-21 | End: 2020-05-21 | Stop reason: HOSPADM

## 2020-05-21 RX ORDER — HYDRALAZINE HYDROCHLORIDE 20 MG/ML
2.5-5 INJECTION INTRAMUSCULAR; INTRAVENOUS EVERY 10 MIN PRN
Status: DISCONTINUED | OUTPATIENT
Start: 2020-05-21 | End: 2020-05-21 | Stop reason: HOSPADM

## 2020-05-21 RX ORDER — HYDROMORPHONE HYDROCHLORIDE 1 MG/ML
0.2 INJECTION, SOLUTION INTRAMUSCULAR; INTRAVENOUS; SUBCUTANEOUS
Status: DISCONTINUED | OUTPATIENT
Start: 2020-05-21 | End: 2020-05-23 | Stop reason: HOSPADM

## 2020-05-21 RX ORDER — CEFAZOLIN SODIUM 2 G/100ML
2 INJECTION, SOLUTION INTRAVENOUS EVERY 8 HOURS
Status: COMPLETED | OUTPATIENT
Start: 2020-05-21 | End: 2020-05-22

## 2020-05-21 RX ORDER — ACETAMINOPHEN 325 MG/1
650 TABLET ORAL EVERY 4 HOURS PRN
Status: DISCONTINUED | OUTPATIENT
Start: 2020-05-24 | End: 2020-05-23 | Stop reason: HOSPADM

## 2020-05-21 RX ORDER — OXYCODONE HYDROCHLORIDE 5 MG/1
5-10 TABLET ORAL
Status: DISCONTINUED | OUTPATIENT
Start: 2020-05-21 | End: 2020-05-23 | Stop reason: HOSPADM

## 2020-05-21 RX ORDER — ONDANSETRON 4 MG/1
4 TABLET, ORALLY DISINTEGRATING ORAL EVERY 30 MIN PRN
Status: DISCONTINUED | OUTPATIENT
Start: 2020-05-21 | End: 2020-05-21 | Stop reason: HOSPADM

## 2020-05-21 RX ORDER — NALOXONE HYDROCHLORIDE 0.4 MG/ML
.1-.4 INJECTION, SOLUTION INTRAMUSCULAR; INTRAVENOUS; SUBCUTANEOUS
Status: DISCONTINUED | OUTPATIENT
Start: 2020-05-21 | End: 2020-05-21

## 2020-05-21 RX ORDER — FENTANYL CITRATE 50 UG/ML
25-50 INJECTION, SOLUTION INTRAMUSCULAR; INTRAVENOUS
Status: DISCONTINUED | OUTPATIENT
Start: 2020-05-21 | End: 2020-05-21 | Stop reason: HOSPADM

## 2020-05-21 RX ORDER — ONDANSETRON 4 MG/1
4 TABLET, ORALLY DISINTEGRATING ORAL EVERY 6 HOURS PRN
Status: DISCONTINUED | OUTPATIENT
Start: 2020-05-21 | End: 2020-05-23 | Stop reason: HOSPADM

## 2020-05-21 RX ORDER — POLYETHYLENE GLYCOL 3350 17 G/17G
17 POWDER, FOR SOLUTION ORAL DAILY
Status: DISCONTINUED | OUTPATIENT
Start: 2020-05-21 | End: 2020-05-23 | Stop reason: HOSPADM

## 2020-05-21 RX ORDER — ONDANSETRON 2 MG/ML
4 INJECTION INTRAMUSCULAR; INTRAVENOUS EVERY 6 HOURS PRN
Status: DISCONTINUED | OUTPATIENT
Start: 2020-05-21 | End: 2020-05-23 | Stop reason: HOSPADM

## 2020-05-21 RX ORDER — BISACODYL 10 MG
10 SUPPOSITORY, RECTAL RECTAL DAILY PRN
Status: DISCONTINUED | OUTPATIENT
Start: 2020-05-21 | End: 2020-05-23 | Stop reason: HOSPADM

## 2020-05-21 RX ORDER — ONDANSETRON 2 MG/ML
4 INJECTION INTRAMUSCULAR; INTRAVENOUS EVERY 30 MIN PRN
Status: DISCONTINUED | OUTPATIENT
Start: 2020-05-21 | End: 2020-05-21 | Stop reason: HOSPADM

## 2020-05-21 RX ORDER — NALOXONE HYDROCHLORIDE 0.4 MG/ML
.1-.4 INJECTION, SOLUTION INTRAMUSCULAR; INTRAVENOUS; SUBCUTANEOUS
Status: DISCONTINUED | OUTPATIENT
Start: 2020-05-21 | End: 2020-05-23 | Stop reason: HOSPADM

## 2020-05-21 RX ORDER — SODIUM CHLORIDE, SODIUM LACTATE, POTASSIUM CHLORIDE, CALCIUM CHLORIDE 600; 310; 30; 20 MG/100ML; MG/100ML; MG/100ML; MG/100ML
INJECTION, SOLUTION INTRAVENOUS CONTINUOUS PRN
Status: DISCONTINUED | OUTPATIENT
Start: 2020-05-21 | End: 2020-05-21

## 2020-05-21 RX ORDER — PROPOFOL 10 MG/ML
INJECTION, EMULSION INTRAVENOUS PRN
Status: DISCONTINUED | OUTPATIENT
Start: 2020-05-21 | End: 2020-05-21

## 2020-05-21 RX ORDER — PHENYLEPHRINE HYDROCHLORIDE 10 MG/ML
INJECTION INTRAVENOUS PRN
Status: DISCONTINUED | OUTPATIENT
Start: 2020-05-21 | End: 2020-05-21

## 2020-05-21 RX ADMIN — ROCURONIUM BROMIDE 50 MG: 10 INJECTION INTRAVENOUS at 14:50

## 2020-05-21 RX ADMIN — FENTANYL CITRATE 50 MCG: 50 INJECTION, SOLUTION INTRAMUSCULAR; INTRAVENOUS at 15:17

## 2020-05-21 RX ADMIN — NICOTINE 1 PATCH: 21 PATCH TRANSDERMAL at 20:10

## 2020-05-21 RX ADMIN — BUSPIRONE HYDROCHLORIDE 5 MG: 5 TABLET ORAL at 20:05

## 2020-05-21 RX ADMIN — HYDRALAZINE HYDROCHLORIDE 2.5 MG: 20 INJECTION INTRAMUSCULAR; INTRAVENOUS at 17:52

## 2020-05-21 RX ADMIN — FENTANYL CITRATE 25 MCG: 50 INJECTION INTRAMUSCULAR; INTRAVENOUS at 17:34

## 2020-05-21 RX ADMIN — SODIUM CHLORIDE, POTASSIUM CHLORIDE, SODIUM LACTATE AND CALCIUM CHLORIDE: 600; 310; 30; 20 INJECTION, SOLUTION INTRAVENOUS at 14:45

## 2020-05-21 RX ADMIN — FENTANYL CITRATE 25 MCG: 50 INJECTION, SOLUTION INTRAMUSCULAR; INTRAVENOUS at 14:47

## 2020-05-21 RX ADMIN — HYDRALAZINE HYDROCHLORIDE 2.5 MG: 20 INJECTION INTRAMUSCULAR; INTRAVENOUS at 17:39

## 2020-05-21 RX ADMIN — HYDROMORPHONE HYDROCHLORIDE 0.2 MG: 1 INJECTION, SOLUTION INTRAMUSCULAR; INTRAVENOUS; SUBCUTANEOUS at 12:24

## 2020-05-21 RX ADMIN — HYDROMORPHONE HYDROCHLORIDE 0.2 MG: 1 INJECTION, SOLUTION INTRAMUSCULAR; INTRAVENOUS; SUBCUTANEOUS at 03:20

## 2020-05-21 RX ADMIN — TIMOLOL MALEATE 1 DROP: 5 SOLUTION/ DROPS OPHTHALMIC at 08:07

## 2020-05-21 RX ADMIN — CEFAZOLIN SODIUM 2 G: 2 INJECTION, SOLUTION INTRAVENOUS at 15:00

## 2020-05-21 RX ADMIN — PHENYLEPHRINE HYDROCHLORIDE 100 MCG: 10 INJECTION INTRAVENOUS at 15:16

## 2020-05-21 RX ADMIN — HYDROMORPHONE HYDROCHLORIDE 0.2 MG: 1 INJECTION, SOLUTION INTRAMUSCULAR; INTRAVENOUS; SUBCUTANEOUS at 10:32

## 2020-05-21 RX ADMIN — FENTANYL CITRATE 25 MCG: 50 INJECTION, SOLUTION INTRAMUSCULAR; INTRAVENOUS at 14:50

## 2020-05-21 RX ADMIN — SODIUM CHLORIDE, POTASSIUM CHLORIDE, SODIUM LACTATE AND CALCIUM CHLORIDE: 600; 310; 30; 20 INJECTION, SOLUTION INTRAVENOUS at 16:22

## 2020-05-21 RX ADMIN — ACETAMINOPHEN 975 MG: 325 TABLET, FILM COATED ORAL at 20:05

## 2020-05-21 RX ADMIN — ONDANSETRON 4 MG: 2 INJECTION INTRAMUSCULAR; INTRAVENOUS at 15:15

## 2020-05-21 RX ADMIN — CEFAZOLIN SODIUM 2 G: 2 INJECTION, SOLUTION INTRAVENOUS at 22:51

## 2020-05-21 RX ADMIN — HYDRALAZINE HYDROCHLORIDE 2.5 MG: 20 INJECTION INTRAMUSCULAR; INTRAVENOUS at 18:25

## 2020-05-21 RX ADMIN — PROPOFOL 120 MG: 10 INJECTION, EMULSION INTRAVENOUS at 14:50

## 2020-05-21 RX ADMIN — SODIUM CHLORIDE: 9 INJECTION, SOLUTION INTRAVENOUS at 18:58

## 2020-05-21 RX ADMIN — SENNOSIDES AND DOCUSATE SODIUM 2 TABLET: 8.6; 5 TABLET ORAL at 20:05

## 2020-05-21 RX ADMIN — SODIUM CHLORIDE: 9 INJECTION, SOLUTION INTRAVENOUS at 08:07

## 2020-05-21 RX ADMIN — TIMOLOL MALEATE 1 DROP: 5 SOLUTION/ DROPS OPHTHALMIC at 20:14

## 2020-05-21 RX ADMIN — FENTANYL CITRATE 25 MCG: 50 INJECTION INTRAMUSCULAR; INTRAVENOUS at 17:51

## 2020-05-21 RX ADMIN — HYDROMORPHONE HYDROCHLORIDE 0.2 MG: 1 INJECTION, SOLUTION INTRAMUSCULAR; INTRAVENOUS; SUBCUTANEOUS at 08:07

## 2020-05-21 RX ADMIN — PHENYLEPHRINE HYDROCHLORIDE 100 MCG: 10 INJECTION INTRAVENOUS at 16:13

## 2020-05-21 RX ADMIN — SUGAMMADEX 200 MG: 100 INJECTION, SOLUTION INTRAVENOUS at 16:46

## 2020-05-21 SDOH — HEALTH STABILITY: MENTAL HEALTH: CURRENT SMOKER: 1

## 2020-05-21 ASSESSMENT — ACTIVITIES OF DAILY LIVING (ADL)
NUMBER_OF_TIMES_PATIENT_HAS_FALLEN_WITHIN_LAST_SIX_MONTHS: 1
ADLS_ACUITY_SCORE: 16
FALL_HISTORY_WITHIN_LAST_SIX_MONTHS: YES
TOILETING: 0-->INDEPENDENT
TRANSFERRING: 0-->INDEPENDENT
RETIRED_COMMUNICATION: 0-->UNDERSTANDS/COMMUNICATES WITHOUT DIFFICULTY
RETIRED_EATING: 0-->INDEPENDENT
COGNITION: 0 - NO COGNITION ISSUES REPORTED
BATHING: 0-->INDEPENDENT
ADLS_ACUITY_SCORE: 18
AMBULATION: 0-->INDEPENDENT
DRESS: 0-->INDEPENDENT
SWALLOWING: 0-->SWALLOWS FOODS/LIQUIDS WITHOUT DIFFICULTY
ADLS_ACUITY_SCORE: 16
ADLS_ACUITY_SCORE: 18
ADLS_ACUITY_SCORE: 16
ADLS_ACUITY_SCORE: 18

## 2020-05-21 ASSESSMENT — COPD QUESTIONNAIRES
CAT_SEVERITY: MILD
COPD: 1

## 2020-05-21 ASSESSMENT — LIFESTYLE VARIABLES: TOBACCO_USE: 1

## 2020-05-21 NOTE — PROGRESS NOTES
A&Ox4. VSS on room air except elevated BP. Left leg pain managed with IV dilaudid. Abrasion left elbow covered by bandaid. No bruising noted from fall. Edema BLE 2+. LS clear. BS active, passing gas. NPO since midnight. Voiding with urgency and frequency, condom catheter placed, output adequate. PCD on BLE. PIV left hand with NS infusing at 100 mL/hr. Pre-op surgical scrub completed with chlohexidine gluconate, full linen change, new gown, jewelery and dentures removed.

## 2020-05-21 NOTE — H&P
[facility]    History and Physical - Hospitalist Service       Date of Admission:  5/20/2020    Assessment & Plan   Jerry Rodriguez is a 85 year old male admitted on 5/20/2020. He he fell today at home, with his garage on uneven ground.  Tripped and landed on his left side with immediate pain.  In the emergency department x-ray showing femoral neck fracture with angulation.  Orthopedics has been consulted they are planning surgery tomorrow.  Patient will be admitted inpatient status, kept n.p.o. after midnight fluids, pain control.  To be in hospital for at least 2 to 3 days and will likely need rehab placement before going home.   Closed fracture of left hip (H)  Result of fall today at home  States lost balance, tripped and uneven floor with history of shortened leg compared to the right previous injury on his right hip.  X-ray with left femoral neck fracture  ER reviewed with on-call orthopedist.  Plan is to bring him to surgery tomorrow  Hospital, n.p.o. after midnight, pain control  Patient is medically cleared for surgery and anesthesia    Hyponatremia  Sodium at 130 which is chronic for him  IV saline overnight, recheck in a.m.    Tobacco use disorder  Smoking about 1 pack cigarettes per day  Nicotine replacement ordered         Diet: Regular, n.p.o. post midnight per anesthesia recommendations  DVT Prophylaxis: Pneumatic Compression Devices  Streeter Catheter: not present  Code Status: Full code         Disposition Plan   Expected discharge: 2 - 3 days, recommended to transitional care unit once adequate pain management/ tolerating PO medications, hemoglobin stable and Successfull hip surgery.  Entered: Ned Herrera MD 05/20/2020, 8:01 PM     The patient's care was discussed with the Patient.    Ned Herrera MD  [facility]    ______________________________________________________________________    Chief Complaint   85-year-old  male with left hip pain after a fall    History is obtained from the patient, electronic health record and emergency department physician    History of Present Illness   Jerry Rodriguez is a 85 year old male who presents emergency department after a fall.  He was out in a garage/machine she had, on uneven ground and lost his balance and fell onto his left side.  States he has problems walking because his left leg is shorter than his right leg after previous injury and fracture of his right leg years ago.  Denies hitting his head or other injuries, did scrape his elbow and was unable to get up after the fall.  Denies feeling dizzy, short of breath, chest pain prior to the fall.  He is otherwise in good health previous heart problems.  He does smoke at least 1 pack cigarettes a day and has been told that he is a little bit of mild COPD.  Denies any increased shortness of breath or cough.    Review of Systems    The 10 point Review of Systems is negative other than noted in the HPI or here.     Past Medical History    I have reviewed this patient's medical history and updated it with pertinent information if needed.   Past Medical History:   Diagnosis Date     Anxiety state, unspecified      Arthritis      Hypertension      Hyponatremia      Inguinal hernia with obstruction, without mention of gangrene, unilateral or unspecified, (not specified as recurrent)     Strangulated right inguinal hernia years ago     Inguinal hernia without mention of obstruction or gangrene, unilateral or unspecified, (not specified as recurrent) 11/07/2000    Left inguinal hernia, sliding and not incarcerated     Other joint derangement, not elsewhere classified, forearm 1980    traumatic fracture     Pathologic fracture of neck of femur (H)     Surgery for hip fracture     Stricture and stenosis of esophagus 2004       Past Surgical History   I have reviewed this patient's surgical history and updated it with pertinent information if  needed.  Past Surgical History:   Procedure Laterality Date     C OSTEOTOMY HIP/FEMUR  1980    traumatic fracture right hip     COLONOSCOPY  10/17/2007    Repeat Colonoscopy in 5 years for surveillance.     COLONOSCOPY N/A 1/24/2018    Procedure: COLONOSCOPY;;  Surgeon: Don Arambula MD;  Location: [facility]     ESOPHAGOSCOPY, GASTROSCOPY, DUODENOSCOPY (EGD), COMBINED  1/4/2011    COMBINED ESOPHAGOSCOPY, GASTROSCOPY, DUODENOSCOPY (EGD), ESOPHAGEAL DILATION BALLOON LESS THAN 30MM performed by ELEN BUSBY at  GI     ESOPHAGOSCOPY, GASTROSCOPY, DUODENOSCOPY (EGD), COMBINED N/A 9/25/2015    Procedure: COMBINED ESOPHAGOSCOPY, GASTROSCOPY, DUODENOSCOPY (EGD), BIOPSY SINGLE OR MULTIPLE;  Surgeon: Markell Lawson MD;  Location: [facility]     ESOPHAGOSCOPY, GASTROSCOPY, DUODENOSCOPY (EGD), COMBINED N/A 1/24/2018    Procedure: COMBINED ESOPHAGOSCOPY, GASTROSCOPY, DUODENOSCOPY (EGD), BIOPSY SINGLE OR MULTIPLE;;  Surgeon: Don Arambula MD;  Location: [facility]     ESOPHAGOSCOPY, GASTROSCOPY, DUODENOSCOPY (EGD), DILATATION, COMBINED N/A 1/24/2018    Procedure: COMBINED ESOPHAGOSCOPY, GASTROSCOPY, DUODENOSCOPY (EGD), DILATATION;  Esophagogastroduodenoscopy with Dilatation and Biopsy by Biopsy, Colonoscopy;  Surgeon: Don Arambual MD;  Location: [facility]     HC COLONOSCOPY THRU STOMA, DIAGNOSTIC  11/15/2000    Recurrent abdominal pain     HC COLONOSCOPY W/WO BRUSH/WASH  07/19/2004     HC REPAIR INCISIONAL HERNIA,REDUCIBLE  11/10/2000    Hernia Repair, Incisional, Unilateral, Left inguinal hernia and umbilical hernia     HC UGI ENDOSCOPY DIAG W OR W/O BRUSH/WASH  07/19/2004     HC UGI ENDOSCOPY DIAG W OR W/O BRUSH/WASH  05/24/2005    Peptic stricture of esophagus, dilated to 18 mm. Erosive reflux esophagitis. Hiatal hernia. Erosive duodenitis.     HC UGI ENDOSCOPY, SIMPLE EXAM  02/20/07     HC UGI ENDOSCOPY, SIMPLE EXAM  12/02/08    Schiatzky's ring, dialated, PPI indefinitely     HC UGI ENDOSCOPY, SIMPLE EXAM   12/15/2009    benign stricture with dialation     LAPAROSCOPIC APPENDECTOMY  09/15/2005       Social History   I have reviewed this patient's social history and updated it with pertinent information if needed.  Social History     Tobacco Use     Smoking status: Current Every Day Smoker     Packs/day: 1.00     Years: 52.00     Pack years: 52.00     Types: Cigarettes     Smokeless tobacco: Current User     Types: Chew     Tobacco comment: occasionally/ 1.5tin qwk   Substance Use Topics     Alcohol use: No     Comment: quit      Drug use: No       Family History   I have reviewed this patient's family history and updated it with pertinent information if needed.   Family History   Problem Relation Age of Onset     Cancer Sister         brain tumor     Diabetes Sister      Alzheimer Disease Sister        Prior to Admission Medications   Prior to Admission Medications   Prescriptions Last Dose Informant Patient Reported? Taking?   Spacer/Aero-Holding Chambers (VALVED HOLDING CHAMBER) JULISSA   No No   Si Device 2 times daily To be used with flovent   VENTOLIN  (90 Base) MCG/ACT inhaler 2020 at 2100  No Yes   Sig: INHALE TWO PUFFS BY MOUTH EVERY SIX HOURS AS NEEDED FOR SHORTNESS OF BREATH OR WHEEZING. LAST REFILL UNTIL SEEN    busPIRone (BUSPAR) 5 MG tablet 2020 at 0800  No Yes   Sig: TAKE ONE TABLET BY MOUTH TWICE DAILY    fluticasone (FLONASE) 50 MCG/ACT spray 2020 at 0800  No Yes   Sig: Spray 1-2 sprays into both nostrils daily   multivitamin, therapeutic with minerals (MULTI-VITAMIN) TABS 2020 at 0800  Yes Yes   Sig: Take 1 tablet by mouth daily   timolol (TIMOPTIC) 0.5 % ophthalmic solution 2020 at 0800  Yes Yes   Sig: Apply 1 drop to eye 2 times daily      Facility-Administered Medications: None     Allergies   Allergies   Allergen Reactions     No Known Drug Allergies        Physical Exam   Vital Signs: Temp: 98.1  F (36.7  C) Temp src: Oral BP: (!) 188/92 Pulse: 72 Heart Rate:  72 Resp: 18 SpO2: 95 %      Weight: 188 lbs 0 oz    Constitutional: awake, alert, cooperative, no apparent distress, and appears stated age  Eyes: Lids and lashes normal, pupils equal, round and reactive to light, extra ocular muscles intact, sclera clear, conjunctiva normal  ENT: Normocephalic, without obvious abnormality, atraumatic, sinuses nontender on palpation, external ears without lesions, oral pharynx with moist mucous membranes, tonsils without erythema or exudates, gums normal and good dentition.  Hematologic / Lymphatic: no cervical lymphadenopathy and no supraclavicular lymphadenopathy  Respiratory: No increased work of breathing, good air exchange, clear to auscultation bilaterally, no crackles or wheezing  Cardiovascular: regular rate and rhythm, no murmur noted and no edema  GI: normal bowel sounds, soft, non-distended and non-tender  Musculoskeletal: Left leg is shortened and externally rotated  Neurologic: Awake, alert, oriented to name, place and time.  Cranial nerves II-XII are grossly intact.     Data   Data reviewed today: I reviewed all medications, new labs and imaging results over the last 24 hours. I personally reviewed the EKG tracing showing Regular sinus rhythm with no acute changes.    Results for orders placed or performed during the hospital encounter of 05/20/20 (from the past 24 hour(s))   CBC with platelets differential   Result Value Ref Range    WBC 8.6 4.0 - 11.0 10e9/L    RBC Count 4.91 4.4 - 5.9 10e12/L    Hemoglobin 14.4 13.3 - 17.7 g/dL    Hematocrit 43.1 40.0 - 53.0 %    MCV 88 78 - 100 fl    MCH 29.3 26.5 - 33.0 pg    MCHC 33.4 31.5 - 36.5 g/dL    RDW 12.5 10.0 - 15.0 %    Platelet Count 178 150 - 450 10e9/L    Diff Method Automated Method     % Neutrophils 72.7 %    % Lymphocytes 15.4 %    % Monocytes 8.9 %    % Eosinophils 2.0 %    % Basophils 0.3 %    % Immature Granulocytes 0.7 %    Nucleated RBCs 0 0 /100    Absolute Neutrophil 6.2 1.6 - 8.3 10e9/L    Absolute  Lymphocytes 1.3 0.8 - 5.3 10e9/L    Absolute Monocytes 0.8 0.0 - 1.3 10e9/L    Absolute Basophils 0.0 0.0 - 0.2 10e9/L    Abs Immature Granulocytes 0.1 0 - 0.4 10e9/L    Absolute Nucleated RBC 0.0    Basic metabolic panel   Result Value Ref Range    Sodium 130 (L) 133 - 144 mmol/L    Potassium 4.0 3.4 - 5.3 mmol/L    Chloride 98 94 - 109 mmol/L    Carbon Dioxide 28 20 - 32 mmol/L    Anion Gap 4 3 - 14 mmol/L    Glucose 105 (H) 70 - 99 mg/dL    Urea Nitrogen 8 7 - 30 mg/dL    Creatinine 1.05 0.66 - 1.25 mg/dL    GFR Estimate 64 >60 mL/min/[1.73_m2]    GFR Estimate If Black 75 >60 mL/min/[1.73_m2]    Calcium 8.0 (L) 8.5 - 10.1 mg/dL   INR   Result Value Ref Range    INR 1.02 0.86 - 1.14   XR Pelvis and Hip Left 2 Views    Narrative    EXAM: XR PELVIS AND HIP LEFT 2 VIEWS  LOCATION: [facility]  DATE/TIME: 5/20/2020 6:08 PM    INDICATION: Trauma, pain.  COMPARISON: None.    FINDINGS: Lower lumbar spine degenerative change. Plate-pin fixation of the proximal right femur. There is right hip joint space narrowing which is prominent superomedially.      Impression    IMPRESSION: Left femoral neck fracture with displacement, impaction, and angulation.

## 2020-05-21 NOTE — ED NOTES
Bed: ED10  Expected date:   Expected time:   Means of arrival:   Comments:  Room 5 moving here after xray

## 2020-05-21 NOTE — PLAN OF CARE
VSS, except HTN (MS aware, patient going to surgery this afternoon). Patient c/o pain to back and left leg and hip, getting prn 0.2mg Dilaudid q2 which is effective. Alert and Orientated with some intermittent confusion. Lungs are diminished. Bowels are active. Patient denies any nausea. NPO. +2 edema to left hip, abrasion to left elbow. Patient denies numbness or tingling to extremities. Bedrest. Patient went to surgery at 1430.

## 2020-05-21 NOTE — ASSESSMENT & PLAN NOTE
Result of fall today at home  States lost balance, tripped and uneven floor with history of shortened leg compared to the right previous injury on his right hip.  X-ray with left femoral neck fracture  ER reviewed with on-call orthopedist.  Plan is to bring him to surgery tomorrow  [facility], n.p.o. after midnight, pain control  Patient is medically cleared for surgery and anesthesia

## 2020-05-21 NOTE — ANESTHESIA PREPROCEDURE EVALUATION
Anesthesia Pre-Procedure Evaluation    Patient: Jerry Rodriguez   MRN: [id number] : 1935          Preoperative Diagnosis: Hip fracture (H) [S72.009A]    Procedure(s):  HEMIARTHROPLASTY, HIP, BIPOLAR    Past Medical History:   Diagnosis Date     Anxiety state, unspecified      Arthritis      Hypertension      Hyponatremia      Inguinal hernia with obstruction, without mention of gangrene, unilateral or unspecified, (not specified as recurrent)     Strangulated right inguinal hernia years ago     Inguinal hernia without mention of obstruction or gangrene, unilateral or unspecified, (not specified as recurrent) 2000    Left inguinal hernia, sliding and not incarcerated     Other joint derangement, not elsewhere classified, forearm     traumatic fracture     Pathologic fracture of neck of femur (H)     Surgery for hip fracture     Stricture and stenosis of esophagus      Past Surgical History:   Procedure Laterality Date     C OSTEOTOMY HIP/FEMUR      traumatic fracture right hip     COLONOSCOPY  10/17/2007    Repeat Colonoscopy in 5 years for surveillance.     COLONOSCOPY N/A 2018    Procedure: COLONOSCOPY;;  Surgeon: Don Arambula MD;  Location: [facility]     ESOPHAGOSCOPY, GASTROSCOPY, DUODENOSCOPY (EGD), COMBINED  2011    COMBINED ESOPHAGOSCOPY, GASTROSCOPY, DUODENOSCOPY (EGD), ESOPHAGEAL DILATION BALLOON LESS THAN 30MM performed by ELEN BUSBY at  GI     ESOPHAGOSCOPY, GASTROSCOPY, DUODENOSCOPY (EGD), COMBINED N/A 2015    Procedure: COMBINED ESOPHAGOSCOPY, GASTROSCOPY, DUODENOSCOPY (EGD), BIOPSY SINGLE OR MULTIPLE;  Surgeon: Markell Lawson MD;  Location: [facility]     ESOPHAGOSCOPY, GASTROSCOPY, DUODENOSCOPY (EGD), COMBINED N/A 2018    Procedure: COMBINED ESOPHAGOSCOPY, GASTROSCOPY, DUODENOSCOPY (EGD), BIOPSY SINGLE OR MULTIPLE;;  Surgeon: Don Arambula MD;  Location: [facility]     ESOPHAGOSCOPY, GASTROSCOPY, DUODENOSCOPY (EGD), DILATATION, COMBINED N/A  2018    Procedure: COMBINED ESOPHAGOSCOPY, GASTROSCOPY, DUODENOSCOPY (EGD), DILATATION;  Esophagogastroduodenoscopy with Dilatation and Biopsy by Biopsy, Colonoscopy;  Surgeon: Don Arambula MD;  Location: [facility]      COLONOSCOPY THRU STOMA, DIAGNOSTIC  11/15/2000    Recurrent abdominal pain     HC COLONOSCOPY W/WO BRUSH/WASH  2004     HC REPAIR INCISIONAL HERNIA,REDUCIBLE  11/10/2000    Hernia Repair, Incisional, Unilateral, Left inguinal hernia and umbilical hernia     HC UGI ENDOSCOPY DIAG W OR W/O BRUSH/WASH  2004     HC UGI ENDOSCOPY DIAG W OR W/O BRUSH/WASH  2005    Peptic stricture of esophagus, dilated to 18 mm. Erosive reflux esophagitis. Hiatal hernia. Erosive duodenitis.      UGI ENDOSCOPY, SIMPLE EXAM  07      UGI ENDOSCOPY, SIMPLE EXAM  08    Schiatzky's ring, dialated, PPI indefinitely      UGI ENDOSCOPY, SIMPLE EXAM  12/15/2009    benign stricture with dialation     LAPAROSCOPIC APPENDECTOMY  09/15/2005       Anesthesia Evaluation     . Pt has had prior anesthetic. Type: General and MAC    No history of anesthetic complications          ROS/MED HX    ENT/Pulmonary:     (+)tobacco use, Current use 1ppd packs/day  mild COPD, , recent URI Simple chronic bronchitis: . .    Neurologic:  - neg neurologic ROS     Cardiovascular:     (+) hypertension----. : . . . :. . Previous cardiac testing date:results:Stress Testdate:2016 results:ECHO STRESS WITH OPTISON   Order: [id number]   Status: Final result   Visible to patient: No (Inaccessible in MyChart) Next appt: None Dx:  Atypical chest pain   Details     Reading Physician Reading Date Result Priority  Guanako Marin MD 2016   Narrative        Interpretation Summary                    [facility]  Echocardiography Laboratory  [address] [place] [address]        Name: MARY MONTOYA  MRN: [id number]  : 1935  Study Date: 2016 02:17 PM  Age: 81  yrs  Gender: Male  Patient Location: [facility]  Reason For Study: Chest Pressure, Other chest pain  History: Smoker  Ordering Physician: WILLAM RIOS  Referring Physician: Roberta Beaver MD  Performed By: Anton Luther     BSA: 2.1 m2  Height: 70 in  Weight: 198 lb  HR: 93  BP: 104/70 mmHg  ______________________________________________________________________________        Procedure  Stress Echo Complete. Contrast Optison.  ______________________________________________________________________________     Interpretation Summary  Normal resting wall motion and no stress-induced wall motion abnormality.  This was a normal stress echocardiogram with no evidence of stress-induced  ischemia on echo images.  Exercise and EKG portion reported separately.  ______________________________________________________________________________     Stress  Exercise was stopped due to fatigue.  Exercise and EKG portion reported separately.  The visual ejection fraction is estimated at >70%.  Left ventricular cavity size decreases with exercise.  Global LV systolic function augments with exercise.  Normal resting wall motion and no stress-induced wall motion abnormality.  This was a normal stress echocardiogram with no evidence of stress-induced  ischemia.     Baseline  No regional wall motion abnormalities noted.  The visual ejection fraction is estimated at 60-65%.     Stress Results         Protocol: [place] [facility]         Maximum Predicted HR:  139 bpm        Target HR:     118 bpm% Max           imum Predicted HR:   96 %            +---------+--------+----------+------+------------------------+         :  Stage  :Duration:Heart Rate:  BPCom           ment         :         :         :(mm:ss) :  (bpm)   :      :                        :         +---------+--------+----------+------+------------------------+         : Stage 1 :  3:00 12     1    :128/70:                        :          +---------+--------+----------+------+------------------------+         : Stage 2 :  1:44 13     3    :134/62:[id number] FAC:TITA/DUKE 4:         +---------+--------+----------+------+------------------------+         :RECOVERY :  5:06 90          :128/88:                        :         +---------+--------+----------+------+------------------------+             Stress Duration:  4:44 mm:ss        Recovery Time: 5:06 mm:ss         Maximum Stress HR:   133 bpmM              ETS:          7        Left Ventricle  Left ventricular systolic function is normal. The visual ejection fraction is  estimated at 60-65%. No regional wall motion abnormalities noted.     Tricuspid Valve  There is trace tricuspid regurgitation. The right ventricular systolic  pressure is approximated at 24.2 mmHg plus the right atrial pressure.     Aortic Valve  The aortic valve is trileaflet with aortic valve sclerosis. No aortic  stenosis is present.  ______________________________________________________________________________           Doppler Measurements & Calculations  TR max esperanza: 246.0 cm/sec  TR max P.2 mmHg              ______________________________________________________________________________        Report approved by: Andreea Jerome 2016 03:56 PM       Specimen Collected: 16  2:17 PM Last Resulted: 16  3:56 PM Order Details View Encounter Lab and Collection Details Routing          ECG reviewed date:2018 results:Sinus  Rhythm  - occasional ectopic ventricular beat     -Old anteroseptal infarct.     ABNORMAL date: results:         (-) CAD   METS/Exercise Tolerance:     Hematologic:  - neg hematologic  ROS      (-) anemia   Musculoskeletal:   (+) arthritis,  -       GI/Hepatic:     (+) GERD Symptomatic, hiatal hernia,       Renal/Genitourinary:  - ROS Renal section negative       Endo:  - neg endo ROS       Psychiatric:     (+) psychiatric history anxiety      Infectious Disease:  - neg infectious  "disease ROS       Malignancy:      - no malignancy   Other:    - neg other ROS                      Physical Exam  Normal systems: cardiovascular and pulmonary    Airway   Mallampati: II  TM distance: >3 FB  Neck ROM: full    Dental     Cardiovascular   Rhythm and rate: regular and normal      Pulmonary    breath sounds clear to auscultation            Lab Results   Component Value Date    WBC 8.6 05/20/2020    HGB 15.5 05/21/2020    HCT 43.1 05/20/2020     05/20/2020    CRP 24.4 (H) 07/16/2018    SED 8 02/14/2013     (L) 05/21/2020    POTASSIUM 3.8 05/21/2020    CHLORIDE 97 05/21/2020    CO2 28 05/21/2020    BUN 9 05/21/2020    CR 0.89 05/21/2020     (H) 05/21/2020    LAKISHA 8.3 (L) 05/21/2020    PHOS 3.0 03/06/2020    MAG 2.0 03/06/2020    ALBUMIN 3.2 (L) 03/06/2020    PROTTOTAL 6.9 03/06/2020    ALT 15 03/06/2020    AST 14 03/06/2020    ALKPHOS 86 03/06/2020    BILITOTAL 0.7 03/06/2020    BILIDIRECT 0 04/05/2002    LIPASE 101 09/15/2005    PTT 30 02/17/2019    INR 1.02 05/20/2020    TSH 1.88 03/04/2020       Preop Vitals  BP Readings from Last 3 Encounters:   05/21/20 (!) 180/90   03/06/20 (!) 146/84   03/04/20 96/52    Pulse Readings from Last 3 Encounters:   05/21/20 79   03/06/20 64   03/04/20 74      Resp Readings from Last 3 Encounters:   05/21/20 20   03/06/20 22   03/04/20 24    SpO2 Readings from Last 3 Encounters:   05/21/20 91%   03/06/20 97%   03/04/20 95%      Temp Readings from Last 1 Encounters:   05/21/20 96.3  F (35.7  C) (Oral)    Ht Readings from Last 1 Encounters:   06/19/19 1.765 m (5' 9.5\")      Wt Readings from Last 1 Encounters:   05/20/20 85.3 kg (188 lb)    Estimated body mass index is 27.36 kg/m  as calculated from the following:    Height as of 6/19/19: 1.765 m (5' 9.5\").    Weight as of this encounter: 85.3 kg (188 lb).       Anesthesia Plan      History & Physical Review      ASA Status:  3 emergent.    NPO Status:  > 8 hours    Plan for General with Intravenous and " Propofol induction. Maintenance will be Balanced.    PONV prophylaxis:  Ondansetron (or other 5HT-3) and Dexamethasone or Solumedrol    The patient is a current Smokerpatient did not smoke on day of surgery     Postoperative Care  Postoperative pain management:  Oral pain medications and IV analgesics.      Consents  Anesthetic plan, risks, benefits and alternatives discussed with:  Patient..                 BHAVANA Paul CRNA

## 2020-05-21 NOTE — BRIEF OP NOTE
[facility] Orthopedic Brief Operative Note    Pre-operative diagnosis: Left femoral neck fracture.   Post-operative diagnosis: Same   Procedure: Left hip bipolar hemiarthroplasty.   Surgeon: Pola Rose MD   Assistant(s): Giancarlo Rodrgíuez   Anesthesia: General endotracheal anesthesia   Estimated blood loss: 100 cc   Total IV fluids: (See anesthesia record)   Blood transfusion: No transfusion was given during surgery   Total urine output: (See anesthesia record)   Drains: None   Specimens: Femoral head removed, not sent.   Implants: Vijay Accolade II size 8 stem.  28 mms head with standard neck.  52 mms bipolar head   Findings: Displaced femoral neck fracture.   Complications: None   Condition: Stable   Weight bearing status: Weight bearing as tolerated   Activity: Activity as tolerated  Patient may move about with assist as indicated or with supervision  Limit flexion to 90 degrees for 6 weeks.  Abduction pillow in bed.   Orthotic management: Not applicable   Comments: See dictated operative report for full details

## 2020-05-21 NOTE — ANESTHESIA CARE TRANSFER NOTE
Patient: Jerry Rodriguez    Procedure(s):  HEMIARTHROPLASTY, HIP, BIPOLAR    Diagnosis: Hip fracture (H) [S72.009A]  Diagnosis Additional Information: No value filed.    Anesthesia Type:   General     Note:  Airway :Face Mask  Patient transferred to:PACU  Handoff Report: Identifed the Patient, Identified the Reponsible Provider, Reviewed the pertinent medical history, Discussed the surgical course, Reviewed Intra-OP anesthesia mangement and issues during anesthesia, Set expectations for post-procedure period and Allowed opportunity for questions and acknowledgement of understanding      Vitals: (Last set prior to Anesthesia Care Transfer)    CRNA VITALS  5/21/2020 1623 - 5/21/2020 1701      5/21/2020             SpO2:  99 %                Electronically Signed By: BHAVANA Paul CRNA  May 21, 2020  5:01 PM

## 2020-05-21 NOTE — PLAN OF CARE
S-(situation): Patient arrives to room 265 via cart from ED    B-(background): Fall, left femur fracture    A-(assessment): A&Ox4. VSS except elevated BP. Pain managed with IV dilaudid, tylenol. CMS and neuros intact, edema 2+ LLE. LS clear. BS active, passing gas. Denies nausea. NPO at midnight for surgery in AM. PIV with NS infusing at 100.     R-(recommendations): Orders reviewed with patient. Will monitor patient per MD orders.     Inpatient nursing criteria listed below were met:    Health care directives status obtained and documented: Yes  SCD's Documented: Yes  Skin issues/needs documented:NA  Isolation needs addressed, if appropriate: NA  Fall Prevention: Care plan updated, Education given and documented Yes  MRSA swab completed for ICU admissions only: NA  Care Plan initiated: Yes  Education Assessment documented:Yes  Education Documented (Pre-existing chronic infection such as, MRSA/VRE need education on admission): Yes  Admission Medication Reconciliation completed: Yes  New medication patient education completed and documented (Possible Side Effects of Common Medications handout): Yes  Home medications if not able to send immediately home with family stored here: NA  Reminder note placed in discharge instructions: NA  Discharge planning review completed (admission navigator) Yes

## 2020-05-21 NOTE — CONSULTS
[facility] Orthopedic Consultation    Jerry Rodriguez MRN# [id number]   Age: 85 year old YOB: 1935     Date of Admission:  5/20/2020    Reason for consult: Femoral neck fracture       Requesting physician: Dr. Ned Herrera        Level of consult: Consult, follow and place orders           Assessment and Plan:   Assessment:   Left Femoral neck fracture - Garden 3 displaced.  This has high likelihood of necrosis, so we will plan hemiarthroplasty.      Plan:   Left hip hemiarthroplasty with general anesthesia .  Covid test is negative.  He will be allowed weight bearing as tolerated postoperative.            Chief Complaint:   Femoral neck fracture       History is obtained from the patient         History of Present Illness:   This patient is a 85 year old male who presents with the following condition requiring a hospital admission:      Hip fracture:    He presents with history of a fall at home in his garage resulting in inability to walk on the left. This occurred yesterday .              Past Medical History:     Past Medical History:   Diagnosis Date     Anxiety state, unspecified      Arthritis      Hypertension      Hyponatremia      Inguinal hernia with obstruction, without mention of gangrene, unilateral or unspecified, (not specified as recurrent)     Strangulated right inguinal hernia years ago     Inguinal hernia without mention of obstruction or gangrene, unilateral or unspecified, (not specified as recurrent) 11/07/2000    Left inguinal hernia, sliding and not incarcerated     Other joint derangement, not elsewhere classified, forearm 1980    traumatic fracture     Pathologic fracture of neck of femur (H)     Surgery for hip fracture     Stricture and stenosis of esophagus 2004             Past Surgical History:     Past Surgical History:   Procedure Laterality Date     C OSTEOTOMY HIP/FEMUR  1980    traumatic fracture right hip     COLONOSCOPY  10/17/2007    Repeat  Colonoscopy in 5 years for surveillance.     COLONOSCOPY N/A 1/24/2018    Procedure: COLONOSCOPY;;  Surgeon: Don Arambula MD;  Location: [facility]     ESOPHAGOSCOPY, GASTROSCOPY, DUODENOSCOPY (EGD), COMBINED  1/4/2011    COMBINED ESOPHAGOSCOPY, GASTROSCOPY, DUODENOSCOPY (EGD), ESOPHAGEAL DILATION BALLOON LESS THAN 30MM performed by ELEN BUSBY at  GI     ESOPHAGOSCOPY, GASTROSCOPY, DUODENOSCOPY (EGD), COMBINED N/A 9/25/2015    Procedure: COMBINED ESOPHAGOSCOPY, GASTROSCOPY, DUODENOSCOPY (EGD), BIOPSY SINGLE OR MULTIPLE;  Surgeon: Markell Lawson MD;  Location: [facility]     ESOPHAGOSCOPY, GASTROSCOPY, DUODENOSCOPY (EGD), COMBINED N/A 1/24/2018    Procedure: COMBINED ESOPHAGOSCOPY, GASTROSCOPY, DUODENOSCOPY (EGD), BIOPSY SINGLE OR MULTIPLE;;  Surgeon: Don Arambula MD;  Location: [facility]     ESOPHAGOSCOPY, GASTROSCOPY, DUODENOSCOPY (EGD), DILATATION, COMBINED N/A 1/24/2018    Procedure: COMBINED ESOPHAGOSCOPY, GASTROSCOPY, DUODENOSCOPY (EGD), DILATATION;  Esophagogastroduodenoscopy with Dilatation and Biopsy by Biopsy, Colonoscopy;  Surgeon: Don Arambula MD;  Location:  GI      COLONOSCOPY THRU STOMA, DIAGNOSTIC  11/15/2000    Recurrent abdominal pain     HC COLONOSCOPY W/WO BRUSH/WASH  07/19/2004     HC REPAIR INCISIONAL HERNIA,REDUCIBLE  11/10/2000    Hernia Repair, Incisional, Unilateral, Left inguinal hernia and umbilical hernia     HC UGI ENDOSCOPY DIAG W OR W/O BRUSH/WASH  07/19/2004     HC UGI ENDOSCOPY DIAG W OR W/O BRUSH/WASH  05/24/2005    Peptic stricture of esophagus, dilated to 18 mm. Erosive reflux esophagitis. Hiatal hernia. Erosive duodenitis.      UGI ENDOSCOPY, SIMPLE EXAM  02/20/07     HC UGI ENDOSCOPY, SIMPLE EXAM  12/02/08    Schiatzky's ring, dialated, PPI indefinitely     HC UGI ENDOSCOPY, SIMPLE EXAM  12/15/2009    benign stricture with dialation     LAPAROSCOPIC APPENDECTOMY  09/15/2005             Social History:     Social History     Tobacco Use     Smoking  status: Current Every Day Smoker     Packs/day: 1.00     Years: 52.00     Pack years: 52.00     Types: Cigarettes     Smokeless tobacco: Current User     Types: Chew     Tobacco comment: occasionally/ 1.5tin qwk   Substance Use Topics     Alcohol use: No     Comment: quit 7-1980             Family History:     Family History   Problem Relation Age of Onset     Cancer Sister         brain tumor     Diabetes Sister      Alzheimer Disease Sister      Family history reviewed          Immunizations:     Immunization History   Administered Date(s) Administered     Influenza (H1N1) 01/11/2010     Influenza (High Dose) 3 valent vaccine 10/14/2011, 09/04/2015, 10/13/2016, 10/31/2017     Influenza (IIV3) PF 11/25/2000, 10/31/2001, 11/04/2002, 01/31/2005, 10/17/2005, 11/13/2006, 10/18/2007, 10/16/2008, 09/22/2009, 10/19/2010     Influenza Quad, Recombinant, p-free (RIV4) 10/31/2019     Pneumo Conj 13-V (2010&after) 10/13/2016     Pneumococcal 23 valent 11/01/2001, 10/17/2005     TD (ADULT, 7+) 10/28/1999, 10/25/2000, 02/04/2010             Allergies:     Allergies   Allergen Reactions     No Known Drug Allergies              Medications:     Medications Prior to Admission   Medication Sig Dispense Refill Last Dose     busPIRone (BUSPAR) 5 MG tablet TAKE ONE TABLET BY MOUTH TWICE DAILY  180 tablet 1 5/20/2020 at 0800     fluticasone (FLONASE) 50 MCG/ACT spray Spray 1-2 sprays into both nostrils daily 1 Bottle 11 5/20/2020 at 0800     multivitamin, therapeutic with minerals (MULTI-VITAMIN) TABS Take 1 tablet by mouth daily   5/20/2020 at 0800     timolol (TIMOPTIC) 0.5 % ophthalmic solution Apply 1 drop to eye 2 times daily   5/20/2020 at 0800     VENTOLIN  (90 Base) MCG/ACT inhaler INHALE TWO PUFFS BY MOUTH EVERY SIX HOURS AS NEEDED FOR SHORTNESS OF BREATH OR WHEEZING. LAST REFILL UNTIL SEEN  18 g 3 5/19/2020 at 2100     Spacer/Aero-Holding Chambers (VALVED HOLDING CHAMBER) JULISSA 1 Device 2 times daily To be used with  flovent 1 each 1              Review of Systems:   The Review of Systems is negative other than noted in the HPI          Physical Exam:   Temp: 96.3  F (35.7  C) Temp src: Oral BP: (!) 180/90 Pulse: 79 Heart Rate: 95 Resp: 20 SpO2: 91 % O2 Device: None (Room air)    All vitals have been reviewed  Musculoskeletal:   LEFT HIP:  swelling present  tenderness present  Leg is shortened and externally rotated.  Sensory intact.             Data:     Results for orders placed or performed during the hospital encounter of 05/20/20 (from the past 24 hour(s))   CBC with platelets differential   Result Value Ref Range    WBC 8.6 4.0 - 11.0 10e9/L    RBC Count 4.91 4.4 - 5.9 10e12/L    Hemoglobin 14.4 13.3 - 17.7 g/dL    Hematocrit 43.1 40.0 - 53.0 %    MCV 88 78 - 100 fl    MCH 29.3 26.5 - 33.0 pg    MCHC 33.4 31.5 - 36.5 g/dL    RDW 12.5 10.0 - 15.0 %    Platelet Count 178 150 - 450 10e9/L    Diff Method Automated Method     % Neutrophils 72.7 %    % Lymphocytes 15.4 %    % Monocytes 8.9 %    % Eosinophils 2.0 %    % Basophils 0.3 %    % Immature Granulocytes 0.7 %    Nucleated RBCs 0 0 /100    Absolute Neutrophil 6.2 1.6 - 8.3 10e9/L    Absolute Lymphocytes 1.3 0.8 - 5.3 10e9/L    Absolute Monocytes 0.8 0.0 - 1.3 10e9/L    Absolute Basophils 0.0 0.0 - 0.2 10e9/L    Abs Immature Granulocytes 0.1 0 - 0.4 10e9/L    Absolute Nucleated RBC 0.0    Basic metabolic panel   Result Value Ref Range    Sodium 130 (L) 133 - 144 mmol/L    Potassium 4.0 3.4 - 5.3 mmol/L    Chloride 98 94 - 109 mmol/L    Carbon Dioxide 28 20 - 32 mmol/L    Anion Gap 4 3 - 14 mmol/L    Glucose 105 (H) 70 - 99 mg/dL    Urea Nitrogen 8 7 - 30 mg/dL    Creatinine 1.05 0.66 - 1.25 mg/dL    GFR Estimate 64 >60 mL/min/[1.73_m2]    GFR Estimate If Black 75 >60 mL/min/[1.73_m2]    Calcium 8.0 (L) 8.5 - 10.1 mg/dL   INR   Result Value Ref Range    INR 1.02 0.86 - 1.14   XR Pelvis and Hip Left 2 Views    Narrative    EXAM: XR PELVIS AND HIP LEFT 2 VIEWS  LOCATION:  [facility]  DATE/TIME: 5/20/2020 6:08 PM    INDICATION: Trauma, pain.  COMPARISON: None.    FINDINGS: Lower lumbar spine degenerative change. Plate-pin fixation of the proximal right femur. There is right hip joint space narrowing which is prominent superomedially.      Impression    IMPRESSION: Left femoral neck fracture with displacement, impaction, and angulation.   Asymptomatic COVID-19 Virus (Coronavirus) by PCR    Specimen: Nasopharyngeal   Result Value Ref Range    COVID-19 Virus PCR to [facility] - Source Nasopharyngeal     COVID-19 Virus PCR to [facility] - Result       Test received-See reflex to IDDL test SARS CoV2 (COVID-19) Virus RT-PCR   SARS-CoV-2 COVID-19 Virus (Coronavirus) RT-PCR Nasopharyngeal    Specimen: Nasopharyngeal   Result Value Ref Range    SARS-CoV-2 Virus Specimen Source Nasopharyngeal     SARS-CoV-2 PCR Result NEGATIVE     SARS-CoV-2 PCR Comment       The Simplexa COVID-19 direct PCR assay by SMAT[employer]OS on the [employer] instrument has been   given Emergency Use Authorization (EUA) for the in vitro qualitative detection of RNA from   the SARS-CoV2 virus in nasopharyngeal swabs in viral transport medium from patients with   signs and symptoms of infection who are suspected of COVID-19. Performance is unknown in   asymptomatic patients.     Hemoglobin   Result Value Ref Range    Hemoglobin 15.5 13.3 - 17.7 g/dL   Basic metabolic panel   Result Value Ref Range    Sodium 130 (L) 133 - 144 mmol/L    Potassium 3.8 3.4 - 5.3 mmol/L    Chloride 97 94 - 109 mmol/L    Carbon Dioxide 28 20 - 32 mmol/L    Anion Gap 5 3 - 14 mmol/L    Glucose 120 (H) 70 - 99 mg/dL    Urea Nitrogen 9 7 - 30 mg/dL    Creatinine 0.89 0.66 - 1.25 mg/dL    GFR Estimate 78 >60 mL/min/[1.73_m2]    GFR Estimate If Black >90 >60 mL/min/[1.73_m2]    Calcium 8.3 (L) 8.5 - 10.1 mg/dL          Attestation:  Amount of time performed on this consult: 30 minutes.    Pola Rose MD

## 2020-05-21 NOTE — ED NOTES
ED Nursing criteria listed below was addressed during verbal handoff:     Abnormal vitals: Yes  Abnormal results: Yes  Med Reconciliation completed: Yes  Meds given in ED: Yes  Any Overdue Meds: Yes  Core Measures: N/A  Isolation: N/A  Special needs: Yes  Skin assessment: Yes    Observation Patient  Education provided: N/A

## 2020-05-22 ENCOUNTER — APPOINTMENT (OUTPATIENT)
Dept: PHYSICAL THERAPY | Facility: CLINIC | Age: 85
DRG: 470 | End: 2020-05-22
Payer: COMMERCIAL

## 2020-05-22 ENCOUNTER — APPOINTMENT (OUTPATIENT)
Dept: PHYSICAL THERAPY | Facility: CLINIC | Age: 85
DRG: 470 | End: 2020-05-22
Attending: ORTHOPAEDIC SURGERY
Payer: COMMERCIAL

## 2020-05-22 PROBLEM — N17.9 ACUTE KIDNEY INJURY (H): Status: ACTIVE | Noted: 2020-05-22

## 2020-05-22 PROBLEM — R03.0 ELEVATED BLOOD PRESSURE READING WITHOUT DIAGNOSIS OF HYPERTENSION: Status: ACTIVE | Noted: 2020-05-22

## 2020-05-22 PROBLEM — S72.002A CLOSED LEFT HIP FRACTURE (H): Status: RESOLVED | Noted: 2020-05-20 | Resolved: 2020-05-22

## 2020-05-22 LAB
ANION GAP SERPL CALCULATED.3IONS-SCNC: 8 MMOL/L (ref 3–14)
BUN SERPL-MCNC: 15 MG/DL (ref 7–30)
CALCIUM SERPL-MCNC: 7.3 MG/DL (ref 8.5–10.1)
CHLORIDE SERPL-SCNC: 104 MMOL/L (ref 94–109)
CO2 SERPL-SCNC: 21 MMOL/L (ref 20–32)
CREAT SERPL-MCNC: 1.12 MG/DL (ref 0.66–1.25)
CREAT SERPL-MCNC: 1.42 MG/DL (ref 0.66–1.25)
GFR SERPL CREATININE-BSD FRML MDRD: 45 ML/MIN/{1.73_M2}
GFR SERPL CREATININE-BSD FRML MDRD: 59 ML/MIN/{1.73_M2}
GLUCOSE SERPL-MCNC: 111 MG/DL (ref 70–99)
GLUCOSE SERPL-MCNC: 113 MG/DL (ref 70–99)
HGB BLD-MCNC: 12.8 G/DL (ref 13.3–17.7)
POTASSIUM SERPL-SCNC: 3.5 MMOL/L (ref 3.4–5.3)
SARS-COV-2 PCR COMMENT: NORMAL
SARS-COV-2 RNA SPEC QL NAA+PROBE: NEGATIVE
SARS-COV-2 RNA SPEC QL NAA+PROBE: NORMAL
SODIUM SERPL-SCNC: 133 MMOL/L (ref 133–144)
SPECIMEN SOURCE: NORMAL
SPECIMEN SOURCE: NORMAL

## 2020-05-22 PROCEDURE — 82947 ASSAY GLUCOSE BLOOD QUANT: CPT | Performed by: ORTHOPAEDIC SURGERY

## 2020-05-22 PROCEDURE — 97530 THERAPEUTIC ACTIVITIES: CPT | Mod: GP | Performed by: PHYSICAL THERAPIST

## 2020-05-22 PROCEDURE — 85018 HEMOGLOBIN: CPT | Performed by: ORTHOPAEDIC SURGERY

## 2020-05-22 PROCEDURE — 97161 PT EVAL LOW COMPLEX 20 MIN: CPT | Mod: GP | Performed by: PHYSICAL THERAPIST

## 2020-05-22 PROCEDURE — 99207 ZZC CDG-MDM COMPONENT: MEETS MODERATE - UP CODED: CPT | Performed by: FAMILY MEDICINE

## 2020-05-22 PROCEDURE — 25000132 ZZH RX MED GY IP 250 OP 250 PS 637: Performed by: FAMILY MEDICINE

## 2020-05-22 PROCEDURE — 99233 SBSQ HOSP IP/OBS HIGH 50: CPT | Performed by: FAMILY MEDICINE

## 2020-05-22 PROCEDURE — 25000132 ZZH RX MED GY IP 250 OP 250 PS 637: Performed by: ORTHOPAEDIC SURGERY

## 2020-05-22 PROCEDURE — 25800030 ZZH RX IP 258 OP 636: Performed by: FAMILY MEDICINE

## 2020-05-22 PROCEDURE — 36415 COLL VENOUS BLD VENIPUNCTURE: CPT | Performed by: FAMILY MEDICINE

## 2020-05-22 PROCEDURE — 97110 THERAPEUTIC EXERCISES: CPT | Mod: GP | Performed by: PHYSICAL THERAPIST

## 2020-05-22 PROCEDURE — 25000128 H RX IP 250 OP 636: Performed by: ORTHOPAEDIC SURGERY

## 2020-05-22 PROCEDURE — 36415 COLL VENOUS BLD VENIPUNCTURE: CPT | Performed by: ORTHOPAEDIC SURGERY

## 2020-05-22 PROCEDURE — 82565 ASSAY OF CREATININE: CPT | Performed by: FAMILY MEDICINE

## 2020-05-22 PROCEDURE — 80048 BASIC METABOLIC PNL TOTAL CA: CPT | Performed by: FAMILY MEDICINE

## 2020-05-22 PROCEDURE — 12000000 ZZH R&B MED SURG/OB

## 2020-05-22 PROCEDURE — U0003 INFECTIOUS AGENT DETECTION BY NUCLEIC ACID (DNA OR RNA); SEVERE ACUTE RESPIRATORY SYNDROME CORONAVIRUS 2 (SARS-COV-2) (CORONAVIRUS DISEASE [COVID-19]), AMPLIFIED PROBE TECHNIQUE, MAKING USE OF HIGH THROUGHPUT TECHNOLOGIES AS DESCRIBED BY CMS-2020-01-R: HCPCS | Performed by: FAMILY MEDICINE

## 2020-05-22 PROCEDURE — 25800030 ZZH RX IP 258 OP 636: Performed by: ORTHOPAEDIC SURGERY

## 2020-05-22 RX ORDER — HYDRALAZINE HYDROCHLORIDE 20 MG/ML
5 INJECTION INTRAMUSCULAR; INTRAVENOUS EVERY 6 HOURS PRN
Status: DISCONTINUED | OUTPATIENT
Start: 2020-05-22 | End: 2020-05-23 | Stop reason: HOSPADM

## 2020-05-22 RX ORDER — BENZTROPINE MESYLATE 0.5 MG/1
1-2 TABLET ORAL 3 TIMES DAILY PRN
Status: DISCONTINUED | OUTPATIENT
Start: 2020-05-22 | End: 2020-05-23 | Stop reason: HOSPADM

## 2020-05-22 RX ADMIN — OMEPRAZOLE 20 MG: 20 CAPSULE, DELAYED RELEASE ORAL at 10:01

## 2020-05-22 RX ADMIN — POLYETHYLENE GLYCOL 3350 17 G: 17 POWDER, FOR SOLUTION ORAL at 09:37

## 2020-05-22 RX ADMIN — OXYCODONE HYDROCHLORIDE 5 MG: 5 TABLET ORAL at 17:24

## 2020-05-22 RX ADMIN — ASPIRIN 325 MG: 325 TABLET, COATED ORAL at 09:37

## 2020-05-22 RX ADMIN — SODIUM CHLORIDE: 9 INJECTION, SOLUTION INTRAVENOUS at 05:11

## 2020-05-22 RX ADMIN — Medication 1 MG: at 00:15

## 2020-05-22 RX ADMIN — SODIUM CHLORIDE 500 ML: 9 INJECTION, SOLUTION INTRAVENOUS at 09:47

## 2020-05-22 RX ADMIN — TIMOLOL MALEATE 1 DROP: 5 SOLUTION/ DROPS OPHTHALMIC at 09:39

## 2020-05-22 RX ADMIN — CEFAZOLIN SODIUM 2 G: 2 INJECTION, SOLUTION INTRAVENOUS at 06:44

## 2020-05-22 RX ADMIN — BUSPIRONE HYDROCHLORIDE 5 MG: 5 TABLET ORAL at 09:37

## 2020-05-22 RX ADMIN — ASPIRIN 325 MG: 325 TABLET, COATED ORAL at 20:21

## 2020-05-22 RX ADMIN — ACETAMINOPHEN 975 MG: 325 TABLET, FILM COATED ORAL at 19:22

## 2020-05-22 RX ADMIN — ACETAMINOPHEN 975 MG: 325 TABLET, FILM COATED ORAL at 03:32

## 2020-05-22 RX ADMIN — TIMOLOL MALEATE 1 DROP: 5 SOLUTION/ DROPS OPHTHALMIC at 20:16

## 2020-05-22 RX ADMIN — SODIUM CHLORIDE: 9 INJECTION, SOLUTION INTRAVENOUS at 16:01

## 2020-05-22 RX ADMIN — SENNOSIDES AND DOCUSATE SODIUM 2 TABLET: 8.6; 5 TABLET ORAL at 09:36

## 2020-05-22 RX ADMIN — NICOTINE 1 PATCH: 21 PATCH TRANSDERMAL at 20:17

## 2020-05-22 RX ADMIN — SENNOSIDES AND DOCUSATE SODIUM 2 TABLET: 8.6; 5 TABLET ORAL at 20:15

## 2020-05-22 RX ADMIN — BUSPIRONE HYDROCHLORIDE 5 MG: 5 TABLET ORAL at 20:15

## 2020-05-22 RX ADMIN — ACETAMINOPHEN 975 MG: 325 TABLET, FILM COATED ORAL at 11:14

## 2020-05-22 RX ADMIN — OMEPRAZOLE 20 MG: 20 CAPSULE, DELAYED RELEASE ORAL at 17:13

## 2020-05-22 ASSESSMENT — ACTIVITIES OF DAILY LIVING (ADL)
ADLS_ACUITY_SCORE: 16
ADLS_ACUITY_SCORE: 16
ADLS_ACUITY_SCORE: 15

## 2020-05-22 NOTE — ANESTHESIA POSTPROCEDURE EVALUATION
Patient: Jerry Rodriguez    Procedure(s):  HEMIARTHROPLASTY, HIP, BIPOLAR    Diagnosis:Hip fracture (H) [S72.009A]  Diagnosis Additional Information: No value filed.    Anesthesia Type:  General    Note:  Anesthesia Post Evaluation    Patient location during evaluation: Floor  Patient participation: Unable to participate in evaluation secondary to underlying medical condition  Level of consciousness: awake  Pain management: adequate  Airway patency: patent  Cardiovascular status: acceptable and hemodynamically stable  Respiratory status: acceptable, nonlabored ventilation and nasal cannula  Hydration status: stable  PONV: none     Anesthetic complications: None    Comments: Patient was happy with the anesthesia care received and no anesthesia related complications were noted.  He remains pleasant but confused as pre-op  .I will follow up with the patient again if it is needed.          Last vitals:  Vitals:    05/21/20 2253 05/22/20 0330 05/22/20 0731   BP: 139/75 136/71 139/65   Pulse: 75 71 74   Resp: 18 18 20   Temp: 96.4  F (35.8  C) 97.2  F (36.2  C) 97  F (36.1  C)   SpO2: 94% 95% 93%         Electronically Signed By: BHAVANA Espinoza CRNA  May 22, 2020  10:14 AM

## 2020-05-22 NOTE — PLAN OF CARE
Discharge Planner PT   Patient plan for discharge: home/TCU  Current status: max A for transfers at this time, with A of 2 needed on first time up; fatigues quickly.  Barriers to return to prior living situation: amount of assist and new learning needed for safe mobility, transfers and ex/precaution training.  Recommendations for discharge: TCU  Rationale for recommendations: amount of change from baseline.  (85 y.o. male who tries very hard with all mobilty and ex. He fatigues easily at this time and needs help in new mobility and transfer training with new precautions and s/p surgical deconditioning affects.)       Entered by: Cami Francisco 05/22/2020 9:55 AM

## 2020-05-22 NOTE — PLAN OF CARE
Pain in left hip has been well-controlled with scheduled Tylenol and ice packs. Good CMS. Up with max assist of two per PT. Poor oral intake and very focused on taking his pills one at a time (called staff in several times to talk about this). Dressing was CDI. Tiny skin slits above dressing (0.5cm in length) were covered with tegaderm. Good CMS. VSS Received IV bolus of 500ml saline and started on Prilosec. IV fluids continue. Condom cath collected kehinde urine in amount of    ml. Bed alarm on. Will continue to monitor CMS, pain, dressing, safety, I&O.

## 2020-05-22 NOTE — PROGRESS NOTES
Per patient's request, I called and spoke with his Great Niece, Anay.  Updated her on his status, plan, explained the surgery, and nature of the post-operative course.  She was very thankful. All questions answered.     BHAVANA Garrett, CNP  Orthopedic Surgery

## 2020-05-22 NOTE — PROGRESS NOTES
[facility]    Medicine Progress Note - Hospitalist Service       Date of Admission:  5/20/2020  Assessment & Plan    Patient was admitted on 5/20/2020 after a fall at home when he tripped on uneven ground.  He has been found to have a left femoral neck fracture with angulation that has undergone surgical repair on 5/21/2020 and is postop day #1.  Patient's pain is well controlled, he is alert, not requiring oxygen.  He does have an acute kidney injury following surgical intervention with creatinine increasing from 0.89 up to 1.42 with decreased oral intake overnight.    Principal Problem:    Closed fracture of left hip; Femoral neck fracture (H)     Assessment: Postoperative day #1 following left hip hemiarthroplasty.  Hemoglobin decreased from 15.5 down to 12.8 with patient hemodynamically stable and pain well controlled with oral medications.      Plan: ongoing management per orthopedic surgery team.  Physical therapy and Occupational Therapy will be working with the patient today but at this time it is anticipated he will need TCU at time of discharge.    Active Problems:    Acute kidney injury (H)    Assessment: Developing in the last 24 hours, possibly secondary to surgical intervention in combination with volume status versus other etiology.  Patient's oral intake overnight has been fairly poor.  Currently is on 100 cc of normal saline an hour    Plan: We will provide a 500 cc fluid bolus over the next few hours and recheck a creatinine at 1400 for reassessment.        Elevated blood pressure reading without diagnosis of hypertension    Assessment: Noted on initial presentation and thought secondary to pain with blood pressure is significantly improving after surgical intervention and have been normal so far today.    Plan: We will continue to monitor at this time, PRN antihypertensive agents for significant blood pressure elevation are available.      Hyponatremia     Assessment: Chronic and stable with sodium actually slightly higher than his previous baseline today.  Patient is not on any salt tablets or fluid restriction at baseline and states he just salts his food well and thinks he is pretty stable.    Plan: Continue to monitor for stability.        Tobacco use disorder    Assessment: Patient smokes 1 pack/day, currently on nicotine replacement and denies any withdrawal symptoms    Plan: Continue with nicotine replacement during his hospital stay.       Diet: Advance Diet as Tolerated: Regular Diet Adult    DVT Prophylaxis: Pneumatic Compression Devices  Streeter Catheter: not present  Code Status: Full Code           Disposition Plan   Expected discharge: 1-2 days, recommended to transitional care unit once Patient has recovered well from surgery with renal function improving, pain well controlled with oral regimen, hemoglobin stable and safe disposition has been found.  Entered: Amara Doss MD 05/22/2020, 9:05 AM       The patient's care was discussed with the Bedside Nurse, Care Coordinator/ and Patient.    Amara Doss MD  Hospitalist Service  [facility]    ______________________________________________________________________    Interval History   Patient arrived back to the floor following surgical intervention at approximately 1900 last evening and since that time has done well overall.  His pain is still not well controlled with oral pain regimen, blood pressures have significantly improved following surgical intervention.  Patient is very talkative and is overall clear but at times is slightly confused.  Creatinine has increased this morning compared to prior to surgery.  Hemoglobin has decreased from 15.5 down to 12.9.  Patient is currently on room air and is voiding well following surgery with condom catheter in place.  No new nursing concerns.    Data reviewed today: I reviewed all  medications, new labs and imaging results over the last 24 hours.    Physical Exam   Vital Signs: Temp: 97  F (36.1  C) Temp src: Oral BP: 139/65 Pulse: 74 Heart Rate: 75 Resp: 20 SpO2: 93 % O2 Device: Nasal cannula Oxygen Delivery: 1 LPM  Weight: 188 lbs 0 oz  Constitutional: awake, alert, cooperative, tangential in conversation, no apparent distress, and appears stated age  Respiratory: No increased work of breathing, good air exchange, clear to auscultation bilaterally, no crackles or wheezing  Cardiovascular: Normal apical impulse, regular rate and rhythm, normal S1 and S2, and no murmur noted  GI: Bowel sounds present, abdomen soft and nontender  Musculoskeletal: Patient does have some edema in the left more than right lower leg with left leg elevated appropriately and no significant pitting edema is noted.  Neurologic: Awake, alert, oriented to person, place, year, and overall to situation but he perseverates about certain topics and is very tangential as well and at times is difficult to redirect back to the topic at hand.      Data   Recent Labs   Lab 05/22/20  0611 05/21/20  0523 05/20/20  1719   WBC  --   --  8.6   HGB 12.8* 15.5 14.4   MCV  --   --  88   PLT  --   --  178   INR  --   --  1.02    130* 130*   POTASSIUM 3.5 3.8 4.0   CHLORIDE 104 97 98   CO2 21 28 28   BUN 15 9 8   CR 1.42* 0.89 1.05   ANIONGAP 8 5 4   LAKISHA 7.3* 8.3* 8.0*   *  113* 120* 105*

## 2020-05-22 NOTE — PROGRESS NOTES
[facility]  Orthopedics Progress Note           Assessment and Plan:    Assessment:   Post-operative day #1 Procedure(s):  HEMIARTHROPLASTY, HIP, BIPOLAR   Elevated Creatinine         Plan:   Encourage IS  Start or continue physical therapy  Activity as tolerated with posterior hip precautions x 6 weeks  Weight-bear as tolerated.  Discharge pending on medical recommendations and disposition planning - anticipate 1-2 days  Pain control measures  Advance diet as tolerated  Routine wound care  DVT Prophylaxis: Aspirin , SCD's, Compression Hose  Hospitalist following and assisting in medical issues.   Elevated creatinine: medicine following, 500ml bolus was given            Interval History:   Doing well.  Continues to improve.  Pain is well-controlled with oral medication.  No fevers.  States some pain but tolerable.  Did work with physical therapy this morning and required heavy assist. They are recommending TCU at this point. 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  Has a codom style catheter draining.  Creatine was elevated this AM, medicine is assisting with this. He received 500ml bolus.            Review of Systems:    The patient denies any chest pain, shortness of breath, excessive pain, fever, chills, purulent drainage from the wound, nausea or vomiting.               Physical Exam:   General: awake, alert, appropriate and in no acute distress. Rambling speech.    Blood pressure 139/65, pulse 74, temperature 97  F (36.1  C), temperature source Oral, resp. rate 20, weight 85.3 kg (188 lb), SpO2 93 %.  Left lower extremity:  Aquacell style dressing clean, dry and intact. Surrounding skin intact, no breakdown. Calf is soft, nontender with no significant swelling. Distal neurovascular grossly intact.  Compartments soft and non-tender. Mild expected post-operative swelling to the thigh.   1-2+ bilateral pedal edema.   2+ distal pulse, sensation intact to foot, able to  df/pf against resistance. Brisk cap refill.            Data:     Results for orders placed or performed during the hospital encounter of 05/20/20 (from the past 24 hour(s))   XR Pelvis w Hip Port Left 1 View    Narrative    EXAM: XR PELVIS AND HIP PORTABLE LEFT 1 VIEW  LOCATION: [facility]  DATE/TIME: 5/21/2020 7:10 PM    INDICATION: Left hip arthroplasty.  COMPARISON: Yesterday.    FINDINGS: Proximal right femur hardware. Fairly prominent right hip joint space narrowing.      Impression    IMPRESSION: Left hip arthroplasty placement.   Hemoglobin   Result Value Ref Range    Hemoglobin 12.8 (L) 13.3 - 17.7 g/dL   Glucose   Result Value Ref Range    Glucose 113 (H) 70 - 99 mg/dL   Basic metabolic panel   Result Value Ref Range    Sodium 133 133 - 144 mmol/L    Potassium 3.5 3.4 - 5.3 mmol/L    Chloride 104 94 - 109 mmol/L    Carbon Dioxide 21 20 - 32 mmol/L    Anion Gap 8 3 - 14 mmol/L    Glucose 111 (H) 70 - 99 mg/dL    Urea Nitrogen 15 7 - 30 mg/dL    Creatinine 1.42 (H) 0.66 - 1.25 mg/dL    GFR Estimate 45 (L) >60 mL/min/[1.73_m2]    GFR Estimate If Black 52 (L) >60 mL/min/[1.73_m2]    Calcium 7.3 (L) 8.5 - 10.1 mg/dL     BHAVANA Garrett, CNP  Orthopedic Surgery

## 2020-05-22 NOTE — PLAN OF CARE
Pt arrived on the floor from PACU at 1840. VSS. CMS intact. Pt denies pain. Dressing CDI. Pt was able to speak with his wife on the phone. HS meds given and tolerating clear liquids. Pt sleeping comfortably.

## 2020-05-22 NOTE — PROGRESS NOTES
Berthar just received prior authorization approval from patient's [employer] Medicare Advantage insurance.     Patient has been accepted for TCU placement at [facility] in [place].  Writer will talk with patient and family regarding this option.  Admissions at [facility] stated that their facility has no COVID positive patients.  Patient would be in a single room, masked when staff are in room, masked if patient goes to therapy room.  Patient is in quarantine for 14 days after admission.  Facility is not accepting visitors.      1503- Berthar has spoken with Anay, per patient request.  Discussed that patient has been accepted for TCU placement at [facility] in [place].  Anay plans to talk with patient's wife, Estefania, and will have writer call Estefania if she requests this.  Patient and family in agreement with placement plan, wheelchair transport and the $160.00 private pay cost for transport.       has faxed the prior authorization approval paperwork to Bel at  [facility] TCU admissions.      Anticipate patient will be ready for discharge tomorrow or Sunday. TCU and family aware.

## 2020-05-22 NOTE — CONSULTS
CARE TRANSITION SOCIAL WORK INITIAL ASSESSMENT:          Met with: Patient and Family- wife, Estefania, and Anay castaneda, over the phone (per patient request).        DATA  Principal Problem:    Closed fracture of left hip (H)  Active Problems:    Tobacco use disorder    Hyponatremia    Femoral neck fracture (H)    Acute kidney injury (H)    Elevated blood pressure reading without diagnosis of hypertension       Primary Care Clinic Name: [facility]   Primary Care MD Name: Dr. Beaver   Contact information and PCP information verified: Yes      ASSESSMENT  Cognitive Status: awake, alert and oriented.                      Description of Support System: Supportive, Involved   Who is your support system?: Wife, Other (specify)(nuria-Anay Thompson)   Support Assessment: Adequate family and caregiver support, Adequate social supports, Lacks adequate physical care   Insurance Concerns: No Insurance issues identified  Living Arrangements: house    This writer met with patient and spoke with wifeEstefania, and Anay castaneda #[phone] over the phone and introduced self and role. Discussed discharge planning and medicare guidelines in regards to home care and SNF benefits. Pt/family was provided with the Medicare Compare list for SNF, discussed associated star ratings to assist with choice for referrals/discharge planning- Yes    Education was given to pt/family that star ratings are updated/maintained by Medicare and can be reviewed by visiting www.medicare.gov- Yes    Discussed current recommendation for TCU placement after the hospital stay.  Patient requested that I talk with wife and Anay about this more.  Anay had a lot of really good questions about TCU placement and area options.  Anay called other family members to inquire if family could help provide 24/7 care for patient at home.  Anay stated that at this time, family is feeling they will not be able to assist  with 24/7 care and Anay stated that patient, wife, and family in agreement with looking into area TCU options. Anay also inquired about the facilities current COVID status, so that is to be discussed also.     Discussed that patient could be ready for discharge as soon as tomorrow or Sunday.  Also discussed that writer has submitted information to patient's [facility] Medicare Advantage insurance for prior authorization for TCU placement.  Awaiting their response.      Patient was provided with Medicare certified nursing home list. Pts choices are as follows:     [facility] (Main Phone: [phone] Admissions Phone: [phone] Fax: [phone])- unable to accommodate an admission today through Monday, could assess on Tuesday.  [facility] (Admissions: [phone] Main Phone: [phone] Fax: [phone])- unable to accommodate an admission today through Monday, could assess on Tuesday.     [facility] (Admissions: [phone] Main Phone: [phone] Fax: [phone])- are assessing, but do not anticipate any beds opening until Tuesday.      [facility] (Admissions: [phone], Main Phone: [phone] Fax: [phone])- are assessing.      [facility] (Admissions phone: [phone] Main Phone: [phone] Fax: [phone])- will assess.      Also discussed that patient may not be able to get in/out of a family vehicle.  Discussed option of wheelchair van transport and the private pay cost for this.  Anay stated understanding off this.          PLAN    Looking for TCU placement.  Care Transitions will continue to assist with discharge planning options.         GARCÍA Lipscomb  [facility]   [phone]

## 2020-05-22 NOTE — PLAN OF CARE
VSS on 1LPM.  Afebrile.  EtCO2 remained mid 20s much of the night, pt alert and awake for most of the night, lightly sleeping in between cares and repositioning.   Encouraging coughing and deep breathing, Incentive spirometer use.  Pt demonstrated fair technique, but reaching only 750 on IS.  Pt reports pain controlled on scheduled tylenol, tolerating turning and repositioning.  CMS intact, BLE slightly edematous. Pt had condom cath come off in phase II recovery on med surg and saturated abductor immobilizer and sheets.  Linens changed and [place] care provided.  Pillow placed in lieu of wedge.  Condom cath replaced and pt voiding clear, yellow urine.  Pt tolerating regular diet.  Denies nausea.  Will continue to monitor.

## 2020-05-22 NOTE — PROGRESS NOTES
05/22/20 0830   Quick Adds   Type of Visit Initial PT Evaluation   Living Environment   Lives With spouse   Living Arrangements house   Home Accessibility stairs to enter home;stairs within home   Number of Stairs, Main Entrance 4   Stair Railings, Main Entrance railings on both sides of stairs  (ramp door in back for wheelchair)   Transportation Anticipated family or friend will provide;agency  (Cheyene, g.g.g.niece,  may be able to help)   Living Environment Comment wife has Parkinson's, drives if she has to   Self-Care   Usual Activity Tolerance moderate   Current Activity Tolerance fair   Regular Exercise No   Equipment Currently Used at Home cane, straight;cane, quad;walker, rolling  (2WW used in the morning to get out of bed)   Functional Level Prior   Ambulation 0-->independent   Transferring 0-->independent   Toileting 0-->independent   Bathing 0-->independent   Communication 0-->understands/communicates without difficulty   Swallowing 0-->swallows foods/liquids without difficulty   Cognition 0 - no cognition issues reported   Fall history within last six months yes   Number of times patient has fallen within last six months 1   Which of the above functional risks had a recent onset or change? none   General Information   Onset of Illness/Injury or Date of Surgery - Date 05/20/20   Referring Physician Dr Rose   Pertinent History of Current Problem (include personal factors and/or comorbidities that impact the POC) 5/20/20 tripped on uneven ground going outside/garage.   Weight-Bearing Status - LLE weight-bearing as tolerated   General Observations 91% RA at rest to begin.  Up to 93% on 1L NC within 1 minute.  Remained at 93% during and end of session with 1L NC flow O2.    Cognitive Status Examination   Orientation orientation to person, place and time   Level of Consciousness alert   Follows Commands and Answers Questions 100% of the time;able to follow single-step instructions   Personal Safety and  Judgment intact   Memory intact   Pain Assessment   Patient Currently in Pain No   Range of Motion (ROM)   ROM Comment WFL outside of restrictions   Strength   Strength Comments weak for antigravity to start but can elicit fair hip flex and knee ext and flex.    Bed Mobility   Bed Mobility Comments max A and use of bed rails for turning and scooting.   Transfer Skills   Transfer Transfer Skill: Sit to Stand;Transfer Skill:  Stand to Sit   Transfer Skill:  Sit to Stand   Level of [place]: Sit/Stand maximum assist (25% patients effort)   Physical Assist/Nonphysical Assist: Sit/Stand 2 persons   Weightbearing Restrictions: Sit/Stand weight-bearing as tolerated   Assistive Device for Transfer: Sit/Stand rolling walker   Transfer Skill: Stand to Sit   Level of [place]: Stand/Sit maximum assist (25% patients effort)   Physical Assist/Nonphysical Assist: Stand/Sit 1 person assist   Weight-Bearing Restrictions: Stand/Sit weight-bearing as tolerated   Assistive Device: Stand to Sit rolling walker   Gait   Gait Comments unable to safely take steps this first time up today as he fatigued   Balance   Balance Comments posterior lean needing max A to stand with walker, but does start to gain some balance reaction with cues in training.   General Therapy Interventions   Planned Therapy Interventions balance training;gait training;neuromuscular re-education;strengthening;transfer training   Clinical Impression   Criteria for Skilled Therapeutic Intervention yes, treatment indicated   PT Diagnosis mm weakness, gait and balance impairments   Influenced by the following impairments balance,  mm disuse from recent surgery, needing training on mobility s/p surgery   Functional limitations due to impairments gait, standing, bed mobility, transfers   Clinical Presentation Stable/Uncomplicated   Clinical Decision Making (Complexity) Low complexity   Therapy Frequency 2x/day   Predicted Duration of Therapy Intervention (days/wks)  "for 5 days/wk and daily x 2 days/week   Anticipated Equipment Needs at Discharge   (defer to TCU)   Anticipated Discharge Disposition Transitional Care Facility   Risk & Benefits of therapy have been explained Yes   Patient, Family & other staff in agreement with plan of care Yes   Clinical Impression Comments 85 y.o. male who tries very hard with all mobilty and ex. He fatigues easily at this time and needs help in new mobility and transfer training with new precautions and s/p surgical deconditioning affects.   [facility]-PAC TM \"6 Clicks\"   2016, Trustees of [facility], under license to [employer].  All rights reserved.   6 Clicks Short Forms Basic Mobility Inpatient Short Form   [facility] AM-PAC  \"6 Clicks\" V.2 Basic Mobility Inpatient Short Form   1. Turning from your back to your side while in a flat bed without using bedrails? 2 - A Lot   2. Moving from lying on your back to sitting on the side of a flat bed without using bedrails? 2 - A Lot   3. Moving to and from a bed to a chair (including a wheelchair)? 2 - A Lot   4. Standing up from a chair using your arms (e.g., wheelchair, or bedside chair)? 2 - A Lot   5. To walk in hospital room? 1 - Total   6. Climbing 3-5 steps with a railing? 1 - Total   Basic Mobility Raw Score (Score out of 24.Lower scores equate to lower levels of function) 10   Total Evaluation Time   Total Evaluation Time (Minutes) 30     "

## 2020-05-23 ENCOUNTER — APPOINTMENT (OUTPATIENT)
Dept: PHYSICAL THERAPY | Facility: CLINIC | Age: 85
DRG: 470 | End: 2020-05-23
Payer: COMMERCIAL

## 2020-05-23 VITALS
WEIGHT: 188 LBS | SYSTOLIC BLOOD PRESSURE: 165 MMHG | RESPIRATION RATE: 18 BRPM | HEART RATE: 85 BPM | OXYGEN SATURATION: 92 % | BODY MASS INDEX: 27.36 KG/M2 | TEMPERATURE: 96.7 F | DIASTOLIC BLOOD PRESSURE: 93 MMHG

## 2020-05-23 LAB
ANION GAP SERPL CALCULATED.3IONS-SCNC: 8 MMOL/L (ref 3–14)
BUN SERPL-MCNC: 16 MG/DL (ref 7–30)
CALCIUM SERPL-MCNC: 7.5 MG/DL (ref 8.5–10.1)
CHLORIDE SERPL-SCNC: 103 MMOL/L (ref 94–109)
CO2 SERPL-SCNC: 20 MMOL/L (ref 20–32)
CREAT SERPL-MCNC: 0.99 MG/DL (ref 0.66–1.25)
GFR SERPL CREATININE-BSD FRML MDRD: 69 ML/MIN/{1.73_M2}
GLUCOSE SERPL-MCNC: 106 MG/DL (ref 70–99)
HGB BLD-MCNC: 12.6 G/DL (ref 13.3–17.7)
POTASSIUM SERPL-SCNC: 3.3 MMOL/L (ref 3.4–5.3)
SODIUM SERPL-SCNC: 131 MMOL/L (ref 133–144)

## 2020-05-23 PROCEDURE — 25000132 ZZH RX MED GY IP 250 OP 250 PS 637: Performed by: FAMILY MEDICINE

## 2020-05-23 PROCEDURE — 85018 HEMOGLOBIN: CPT | Performed by: ORTHOPAEDIC SURGERY

## 2020-05-23 PROCEDURE — 97530 THERAPEUTIC ACTIVITIES: CPT | Mod: GP | Performed by: PHYSICAL THERAPIST

## 2020-05-23 PROCEDURE — 80048 BASIC METABOLIC PNL TOTAL CA: CPT | Performed by: ORTHOPAEDIC SURGERY

## 2020-05-23 PROCEDURE — 36415 COLL VENOUS BLD VENIPUNCTURE: CPT | Performed by: ORTHOPAEDIC SURGERY

## 2020-05-23 PROCEDURE — 99233 SBSQ HOSP IP/OBS HIGH 50: CPT | Performed by: FAMILY MEDICINE

## 2020-05-23 PROCEDURE — 97110 THERAPEUTIC EXERCISES: CPT | Mod: GP | Performed by: PHYSICAL THERAPIST

## 2020-05-23 PROCEDURE — 25000132 ZZH RX MED GY IP 250 OP 250 PS 637: Performed by: ORTHOPAEDIC SURGERY

## 2020-05-23 PROCEDURE — 99207 ZZC CDG-MDM COMPONENT: MEETS MODERATE - UP CODED: CPT | Performed by: FAMILY MEDICINE

## 2020-05-23 RX ORDER — POLYETHYLENE GLYCOL 3350 17 G/17G
17 POWDER, FOR SOLUTION ORAL DAILY PRN
Qty: 45 PACKET | Refills: 1 | Status: ON HOLD | DISCHARGE
Start: 2020-05-23 | End: 2020-06-09

## 2020-05-23 RX ORDER — POTASSIUM CHLORIDE 7.45 MG/ML
10 INJECTION INTRAVENOUS
Status: DISCONTINUED | OUTPATIENT
Start: 2020-05-23 | End: 2020-05-23 | Stop reason: HOSPADM

## 2020-05-23 RX ORDER — ALBUTEROL SULFATE 0.83 MG/ML
2.5 SOLUTION RESPIRATORY (INHALATION) EVERY 4 HOURS PRN
Status: ON HOLD | DISCHARGE
Start: 2020-05-23 | End: 2020-06-09

## 2020-05-23 RX ORDER — MULTIPLE VITAMINS W/ MINERALS TAB 9MG-400MCG
1 TAB ORAL DAILY
Status: ON HOLD | DISCHARGE
Start: 2020-05-23 | End: 2020-06-09

## 2020-05-23 RX ORDER — NICOTINE 21 MG/24HR
1 PATCH, TRANSDERMAL 24 HOURS TRANSDERMAL DAILY
Status: ON HOLD | DISCHARGE
Start: 2020-05-23 | End: 2020-06-09

## 2020-05-23 RX ORDER — AMOXICILLIN 250 MG
2 CAPSULE ORAL 2 TIMES DAILY PRN
Qty: 45 TABLET | Refills: 1 | Status: ON HOLD | DISCHARGE
Start: 2020-05-23 | End: 2020-06-09

## 2020-05-23 RX ORDER — ONDANSETRON 4 MG/1
4 TABLET, ORALLY DISINTEGRATING ORAL EVERY 8 HOURS PRN
Qty: 10 TABLET | Refills: 1 | Status: ON HOLD | DISCHARGE
Start: 2020-05-23 | End: 2020-06-09

## 2020-05-23 RX ORDER — POTASSIUM CHLORIDE 1500 MG/1
20-40 TABLET, EXTENDED RELEASE ORAL
Status: DISCONTINUED | OUTPATIENT
Start: 2020-05-23 | End: 2020-05-23 | Stop reason: HOSPADM

## 2020-05-23 RX ORDER — OXYCODONE HYDROCHLORIDE 5 MG/1
5 TABLET ORAL
Qty: 30 TABLET | Refills: 0 | Status: ON HOLD | OUTPATIENT
Start: 2020-05-23 | End: 2020-06-09

## 2020-05-23 RX ORDER — POTASSIUM CL/LIDO/0.9 % NACL 10MEQ/0.1L
10 INTRAVENOUS SOLUTION, PIGGYBACK (ML) INTRAVENOUS
Status: DISCONTINUED | OUTPATIENT
Start: 2020-05-23 | End: 2020-05-23 | Stop reason: HOSPADM

## 2020-05-23 RX ORDER — BUSPIRONE HYDROCHLORIDE 5 MG/1
5 TABLET ORAL 2 TIMES DAILY
Qty: 180 TABLET | Refills: 1 | Status: ON HOLD | DISCHARGE
Start: 2020-05-23 | End: 2020-06-09

## 2020-05-23 RX ORDER — POTASSIUM CHLORIDE 29.8 MG/ML
20 INJECTION INTRAVENOUS
Status: DISCONTINUED | OUTPATIENT
Start: 2020-05-23 | End: 2020-05-23

## 2020-05-23 RX ORDER — ACETAMINOPHEN 325 MG/1
975 TABLET ORAL EVERY 8 HOURS PRN
Qty: 100 TABLET | Refills: 0 | Status: ON HOLD | DISCHARGE
Start: 2020-05-23 | End: 2020-06-09

## 2020-05-23 RX ORDER — POTASSIUM CHLORIDE 1.5 G/1.58G
20-40 POWDER, FOR SOLUTION ORAL
Status: DISCONTINUED | OUTPATIENT
Start: 2020-05-23 | End: 2020-05-23 | Stop reason: HOSPADM

## 2020-05-23 RX ORDER — FLUTICASONE PROPIONATE 50 MCG
1-2 SPRAY, SUSPENSION (ML) NASAL DAILY
DISCHARGE
Start: 2020-05-23 | End: 2020-06-05

## 2020-05-23 RX ORDER — TIMOLOL MALEATE 5 MG/ML
1 SOLUTION/ DROPS OPHTHALMIC 2 TIMES DAILY
Status: ON HOLD | DISCHARGE
Start: 2020-05-23 | End: 2020-06-09

## 2020-05-23 RX ORDER — ASPIRIN 325 MG
325 TABLET, DELAYED RELEASE (ENTERIC COATED) ORAL 2 TIMES DAILY WITH MEALS
Qty: 84 TABLET | Refills: 0 | Status: ON HOLD | DISCHARGE
Start: 2020-05-23 | End: 2020-06-09

## 2020-05-23 RX ADMIN — TIMOLOL MALEATE 1 DROP: 5 SOLUTION/ DROPS OPHTHALMIC at 09:20

## 2020-05-23 RX ADMIN — Medication 1 MG: at 00:04

## 2020-05-23 RX ADMIN — ACETAMINOPHEN 975 MG: 325 TABLET, FILM COATED ORAL at 04:12

## 2020-05-23 RX ADMIN — SENNOSIDES AND DOCUSATE SODIUM 2 TABLET: 8.6; 5 TABLET ORAL at 09:20

## 2020-05-23 RX ADMIN — OXYCODONE HYDROCHLORIDE 5 MG: 5 TABLET ORAL at 06:18

## 2020-05-23 RX ADMIN — OMEPRAZOLE 20 MG: 20 CAPSULE, DELAYED RELEASE ORAL at 09:19

## 2020-05-23 RX ADMIN — POLYETHYLENE GLYCOL 3350 17 G: 17 POWDER, FOR SOLUTION ORAL at 09:20

## 2020-05-23 RX ADMIN — POTASSIUM CHLORIDE 40 MEQ: 1500 TABLET, EXTENDED RELEASE ORAL at 09:19

## 2020-05-23 RX ADMIN — BUSPIRONE HYDROCHLORIDE 5 MG: 5 TABLET ORAL at 09:19

## 2020-05-23 RX ADMIN — ASPIRIN 325 MG: 325 TABLET, COATED ORAL at 09:19

## 2020-05-23 ASSESSMENT — ACTIVITIES OF DAILY LIVING (ADL)
ADLS_ACUITY_SCORE: 16

## 2020-05-23 NOTE — PROGRESS NOTES
[facility]    Medicine Progress Note - Hospitalist Service       Date of Admission:  5/20/2020  Assessment & Plan   Patient was admitted on 5/20/2020 after a fall at home when he tripped on uneven ground.  He has been found to have a left femoral neck fracture with angulation that has undergone surgical repair on 5/21/2020 and is postop day #2.  Patient's pain is well controlled, he is alert, not requiring oxygen.  He did have an acute kidney injury following surgical intervention with creatinine increasing from 0.89 up to 1.42 but has improved back to 0.9 following fluid resuscitation and he is now eating and drinking well.  Hemoglobin has stabilized in the mid 12 range.  Blood pressures have fluctuated during this hospitalization, with HTN more notable if pain gets out of control or when patient is more stressed/tense but when pain well controlled an patient calm, systolic is in the 120-140 range.      Principal Problem:    Closed fracture of left hip; Femoral neck fracture (H)     Assessment: Postoperative day #2 following left hip hemiarthroplasty.  Hemoglobin decreased from 15.5 down to 12.6 following surgery and is stable with patient hemodynamically stable and pain well controlled with oral medications.      Plan: ongoing management per orthopedic surgery team.  Anticipate discharge to TCU today    Active Problems:    Acute kidney injury (H)    Assessment: Developing in the hours after surgery, possibly secondary to surgical intervention, volume status and decreased oral intake.  Has recovered with fluid resuscitation and creatinine is now back to previous baseline    Plan: Will saline lock IV fluids.  Would recommend rechecking creatinine in 5-7 days after discharge to ensure stability      Elevated blood pressure reading without diagnosis of hypertension    Assessment: Noted on initial presentation and thought secondary to pain with blood pressure is significantly improving  after surgical intervention - mainly in the 120-140 range systolic over the past 24 hours with an exception of one reading in the 180 systolic range which resolved without intervention.  Currently on exam, blood pressure jumped almost 20 points systolic when patient was getting upset over having the wrong food for breakfast delivered.      Plan: Would recommend ongoing monitoring at the TCU to ensure overall stability.  Given his age and current medical status, blood pressure goal would be less than 160 systolic overall.  Patient may need oral anti-hypertensive agents going forward if persistent elevation occurs.       Hypokalemia    Assessment:  Not present initially but slightly low at 3.3 following fluid resuscitation with normal saline.    Plan:  Will provide replacement this morning before discharge and would recommend rechecking within 5-7 days to ensure ongoing stability following discharge       Hyponatremia    Assessment: Chronic and stable in the 130-131 range.  Patient is not on any salt tablets or fluid restriction at baseline and states he just salts his food well and thinks he is pretty stable.    Plan: Continue to monitor for stability following discharge       Tobacco use disorder    Assessment: Patient smokes 1 pack/day, currently on nicotine replacement and denies any withdrawal symptoms    Plan: Continue with nicotine replacement at time of transfer to TCU         Diet: Advance Diet as Tolerated: Regular Diet Adult    DVT Prophylaxis: Pneumatic Compression Devices  Streeter Catheter: not present  Code Status: Full Code           Disposition Plan   Expected discharge: Today as long as cleared by orthopedic team, recommended to transitional care unit once cleared by orthopedic team.  Entered: Amara Doss MD 05/23/2020, 6:59 AM       The patient's care was discussed with the Bedside Nurse, Care Coordinator/ and Patient.    Amara Doss MD  Hospitalist  Service  [facility]    ______________________________________________________________________    Interval History   Patient remains afebrile, off O2 supplementation, eating and drinking well.  One elevated blood pressure last evening but improved without intervention.  Has had some increased confusion intermittently which is worse in the evening hours and overnight but re-orients well.  Pain well controlled.  Renal function improved.      Data reviewed today: I reviewed all medications, new labs and imaging results over the last 24 hours.    Physical Exam   Vital Signs: Temp: 96.7  F (35.9  C) Temp src: Oral BP: (!) 146/67 Pulse: 82 Heart Rate: 76 Resp: 16 SpO2: 92 % O2 Device: None (Room air) Oxygen Delivery: 1 LPM  Weight: 188 lbs 0 oz  Constitutional: awake, alert, cooperative, no apparent distress, and appears stated age - patient does get easily frustrated and worked up but calms quickly with reassurance.    Respiratory: No increased work of breathing, good air exchange, clear to auscultation bilaterally, no crackles or wheezing  Cardiovascular: Normal apical impulse, regular rate and rhythm, normal S1 and S2, no S3 or S4, and no murmur noted  GI: bowel sounds present, abdomen soft and non-tender  Musculoskeletal: no lower extremity pitting edema present  Neurologic: Awake, alert, oriented to name, place and time.  He is aware that he is not as mentally sharp as normal and is frustrated by this.      Data   Recent Labs   Lab 05/23/20  0516 05/22/20  1404 05/22/20  0611 05/21/20  0523 05/20/20  1719   WBC  --   --   --   --  8.6   HGB 12.6*  --  12.8* 15.5 14.4   MCV  --   --   --   --  88   PLT  --   --   --   --  178   INR  --   --   --   --  1.02   *  --  133 130* 130*   POTASSIUM 3.3*  --  3.5 3.8 4.0   CHLORIDE 103  --  104 97 98   CO2 20  --  21 28 28   BUN 16  --  15 9 8   CR 0.99 1.12 1.42* 0.89 1.05   ANIONGAP 8  --  8 5 4   LAKISHA 7.5*  --  7.3* 8.3* 8.0*   *   --  111*  113* 120* 105*

## 2020-05-23 NOTE — DISCHARGE INSTRUCTIONS
Partial Hip Replacement Discharge Instructions                                      [phone]  Bone and Joint Service Line for issues or concerns    General Care:  After surgery you may feel tired/sleepy. This is normal. If you have any question along the way please contact the office. If you feel it is an issue cannot wait for normal office hours, contact the on-call physician. You should not drive or operate machines/equipment until released by your physician to do so.     Bandages:    It s normal to have some blood-tinged fluid on your bandages, this may occur for a few days after being sent home from the hospital. Keep incision covered with hospital dressing (Aquacel) for one week. It is okay to shower with this dressing on. However, do not submerge your dressing and incision in water for roughly 2 weeks. After one week remove this dressing and then keep it clean, dry, and covered. If this dressing become looses or falls off it is ok to cover with gauze and tape.  Wash the incision gently with warm soapy water after removing your hospital dressing and lightly pat dry.      Swelling (edema) control:  Preventing swelling (edema) in your legs after surgery is very important. It is helpful for achieving optimal range of motion as well as preventing blood clots.  It starts with simple things such as elevation of your legs and icing. Elevate your legs above your heart.    Do this for 20 minutes every couple hours the first few days after surgery. We also recommend BLAKE hose (compression hose) to be worn. Wear on both legs during the day. You may remove at night. Wear these until directed to stop.    Bathing:  Do not submerge your incision in water such as a bath or pool. It is ok to shower when you get home but keep your incision covered with a sealed bandage. You may find in comfortable to get a shower chair for the first month while you continue to heal and get stronger.     Follow up:  Your follow up appointment  should already be scheduled. If it s not, please call the office to verify an appointment 14 days after surgery with Dr. Pola Rose, [facility]. If there are no issues in your recovery you will have an appointment at 14 days, 6 weeks, 3 months, 6 months, and 1 year from your surgery date.     Diet:  You may not have a full appetite at discharge this should get better. Progress to bland foods such as crackers and bread and finally to your normal diet if you have no problems.  Avoid alcohol when taking narcotic pain medications.      Pain control:  Take your pain medications as prescribed. These medications may make you sleepy. Do not drive, operate equipment, or drink alcohol when taking these.  You may take Tylenol (Generic name is acetaminophen) as directed on the bottle for additional relief or in place of the prescribed pain medications as your pain gets better.  If the medications cause a reaction such as nausea or skin rash, stop taking them and contact your doctor. Please plan accordingly, pain medications will not be re-filled on the weekends or at night. Call the office during the day if you need more medications.    Blood thinner:  It is very important to take the prescribed medication as directed to prevent blood clots. No medication is perfect, so if you notice a sudden onset of pain/swelling in your calf area call your doctor. If you notice a sudden onset of troubles breathing and/or chest pain call 911.     Icing:  It is common for some swelling, aching and stiffness to occur for awhile after a hip replacement.  Apply for 20 minutes at a time. For the first 1-2 weeks apply ice 2-3 x a day or more after therapy.    Walker/crutches:  Use a walker/crutches when you go home. You will transition to the use of a cane and finally to no additional support.     Physical Therapy:  The success of your hip replacement is based on doing physical therapy. You will have some pain and discomfort along the  way. If you feel your pain is limiting your progress make sure to take some pain medication prior to your therapy session. If your pain medications is not working, talk to your surgeon.   For the first 1-2 weeks after going home you will have in-home physical therapy. The goal is to work on walking and feeling steady.  Usually after your first post-operative follow up you will move to out-patient physical therapy.     Activity:  Unless otherwise instructed, you can weight bear as tolerated with a walker/cane. For 6 weeks follow these listed precautions:  Do not bend the operated hip past 90 degrees (for example sitting in a low couch or toilet). Use a raised seat on your toilet for 6-8 weeks. If you find yourself in a low seat, keep your legs apart (don t let the knees touch) and kneecaps facing forward when getting up. Please ask for help.    Do not cross the midline of your body with the operated leg.  Do not rotate the operated leg inward, your toes and kneecap should point toward the ceiling when in bed.       Normal findings after surgery:  Numbness and tenderness around the incisions.   You may have bruising around the incisions and down the thigh.   Low grade fevers less than 100.5 degrees Fahrenheit are normal.   You will have some increased pain after your therapy sessions.     When to call the Office:  Temperature greater than 101.5 degrees Fahreheit.  Fever, chills, and increasing pain in the hip.  Excessive drainage from the incisions that include bright red blood.  Drainage from the incisions sites that appear yellow, pus-like, or foul smelling.  Increasing pain the hip not relieved by the prescribed pain medications or ice.  Persistent nausea or vomiting   Increased pain or swelling in your calf area (in back above your ankle) that wasn t there when in the hospital.  Any other effects you feel are significant.  Call 911 if you experience any chest pain and/or shortness or breath.        A follow-up  appointment will be made for you with Dr. Rose in about 2 weeks.  You or [facility]CU in [place] [facility] staff can call [phone] to check on the appointment if you do not hear from them.

## 2020-05-23 NOTE — PROGRESS NOTES
[facility]  Orthopedics Progress Note           Assessment and Plan:    Assessment:   Post-operative day #2 Procedure(s):  HEMIARTHROPLASTY, HIP, BIPOLAR         Plan:   Encourage IS  Start or continue physical therapy  Activity as tolerated with posterior hip precautions x 6 weeks  Weight-bear as tolerated.  Discharge today  Pain control measures  Advance diet as tolerated  Routine wound care  DVT Prophylaxis: Aspirin , SCD's, Compression Hose  Hospitalist following and assisting in medical issues.   Creatinine WDL this AM.             Interval History:   Doing well.  Continues to improve.  Pain is well-controlled with oral medication.  No fevers.  States some pain but tolerable. Creatinine was WDL today. Medicine has cleared patient for discharge from their perspective. Has not worked with physical therapy yet, was a Ax2 from bed to chair. Voiding with condom catheter in place. Did have some confusion last evening but this cleared without intervention. No confusion today. Has not had any agitation or attempts to get out of bed/chair during this stay. No nursing concerns.  Has been admitted to TCU. Will discharge this AM.  Did update his niece yesterday and left message today. Per patient she is the preferred contact as she is very involved with his care.             Review of Systems:    The patient denies any chest pain, shortness of breath, excessive pain, fever, chills, purulent drainage from the wound, nausea or vomiting.               Physical Exam:   General: awake, alert, appropriate and in no acute distress. Rambling speech.    Blood pressure (!) 165/93, pulse 85, temperature 96.7  F (35.9  C), temperature source Oral, resp. rate 18, weight 85.3 kg (188 lb), SpO2 92 %.  Left lower extremity:  Aquacell style dressing clean, dry and intact. Surrounding skin intact, no breakdown. 2 very small superficial skin tears just proximal to the dressing. Covered with Tegaderm. Calf is soft, nontender  with no significant swelling. Distal neurovascular grossly intact.  Compartments soft and non-tender. Mild expected post-operative swelling to the thigh.   1-2+ bilateral pedal edema.   2+ distal pulse, sensation intact to foot, able to df/pf against resistance. Brisk cap refill.            Data:     Results for orders placed or performed during the hospital encounter of 05/20/20 (from the past 24 hour(s))   Care Transition RN/SW IP Consult    Narrative    Nika Vidales     5/22/2020 12:26 PM  CARE TRANSITION SOCIAL WORK INITIAL ASSESSMENT:          Met with: Patient and Family- wife, Estefania, and Anay castaneda, over the phone (per patient request).        DATA  Principal Problem:    Closed fracture of left hip (H)  Active Problems:    Tobacco use disorder    Hyponatremia    Femoral neck fracture (H)    Acute kidney injury (H)    Elevated blood pressure reading without diagnosis of   hypertension       Primary Care Clinic Name: [facility]   Primary Care MD Name: Dr. Beaver   Contact information and PCP information verified: Yes      ASSESSMENT  Cognitive Status: awake, alert and oriented.                      Description of Support System: Supportive, Involved   Who is your support system?: Wife, Other (specify)(nuria-Anay Thompson)   Support Assessment: Adequate family and caregiver support,   Adequate social supports, Lacks adequate physical care   Insurance Concerns: No Insurance issues identified  Living Arrangements: house    This writer met with patient and spoke with wife, Estefania, and Anay castaneda #[phone] over the phone and   introduced self and role. Discussed discharge planning and   medicare guidelines in regards to home care and SNF benefits.   Pt/family was provided with the Medicare Compare list for SNF,   discussed associated star ratings to assist with choice for   referrals/discharge planning- Yes    Education was given to pt/family that  star ratings are   updated/maintained by Medicare and can be reviewed by visiting   www.medicare.gov- Yes    Discussed current recommendation for TCU placement after the   hospital stay.  Patient requested that I talk with wife and Anay   about this more.  Anay had a lot of really good questions about   TCU placement and area options.  Anay called other family   members to inquire if family could help provide 24/7 care for   patient at home.  Anay stated that at this time, family is   feeling they will not be able to assist with 24/7 care and Anay   stated that patient, wife, and family in agreement with looking   into area TCU options. Anay also inquired about the facilities   current COVID status, so that is to be discussed also.     Discussed that patient could be ready for discharge as soon as   tomorrow or Sunday.  Also discussed that writer has submitted   information to patient's [facility] Medicare Advantage insurance for   prior authorization for TCU placement.  Awaiting their response.        Patient was provided with Medicare certified nursing home list.   Pts choices are as follows:     [facility] (Main Phone: [phone] Admissions   Phone: [phone] Fax: [phone])- unable to accommodate an   admission today through Monday, could assess on Tuesday.  [facility] (Admissions: [phone] Main Phone: [phone] Fax:   [phone])- unable to accommodate an admission today through   Monday, could assess on Tuesday.     [facility] (Admissions: [phone] Main Phone:   [phone] Fax: [phone])- are assessing, but do not   anticipate any beds opening until Tuesday.      [facility] (Admissions:   [phone], Main Phone: [phone] Fax: [phone])- are   assessing.      [facility] (Admissions phone:   [phone] Main Phone: [phone] Fax: [phone])- will   assess.      Also  discussed that patient may not be able to get in/out of a   family vehicle.  Discussed option of wheelchair van transport and   the private pay cost for this.  Anay stated understanding off   this.          PLAN    Looking for TCU placement.  Care Transitions will continue to   assist with discharge planning options.         GARCÍA Lipscomb  [facility]   [phone]         Creatinine   Result Value Ref Range    Creatinine 1.12 0.66 - 1.25 mg/dL    GFR Estimate 59 (L) >60 mL/min/[1.73_m2]    GFR Estimate If Black 69 >60 mL/min/[1.73_m2]   Asymptomatic COVID-19 Virus (Coronavirus) by PCR    Specimen: Nasopharyngeal   Result Value Ref Range    COVID-19 Virus PCR to [facility] - Source Nasopharyngeal     COVID-19 Virus PCR to [facility] - Result       Test received-See reflex to IDDL test SARS CoV2 (COVID-19) Virus RT-PCR   SARS-CoV-2 COVID-19 Virus (Coronavirus) RT-PCR Nasopharyngeal    Specimen: Nasopharyngeal   Result Value Ref Range    SARS-CoV-2 Virus Specimen Source Nasopharyngeal     SARS-CoV-2 PCR Result NEGATIVE     SARS-CoV-2 PCR Comment       This automated, real-time RT-PCR assay by [employer] on the [employer] has   been given Emergency Use Authorization (EUA) for the in vitro qualitative detection of RNA   from the SARS-CoV2 virus in nasopharyngeal swabs in viral transport medium from patients   with signs and symptoms of infection who are suspected of COVID-19. The performance is   unknown in asymptomatic patients.     Hemoglobin   Result Value Ref Range    Hemoglobin 12.6 (L) 13.3 - 17.7 g/dL   Basic metabolic panel   Result Value Ref Range    Sodium 131 (L) 133 - 144 mmol/L    Potassium 3.3 (L) 3.4 - 5.3 mmol/L    Chloride 103 94 - 109 mmol/L    Carbon Dioxide 20 20 - 32 mmol/L    Anion Gap 8 3 - 14 mmol/L    Glucose 106 (H) 70 - 99 mg/dL    Urea Nitrogen 16 7 - 30 mg/dL    Creatinine 0.99 0.66 - 1.25 mg/dL    GFR Estimate 69 >60 mL/min/[1.73_m2]    GFR Estimate If Black 80  >60 mL/min/[1.73_m2]    Calcium 7.5 (L) 8.5 - 10.1 mg/dL     BHAVANA Garrett, CNP  Orthopedic Surgery

## 2020-05-23 NOTE — PROGRESS NOTES
S End of shift report    B. Left femoral fracture    A. /67, HR 76, Afebrile , RA. Patient is alert and oriented, Dressing is CDI. Pain controlled.     R. Will continue to monitor per POC

## 2020-05-23 NOTE — PROGRESS NOTES
Jerry Rodriguez  Gender: male  : 1935  [address]  [facility] [facility] [id number] [phone] (home)     Medical Record: [id number]  Pharmacy: [facility]  - [place], [place] - [address] [facility]  Primary Care Provider: Roberta Beaver    Parent's names are: Data Unavailable (mother) and Data Unavailable (father).      [facility]  May 23, 2020     Discharge Phone Call:  Key Words/Key Times      Discharged to [facility] with handivan transport.

## 2020-05-23 NOTE — PROGRESS NOTES
"Name: Jerry Rodriguez    Admit Date and Time: 5/20/2020  4:50 PM    MRN#: [id number]    Reason for Hospitalization: Closed fracture of neck of left femur, initial encounter (H) [S72.002A]    Discharge Date: 5/23/2020    Patient / Family response to discharge plan: Patient and family in agreement with patient discharging to the TCU at [facility].      Writer visited with patient regarding plan, transport, and discussed that [facility] is a non smoking facility.  Patient stated, \"I am quitting anyway\".     Writer spoke with Anay on the phone. She stated that her brother, Randall, was going to bring a bag of clothes and pay the [employer]  when they arrive at [facility].  Anay thanked writer for the assistance with discharge planning.      PAS-RR    Per DHS regulation, CTS team completed and submitted PAS-RR to [place] Board on Aging Direct Connect via the Senior LinkAge Line. CTS team advised SNF and they are aware a PAS-RR has been submitted.     CTS team reviewed with pt or health care agent that they may be contacted for a follow up appointment within 10 days of hospital discharge if SNF stay is <30 days. Contact information for [facility] LinkAge Line was also provided.     Pt or health care agent verbalized understanding.     COVID-19 testing requested by facility prior to accepting patient for admission- Yes. They requested a second test, but could admit patient with the result pending.     Discharging provider advised -yes    [facility]'s [facility].  Provided the name of the TCU facility patient discharged to- [facility] in [place].      PAS-RR # [id number]    Other Providers (Care Coordinator, County Services, PCA services etc): No    CTS Hand Off Completed: Yes: going to TCU     DARREN Score: Average     Future Appointments: No future appointments.    Discharge Disposition: SNF    Discharge Planner   Discharge Plans in progress: Completed. discharge to TCU " at [facility]  Barriers to discharge plan: None   Follow up plan: Per TCU, Ortho        Entered by: VIDAL SUTHERLAND 05/23/2020 2:20 PM           GARCÍA Lipscomb  [facility]   [phone]

## 2020-05-23 NOTE — PLAN OF CARE
VSS. Afebrile. C/o pain/discomfort to L hip. PRN pain medications given per MAR which have been effective. Pt alert and oriented but forgetful when awaking. LS diminished throughout. CMS intact. Pt reports having chronic n/t to bilateral feet. Pt has condom catheter in place working well. No other complaints at this time.

## 2020-05-23 NOTE — PROGRESS NOTES
S: Patient discharged to [facility]  via w/c with [employer]    B: Fall, Left hip fracture     A: Pain controlled with oxycodone. Alert, forgetful, cooperative. VSS, afebrile. Lungs clear. Left hip dressing clean, dry and intact. CMS intact, edema in bilateral feet.      R:Report called to Antonia BUTLER. Listed belongings gathered and sent with patient.     Discharge Nursing Criteria:     Care Plan and Patient education resolved: Yes    Core Measures   Core Measures applicable during stay (Stroke/TIA, MI, Pneumonia and SSI): NA    [facility]  Home and hospital aquired medications returned to patient: Yes  Medication Bin checked and emptied on discharge Yes  All paperwork sent with patient/Copy of AVS given to patient or family Yes.

## 2020-05-23 NOTE — DISCHARGE SUMMARY
[facility] Discharge Summary     Jerry Rodriguez MRN# [id number]   YOB: 1935 Age: 85 year old     Date of Admission:  5/20/2020  Date of Discharge:  5/23/2020   Admitting Physician:  Pola Rose MD  Discharge Physician:  BHAVANA Mayer CNP  Discharging Service:  Orthopedics     Primary Provider: Roberta Beaver          Admission Diagnoses:   Closed fracture of neck of left femur, initial encounter (H) [S72.002A]          Discharge Diagnosis:     Principal Problem:    Closed fracture of left hip (H)  Active Problems:    Tobacco use disorder    Hyponatremia    Femoral neck fracture (H)    Acute kidney injury (H)    Elevated blood pressure reading without diagnosis of hypertension               Discharge Disposition:     Discharge to [facility]           Condition on Discharge:     Discharge condition: Stable   Discharge vitals: Blood pressure (!) 165/93, pulse 85, temperature 96.7  F (35.9  C), temperature source Oral, resp. rate 18, weight 85.3 kg (188 lb), SpO2 92 %.   Code status on discharge: Full Code           Procedures / Labs / Imaging:   No other procedures performed during this admission          Medications Prior to Admission:     Medications Prior to Admission   Medication Sig Dispense Refill Last Dose     Spacer/Aero-Holding Chambers (VALVED HOLDING CHAMBER) JULISSA 1 Device 2 times daily To be used with flovent 1 each 1      [DISCONTINUED] busPIRone (BUSPAR) 5 MG tablet TAKE ONE TABLET BY MOUTH TWICE DAILY  180 tablet 1 5/20/2020 at 0800     [DISCONTINUED] fluticasone (FLONASE) 50 MCG/ACT spray Spray 1-2 sprays into both nostrils daily 1 Bottle 11 5/20/2020 at 0800     [DISCONTINUED] multivitamin, therapeutic with minerals (MULTI-VITAMIN) TABS Take 1 tablet by mouth daily   5/20/2020 at 0800     [DISCONTINUED] timolol (TIMOPTIC) 0.5 % ophthalmic solution Apply 1 drop to eye 2 times daily   5/20/2020 at 0800     [DISCONTINUED] VENTOLIN HFA  108 (90 Base) MCG/ACT inhaler INHALE TWO PUFFS BY MOUTH EVERY SIX HOURS AS NEEDED FOR SHORTNESS OF BREATH OR WHEEZING. LAST REFILL UNTIL SEEN  18 g 3 5/19/2020 at 2100             Discharge Medications:     Current Discharge Medication List      START taking these medications    Details   acetaminophen (TYLENOL) 325 MG tablet Take 3 tablets (975 mg) by mouth every 8 hours as needed for mild pain or pain  Qty: 100 tablet, Refills: 0    Associated Diagnoses: S/P hip hemiarthroplasty; Closed fracture of left hip, initial encounter (H)      albuterol (PROVENTIL) (2.5 MG/3ML) 0.083% neb solution Take 1 vial (2.5 mg) by nebulization every 4 hours as needed for wheezing  Qty:      Associated Diagnoses: Wheezing; Simple chronic bronchitis (H)      aspirin (ASA) 325 MG EC tablet Take 1 tablet (325 mg) by mouth 2 times daily (with meals)  Qty: 84 tablet, Refills: 0    Associated Diagnoses: S/P hip hemiarthroplasty; Closed fracture of left hip, initial encounter (H)      nicotine (NICODERM CQ) 21 MG/24HR 24 hr patch Place 1 patch onto the skin daily  Qty:      Associated Diagnoses: Tobacco use disorder      omeprazole (PRILOSEC) 20 MG DR capsule Take 1 capsule (20 mg) by mouth 2 times daily (before meals)  Qty:      Associated Diagnoses: Gastroesophageal reflux disease without esophagitis; Stricture and stenosis of esophagus      ondansetron (ZOFRAN-ODT) 4 MG ODT tab Take 1 tablet (4 mg) by mouth every 8 hours as needed for nausea or vomiting  Qty: 10 tablet, Refills: 1    Associated Diagnoses: Gastroesophageal reflux disease without esophagitis; Stricture and stenosis of esophagus      oxyCODONE (ROXICODONE) 5 MG tablet Take 1 tablet (5 mg) by mouth every 3 hours as needed for moderate to severe pain or severe pain  Qty: 30 tablet, Refills: 0    Associated Diagnoses: S/P hip hemiarthroplasty; Closed fracture of left hip, initial encounter (H)      polyethylene glycol (MIRALAX) 17 g packet Take 17 g by mouth daily as needed  for constipation (if no stool the day prior)  Qty: 45 packet, Refills: 1    Associated Diagnoses: S/P hip hemiarthroplasty; Closed fracture of left hip, initial encounter (H)      senna-docusate (SENOKOT-S/PERICOLACE) 8.6-50 MG tablet Take 2 tablets by mouth 2 times daily as needed for constipation (if no stool the day prior)  Qty: 45 tablet, Refills: 1    Associated Diagnoses: S/P hip hemiarthroplasty; Closed fracture of left hip, initial encounter (H)         CONTINUE these medications which have CHANGED    Details   busPIRone (BUSPAR) 5 MG tablet Take 1 tablet (5 mg) by mouth 2 times daily  Qty: 180 tablet, Refills: 1    Associated Diagnoses: Anxiety state      fluticasone (FLONASE) 50 MCG/ACT nasal spray Spray 1-2 sprays into both nostrils daily    Associated Diagnoses: Chronic rhinitis      multivitamin w/minerals (MULTI-VITAMIN) tablet Take 1 tablet by mouth daily  Qty:      Associated Diagnoses: Elevated blood pressure reading without diagnosis of hypertension      timolol maleate (TIMOPTIC) 0.5 % ophthalmic solution Apply 1 drop to eye 2 times daily  Qty:      Associated Diagnoses: Glaucoma, unspecified glaucoma type, unspecified laterality         CONTINUE these medications which have NOT CHANGED    Details   Spacer/Aero-Holding Chambers (VALVED HOLDING CHAMBER) JULISSA 1 Device 2 times daily To be used with flovent  Qty: 1 each, Refills: 1    Associated Diagnoses: Simple chronic bronchitis (H)         STOP taking these medications       VENTOLIN  (90 Base) MCG/ACT inhaler Comments:   Reason for Stopping:                     Consultations:     Consultation during this admission received from hospitalist             Brief History of Illness:   See operative report          Hospital Course:     Patient initially admitted due to a fall at home, subsequent proximal femur fracture and then underwent yeny arthroplasty on hospital day 1.  Patient continued admission after surgery.  Patient discharged POD2.  By  day of discharge discharge: Did well. Per patient felt good.    Continued to improve.  Pain was well-controlled with oral medications.  No fevers.   denied N/v. tolerated oral intake.   Patient had a condom style henley and was voiding adequate. Passing gas and denied abd pain. Initially in POD1 had a mildly elevated creatinine, this returned to normal with a fluid bolus. Did have some mild confusion evening of POD1 but this cleared quickly and without any intervention. By POD2 was doing well.  Although ideally he wanted to return home to his spouse, the patient is her caregiver.  physical therapy, medical, and orthopedic team all recommended TCU due to impaired mobility, need for assistance with transfers, for more intense rehab and therapy. Patient and family was agreeable. He was discharged without incident to [facility] ([facility] access hospital).                            Significant Results:     None             Pending Results:     None           Discharge Instructions and Follow-Up:     Discharge diet: Regular   Discharge activity: Activity as tolerated   Discharge follow-up: 14 days with Dr. Rose   Effected Extremity Left leg       Weight Bearing status Weight bearing as tolerated with posterior hip precautions       Lines and drains: None    Wound care: Keep incision covered with hospital dressing (Aquacel) for one week. It is okay to shower with this dressing on. However, do not submerge your dressing and incision in water for roughly 2 weeks. After one week remove this dressing and then keep it clean, dry, and covered. If this dressing become looses or falls off it is ok to cover with gauze and tape.  Wash the incision gently with warm soapy water after removing your hospital dressing and lightly pat dry.            BHAVANA Garrett, CNP  Orthopedic Surgery

## 2020-05-23 NOTE — PROVIDER NOTIFICATION
DATE:  5/22/2020   TIME OF RECEIPT FROM LAB:  2300  LAB TEST:  covid 19  LAB VALUE:  negative  RESULTS GIVEN WITH READ-BACK TO (PROVIDER):  Dr Quach  TIME LAB VALUE REPORTED TO PROVIDER:   0839    Patient was not on precautions at this time, he was tested for this for placement to rehab prior to going home from post op ORIF of left hip.

## 2020-05-26 ENCOUNTER — PATIENT OUTREACH (OUTPATIENT)
Dept: CARE COORDINATION | Facility: CLINIC | Age: 85
End: 2020-05-26

## 2020-05-26 DIAGNOSIS — S72.002A CLOSED FRACTURE OF NECK OF LEFT FEMUR, INITIAL ENCOUNTER (H): Primary | ICD-10-CM

## 2020-05-26 NOTE — PROGRESS NOTES
Clinic Care Coordination Contact  Care Coordination Transition Communication    Clinical Data:    [facility]   Date of Admission:                  5/20/2020  Date of Discharge:                  5/23/2020          Principal Problem:  Closed fracture of left hip   Fall    Transition to Facility:              Facility Name: [facility] [phone], Fax: [phone]              Contact name and phone number/fax: Carmen CARNES [phone]    Plan: RN/SW Care Coordinator will await notification from facility staff informing RN/SW Care Coordinator of patient's discharge plans/needs. RN/SW Care Coordinator will review chart and outreach to facility staff every 4 weeks and as needed.     GERONIMO Valera RN Clinic Care Coordinator   [place], [place], [place]  Phone: [phone]

## 2020-06-05 ENCOUNTER — HOSPITAL ENCOUNTER (INPATIENT)
Facility: CLINIC | Age: 85
LOS: 4 days | Discharge: NURSING, REHAB OR LONG-TERM CARE | DRG: 698 | End: 2020-06-09
Attending: NURSE PRACTITIONER | Admitting: FAMILY MEDICINE
Payer: COMMERCIAL

## 2020-06-05 ENCOUNTER — APPOINTMENT (OUTPATIENT)
Dept: GENERAL RADIOLOGY | Facility: CLINIC | Age: 85
DRG: 698 | End: 2020-06-05
Attending: NURSE PRACTITIONER
Payer: COMMERCIAL

## 2020-06-05 DIAGNOSIS — R03.0 ELEVATED BLOOD PRESSURE READING WITHOUT DIAGNOSIS OF HYPERTENSION: ICD-10-CM

## 2020-06-05 DIAGNOSIS — R09.02 HYPOXIA: ICD-10-CM

## 2020-06-05 DIAGNOSIS — N39.0 URINARY TRACT INFECTION ASSOCIATED WITH INDWELLING URETHRAL CATHETER, INITIAL ENCOUNTER (H): Primary | ICD-10-CM

## 2020-06-05 DIAGNOSIS — Z20.822 SUSPECTED 2019 NOVEL CORONAVIRUS INFECTION: ICD-10-CM

## 2020-06-05 DIAGNOSIS — R06.2 WHEEZING: ICD-10-CM

## 2020-06-05 DIAGNOSIS — F41.1 ANXIETY STATE: ICD-10-CM

## 2020-06-05 DIAGNOSIS — Z03.818 ENCNTR FOR OBS FOR SUSP EXPSR TO OTH BIOLG AGENTS RULED OUT: ICD-10-CM

## 2020-06-05 DIAGNOSIS — E87.1 HYPONATREMIA: ICD-10-CM

## 2020-06-05 DIAGNOSIS — F17.200 TOBACCO USE DISORDER: ICD-10-CM

## 2020-06-05 DIAGNOSIS — N40.1 BENIGN PROSTATIC HYPERPLASIA WITH URINARY RETENTION: ICD-10-CM

## 2020-06-05 DIAGNOSIS — K22.2 STRICTURE AND STENOSIS OF ESOPHAGUS: ICD-10-CM

## 2020-06-05 DIAGNOSIS — N39.0 URINARY TRACT INFECTION IN MALE: ICD-10-CM

## 2020-06-05 DIAGNOSIS — K21.9 GASTROESOPHAGEAL REFLUX DISEASE WITHOUT ESOPHAGITIS: ICD-10-CM

## 2020-06-05 DIAGNOSIS — T83.511A URINARY TRACT INFECTION ASSOCIATED WITH INDWELLING URETHRAL CATHETER, INITIAL ENCOUNTER (H): Primary | ICD-10-CM

## 2020-06-05 DIAGNOSIS — J31.0 CHRONIC RHINITIS: ICD-10-CM

## 2020-06-05 DIAGNOSIS — R50.9 FEBRILE ILLNESS: ICD-10-CM

## 2020-06-05 DIAGNOSIS — J41.0 SIMPLE CHRONIC BRONCHITIS (H): ICD-10-CM

## 2020-06-05 DIAGNOSIS — H40.9 GLAUCOMA, UNSPECIFIED GLAUCOMA TYPE, UNSPECIFIED LATERALITY: ICD-10-CM

## 2020-06-05 DIAGNOSIS — R33.8 BENIGN PROSTATIC HYPERPLASIA WITH URINARY RETENTION: ICD-10-CM

## 2020-06-05 DIAGNOSIS — Z96.649 S/P HIP HEMIARTHROPLASTY: ICD-10-CM

## 2020-06-05 DIAGNOSIS — S72.002A CLOSED FRACTURE OF LEFT HIP, INITIAL ENCOUNTER (H): ICD-10-CM

## 2020-06-05 PROBLEM — Z98.890 HISTORY OF REPAIR OF HIP FRACTURE: Status: ACTIVE | Noted: 2020-05-21

## 2020-06-05 LAB
ALBUMIN SERPL-MCNC: 2.6 G/DL (ref 3.4–5)
ALBUMIN UR-MCNC: 100 MG/DL
ALP SERPL-CCNC: 112 U/L (ref 40–150)
ALT SERPL W P-5'-P-CCNC: 24 U/L (ref 0–70)
ANION GAP SERPL CALCULATED.3IONS-SCNC: 9 MMOL/L (ref 3–14)
APPEARANCE UR: ABNORMAL
AST SERPL W P-5'-P-CCNC: 23 U/L (ref 0–45)
BACTERIA #/AREA URNS HPF: ABNORMAL /HPF
BASOPHILS # BLD AUTO: 0 10E9/L (ref 0–0.2)
BASOPHILS NFR BLD AUTO: 0.2 %
BILIRUB SERPL-MCNC: 0.9 MG/DL (ref 0.2–1.3)
BILIRUB UR QL STRIP: NEGATIVE
BUN SERPL-MCNC: 20 MG/DL (ref 7–30)
CALCIUM SERPL-MCNC: 7.7 MG/DL (ref 8.5–10.1)
CHLORIDE SERPL-SCNC: 87 MMOL/L (ref 94–109)
CO2 SERPL-SCNC: 25 MMOL/L (ref 20–32)
COLOR UR AUTO: YELLOW
CREAT SERPL-MCNC: 1.24 MG/DL (ref 0.66–1.25)
CRP SERPL-MCNC: 70.9 MG/L (ref 0–8)
DIFFERENTIAL METHOD BLD: ABNORMAL
EOSINOPHIL NFR BLD AUTO: 0.1 %
ERYTHROCYTE [DISTWIDTH] IN BLOOD BY AUTOMATED COUNT: 12.6 % (ref 10–15)
GFR SERPL CREATININE-BSD FRML MDRD: 53 ML/MIN/{1.73_M2}
GLUCOSE SERPL-MCNC: 124 MG/DL (ref 70–99)
GLUCOSE UR STRIP-MCNC: NEGATIVE MG/DL
HCT VFR BLD AUTO: 37.4 % (ref 40–53)
HGB BLD-MCNC: 12.8 G/DL (ref 13.3–17.7)
HGB UR QL STRIP: ABNORMAL
IMM GRANULOCYTES # BLD: 0.1 10E9/L (ref 0–0.4)
IMM GRANULOCYTES NFR BLD: 1 %
KETONES UR STRIP-MCNC: NEGATIVE MG/DL
LACTATE BLD-SCNC: 1 MMOL/L (ref 0.7–2)
LEUKOCYTE ESTERASE UR QL STRIP: ABNORMAL
LYMPHOCYTES # BLD AUTO: 0.2 10E9/L (ref 0.8–5.3)
LYMPHOCYTES NFR BLD AUTO: 1.6 %
MCH RBC QN AUTO: 29.4 PG (ref 26.5–33)
MCHC RBC AUTO-ENTMCNC: 34.2 G/DL (ref 31.5–36.5)
MCV RBC AUTO: 86 FL (ref 78–100)
MONOCYTES # BLD AUTO: 0.4 10E9/L (ref 0–1.3)
MONOCYTES NFR BLD AUTO: 3.8 %
MUCOUS THREADS #/AREA URNS LPF: PRESENT /LPF
NEUTROPHILS # BLD AUTO: 10.8 10E9/L (ref 1.6–8.3)
NEUTROPHILS NFR BLD AUTO: 93.3 %
NITRATE UR QL: POSITIVE
NRBC # BLD AUTO: 0 10*3/UL
NRBC BLD AUTO-RTO: 0 /100
NT-PROBNP SERPL-MCNC: 697 PG/ML (ref 0–1800)
PH UR STRIP: 5 PH (ref 5–7)
PLATELET # BLD AUTO: 309 10E9/L (ref 150–450)
POTASSIUM SERPL-SCNC: 4 MMOL/L (ref 3.4–5.3)
PROT SERPL-MCNC: 7 G/DL (ref 6.8–8.8)
RBC # BLD AUTO: 4.35 10E12/L (ref 4.4–5.9)
RBC #/AREA URNS AUTO: >182 /HPF (ref 0–2)
SARS-COV-2 PCR COMMENT: NORMAL
SARS-COV-2 RNA SPEC QL NAA+PROBE: NEGATIVE
SARS-COV-2 RNA SPEC QL NAA+PROBE: NORMAL
SODIUM SERPL-SCNC: 121 MMOL/L (ref 133–144)
SOURCE: ABNORMAL
SP GR UR STRIP: 1.02 (ref 1–1.03)
SPECIMEN SOURCE: NORMAL
SPECIMEN SOURCE: NORMAL
UROBILINOGEN UR STRIP-MCNC: 4 MG/DL (ref 0–2)
WBC # BLD AUTO: 11.6 10E9/L (ref 4–11)
WBC #/AREA URNS AUTO: 121 /HPF (ref 0–5)

## 2020-06-05 PROCEDURE — 83605 ASSAY OF LACTIC ACID: CPT | Performed by: NURSE PRACTITIONER

## 2020-06-05 PROCEDURE — 87088 URINE BACTERIA CULTURE: CPT | Performed by: NURSE PRACTITIONER

## 2020-06-05 PROCEDURE — 80053 COMPREHEN METABOLIC PANEL: CPT | Performed by: NURSE PRACTITIONER

## 2020-06-05 PROCEDURE — 71045 X-RAY EXAM CHEST 1 VIEW: CPT | Mod: TC

## 2020-06-05 PROCEDURE — 87040 BLOOD CULTURE FOR BACTERIA: CPT | Performed by: NURSE PRACTITIONER

## 2020-06-05 PROCEDURE — 25000132 ZZH RX MED GY IP 250 OP 250 PS 637: Performed by: FAMILY MEDICINE

## 2020-06-05 PROCEDURE — 25800030 ZZH RX IP 258 OP 636: Performed by: NURSE PRACTITIONER

## 2020-06-05 PROCEDURE — 87086 URINE CULTURE/COLONY COUNT: CPT | Performed by: NURSE PRACTITIONER

## 2020-06-05 PROCEDURE — 96365 THER/PROPH/DIAG IV INF INIT: CPT

## 2020-06-05 PROCEDURE — 85025 COMPLETE CBC W/AUTO DIFF WBC: CPT | Performed by: NURSE PRACTITIONER

## 2020-06-05 PROCEDURE — 83880 ASSAY OF NATRIURETIC PEPTIDE: CPT | Performed by: NURSE PRACTITIONER

## 2020-06-05 PROCEDURE — U0003 INFECTIOUS AGENT DETECTION BY NUCLEIC ACID (DNA OR RNA); SEVERE ACUTE RESPIRATORY SYNDROME CORONAVIRUS 2 (SARS-COV-2) (CORONAVIRUS DISEASE [COVID-19]), AMPLIFIED PROBE TECHNIQUE, MAKING USE OF HIGH THROUGHPUT TECHNOLOGIES AS DESCRIBED BY CMS-2020-01-R: HCPCS | Performed by: NURSE PRACTITIONER

## 2020-06-05 PROCEDURE — 87186 SC STD MICRODIL/AGAR DIL: CPT | Performed by: NURSE PRACTITIONER

## 2020-06-05 PROCEDURE — 99223 1ST HOSP IP/OBS HIGH 75: CPT | Mod: AI | Performed by: FAMILY MEDICINE

## 2020-06-05 PROCEDURE — 86140 C-REACTIVE PROTEIN: CPT | Performed by: NURSE PRACTITIONER

## 2020-06-05 PROCEDURE — 25000128 H RX IP 250 OP 636: Performed by: NURSE PRACTITIONER

## 2020-06-05 PROCEDURE — 96361 HYDRATE IV INFUSION ADD-ON: CPT | Performed by: FAMILY MEDICINE

## 2020-06-05 PROCEDURE — 99285 EMERGENCY DEPT VISIT HI MDM: CPT | Mod: 25 | Performed by: FAMILY MEDICINE

## 2020-06-05 PROCEDURE — 25000132 ZZH RX MED GY IP 250 OP 250 PS 637: Performed by: NURSE PRACTITIONER

## 2020-06-05 PROCEDURE — C9803 HOPD COVID-19 SPEC COLLECT: HCPCS | Performed by: FAMILY MEDICINE

## 2020-06-05 PROCEDURE — 81001 URINALYSIS AUTO W/SCOPE: CPT | Performed by: NURSE PRACTITIONER

## 2020-06-05 PROCEDURE — 25000125 ZZHC RX 250: Performed by: FAMILY MEDICINE

## 2020-06-05 PROCEDURE — 12000000 ZZH R&B MED SURG/OB

## 2020-06-05 PROCEDURE — 96366 THER/PROPH/DIAG IV INF ADDON: CPT | Performed by: FAMILY MEDICINE

## 2020-06-05 PROCEDURE — 84145 PROCALCITONIN (PCT): CPT | Performed by: FAMILY MEDICINE

## 2020-06-05 RX ORDER — NALOXONE HYDROCHLORIDE 0.4 MG/ML
.1-.4 INJECTION, SOLUTION INTRAMUSCULAR; INTRAVENOUS; SUBCUTANEOUS
Status: DISCONTINUED | OUTPATIENT
Start: 2020-06-05 | End: 2020-06-09 | Stop reason: HOSPADM

## 2020-06-05 RX ORDER — TIMOLOL MALEATE 5 MG/ML
2 SOLUTION/ DROPS OPHTHALMIC 2 TIMES DAILY
Status: DISCONTINUED | OUTPATIENT
Start: 2020-06-05 | End: 2020-06-09 | Stop reason: HOSPADM

## 2020-06-05 RX ORDER — SODIUM CHLORIDE 9 MG/ML
INJECTION, SOLUTION INTRAVENOUS CONTINUOUS
Status: DISCONTINUED | OUTPATIENT
Start: 2020-06-05 | End: 2020-06-08

## 2020-06-05 RX ORDER — TAMSULOSIN HYDROCHLORIDE 0.4 MG/1
0.4 CAPSULE ORAL AT BEDTIME
Status: DISCONTINUED | OUTPATIENT
Start: 2020-06-05 | End: 2020-06-09 | Stop reason: HOSPADM

## 2020-06-05 RX ORDER — LIDOCAINE 40 MG/G
CREAM TOPICAL
Status: DISCONTINUED | OUTPATIENT
Start: 2020-06-05 | End: 2020-06-09 | Stop reason: HOSPADM

## 2020-06-05 RX ORDER — ONDANSETRON 2 MG/ML
4 INJECTION INTRAMUSCULAR; INTRAVENOUS EVERY 6 HOURS PRN
Status: DISCONTINUED | OUTPATIENT
Start: 2020-06-05 | End: 2020-06-09 | Stop reason: HOSPADM

## 2020-06-05 RX ORDER — POTASSIUM CHLORIDE 1.5 G/1.58G
20 POWDER, FOR SOLUTION ORAL DAILY
Status: ON HOLD | COMMUNITY
End: 2020-06-09

## 2020-06-05 RX ORDER — FLUTICASONE PROPIONATE 50 MCG
1 SPRAY, SUSPENSION (ML) NASAL DAILY
Status: ON HOLD | COMMUNITY
End: 2020-06-09

## 2020-06-05 RX ORDER — PROCHLORPERAZINE 25 MG
12.5 SUPPOSITORY, RECTAL RECTAL EVERY 12 HOURS PRN
Status: DISCONTINUED | OUTPATIENT
Start: 2020-06-05 | End: 2020-06-09 | Stop reason: HOSPADM

## 2020-06-05 RX ORDER — HYDROCHLOROTHIAZIDE 25 MG/1
25 TABLET ORAL DAILY
Status: ON HOLD | COMMUNITY
End: 2020-06-09

## 2020-06-05 RX ORDER — ACETAMINOPHEN 325 MG/1
650 TABLET ORAL EVERY 4 HOURS PRN
Status: DISCONTINUED | OUTPATIENT
Start: 2020-06-05 | End: 2020-06-09 | Stop reason: HOSPADM

## 2020-06-05 RX ORDER — ONDANSETRON 4 MG/1
4 TABLET, ORALLY DISINTEGRATING ORAL EVERY 6 HOURS PRN
Status: DISCONTINUED | OUTPATIENT
Start: 2020-06-05 | End: 2020-06-09 | Stop reason: HOSPADM

## 2020-06-05 RX ORDER — ACETAMINOPHEN 325 MG/1
650 TABLET ORAL ONCE
Status: COMPLETED | OUTPATIENT
Start: 2020-06-05 | End: 2020-06-05

## 2020-06-05 RX ORDER — TAMSULOSIN HYDROCHLORIDE 0.4 MG/1
0.4 CAPSULE ORAL AT BEDTIME
Status: ON HOLD | COMMUNITY
End: 2020-06-09

## 2020-06-05 RX ORDER — CEFTRIAXONE 1 G/1
1 INJECTION, POWDER, FOR SOLUTION INTRAMUSCULAR; INTRAVENOUS ONCE
Status: COMPLETED | OUTPATIENT
Start: 2020-06-05 | End: 2020-06-05

## 2020-06-05 RX ORDER — PROCHLORPERAZINE MALEATE 5 MG
5 TABLET ORAL EVERY 6 HOURS PRN
Status: DISCONTINUED | OUTPATIENT
Start: 2020-06-05 | End: 2020-06-09 | Stop reason: HOSPADM

## 2020-06-05 RX ORDER — NICOTINE 21 MG/24HR
1 PATCH, TRANSDERMAL 24 HOURS TRANSDERMAL DAILY
Status: DISCONTINUED | OUTPATIENT
Start: 2020-06-06 | End: 2020-06-09 | Stop reason: HOSPADM

## 2020-06-05 RX ORDER — BUSPIRONE HYDROCHLORIDE 5 MG/1
5 TABLET ORAL 2 TIMES DAILY
Status: DISCONTINUED | OUTPATIENT
Start: 2020-06-05 | End: 2020-06-09 | Stop reason: HOSPADM

## 2020-06-05 RX ORDER — CEFTRIAXONE 1 G/1
1 INJECTION, POWDER, FOR SOLUTION INTRAMUSCULAR; INTRAVENOUS EVERY 24 HOURS
Status: DISCONTINUED | OUTPATIENT
Start: 2020-06-06 | End: 2020-06-06

## 2020-06-05 RX ADMIN — ACETAMINOPHEN 650 MG: 325 TABLET, FILM COATED ORAL at 18:11

## 2020-06-05 RX ADMIN — FLUTICASONE FUROATE AND VILANTEROL TRIFENATATE 1 PUFF: 100; 25 POWDER RESPIRATORY (INHALATION) at 23:05

## 2020-06-05 RX ADMIN — BUSPIRONE HYDROCHLORIDE 5 MG: 5 TABLET ORAL at 23:07

## 2020-06-05 RX ADMIN — TAMSULOSIN HYDROCHLORIDE 0.4 MG: 0.4 CAPSULE ORAL at 23:07

## 2020-06-05 RX ADMIN — CEFTRIAXONE SODIUM 1 G: 1 INJECTION, POWDER, FOR SOLUTION INTRAMUSCULAR; INTRAVENOUS at 20:37

## 2020-06-05 RX ADMIN — TIMOLOL MALEATE 2 DROP: 5 SOLUTION/ DROPS OPHTHALMIC at 23:07

## 2020-06-05 RX ADMIN — SODIUM CHLORIDE 1000 ML: 9 INJECTION, SOLUTION INTRAVENOUS at 18:35

## 2020-06-05 ASSESSMENT — MIFFLIN-ST. JEOR: SCORE: 1497.75

## 2020-06-05 NOTE — LETTER
Transition Communication Hand-off for Care Transitions to Next Level of Care Provider    Name: Jerry Rodriguez  : 1935  MRN #: [id number]  Primary Care Provider: Roberta Beaver MD  Primary Care MD Name: Dr. Beaver   Primary Clinic: [facility] [address]  Primary Care Clinic [facility]: [facility]   Reason for Hospitalization:  Hyponatremia [E87.1]  Hypoxia [R09.02]  Febrile illness [R50.9]  Urinary tract infection in male [N39.0]  Suspected 2019 Novel Coronavirus Infection [Z20.828]  Admit Date/Time: 2020  5:34 PM  Discharge Date: 20  Payor Source: Payor: [facility] / Plan: BCBS MEDICARE ADVANTAGE / Product Type: Medicare /     Readmission Assessment Measure (DARREN) Risk Score/category: Elevated    Reason for Communication Hand-off Referral: Fragility    Discharge Plan:  Discharge Plan:      Most Recent Value   Disposition Comments  [facility]   Concerns Comments  Has a bed hold         Concern for non-adherence with plan of care:   Y/N : No    Discharge Needs Assessment:  Needs      Most Recent Value   Anticipated Changes Related to Illness  inability to care for self   Equipment Currently Used at Home  cane, straight, cane, quad, walker, rolling   # of Referrals Placed by [facility]  Internal Clinic Care Coordination, Post Acute Facilities, Transportation   Other Resources  Transportation Services   Transportation Agency  [facility] [phone], Fax: [phone]        Follow-up plan:    Future Appointments   Date Time Provider Department Center   2020  2:30 PM Fariba Boland PT [facility]   2020 11:15 AM Anton Mckeon MD [facility] CLIN       Any outstanding tests or procedures:        Referrals     Future Labs/Procedures    UROLOGY ADULT REFERRAL     Comments:    Your provider has referred you to: [facility]: [facility] [phone]   [email or web address]    Please be aware that coverage of these services is subject to the terms and limitations of your health insurance plan.  Call member services at your health plan with any benefit or coverage questions.      Please bring the following with you to your appointment:    (1) Any X-Rays, CTs or MRIs which have been performed.  Contact the facility where they were done to arrange for  prior to your scheduled appointment.    (2) List of current medications  (3) This referral request   (4) Any documents/labs given to you for this referral            GARCÍA Arthur  [facility] [phone]/ [facility] [phone]    AVS/Discharge Summary is the source of truth; this is a helpful guide for improved communication of patient story

## 2020-06-05 NOTE — ED PROVIDER NOTES
History     Chief Complaint   Patient presents with     Fever     HPI  Jerry Rodriguez is a 85 year old male who presents to the emergency room via EMS from nursing home with concerns of fever and shortness of breath.  Patient reports that both of these symptoms started this morning.  Patient is currently on Bactrim for a UTI.  Patient also complains of rhinorrhea, he denies any chest pain.  Patient does endorse mild nausea but denies any vomiting.  Activity makes him feel more short of breath.  Patient was reportedly hypoxic on EMS arrival and arrived here with 10 L of oxygen via mask.     Allergies:  Allergies   Allergen Reactions     No Known Drug Allergies        Problem List:    Patient Active Problem List    Diagnosis Date Noted     Acute kidney injury (H) 05/22/2020     Priority: Medium     Elevated blood pressure reading without diagnosis of hypertension 05/22/2020     Priority: Medium     Femoral neck fracture (H) 05/21/2020     Priority: Medium     Closed fracture of left hip (H) 05/20/2020     Priority: Medium     Simple chronic bronchitis (H) 08/10/2017     Priority: Medium     Lumbosacral neuritis - moderate at L4/5 08/01/2014     Priority: Medium     Multilevel.  Moderate at L4/5.  MRI 7/2014       Degeneration of lumbar intervertebral disc 08/01/2014     Priority: Medium     HTN, goal below 140/90 03/31/2014     Priority: Medium     Vertigo 03/31/2014     Priority: Medium     Adenomatous polyp of colon 03/31/2014     Priority: Medium     Bullous dermatitis 08/13/2013     Priority: Medium     NOEMI positive 02/20/2013     Priority: Medium     Hammer toe 02/06/2013     Priority: Medium     Corn 02/06/2013     Priority: Medium     Degenerative arthritis of finger 05/23/2011     Priority: Medium     (Problem list name updated by automated process. Provider to review and confirm.)       Hyponatremia 01/13/2011     Priority: Medium     Tobacco use disorder 02/13/2009     Priority: Medium     HTN  (hypertension) 01/28/2009     Priority: Medium     Anxiety state      Priority: Medium     Problem list name updated by automated process. Provider to review       Stricture and stenosis of esophagus 08/02/2004     Priority: Medium     Primary localized osteoarthrosis of shoulder region 01/07/2004     Priority: Medium     Problem list name updated by automated process. Provider to review       Chronic rhinitis 04/05/2002     Priority: Medium     Generalized osteoarthrosis, unspecified site 04/05/2002     Priority: Medium     Other atopic dermatitis and related conditions      Priority: Medium        Past Medical History:    Past Medical History:   Diagnosis Date     Anxiety state, unspecified      Arthritis      Hypertension      Hyponatremia      Inguinal hernia with obstruction, without mention of gangrene, unilateral or unspecified, (not specified as recurrent)      Inguinal hernia without mention of obstruction or gangrene, unilateral or unspecified, (not specified as recurrent) 11/07/2000     Other joint derangement, not elsewhere classified, forearm 1980     Pathologic fracture of neck of femur (H)      Stricture and stenosis of esophagus 2004       Past Surgical History:    Past Surgical History:   Procedure Laterality Date     C OSTEOTOMY HIP/FEMUR  1980    traumatic fracture right hip     COLONOSCOPY  10/17/2007    Repeat Colonoscopy in 5 years for surveillance.     COLONOSCOPY N/A 1/24/2018    Procedure: COLONOSCOPY;;  Surgeon: Don Arambula MD;  Location:  GI     ESOPHAGOSCOPY, GASTROSCOPY, DUODENOSCOPY (EGD), COMBINED  1/4/2011    COMBINED ESOPHAGOSCOPY, GASTROSCOPY, DUODENOSCOPY (EGD), ESOPHAGEAL DILATION BALLOON LESS THAN 30MM performed by ELEN BUSBY at  GI     ESOPHAGOSCOPY, GASTROSCOPY, DUODENOSCOPY (EGD), COMBINED N/A 9/25/2015    Procedure: COMBINED ESOPHAGOSCOPY, GASTROSCOPY, DUODENOSCOPY (EGD), BIOPSY SINGLE OR MULTIPLE;  Surgeon: Markell Lawson MD;  Location:  GI      ESOPHAGOSCOPY, GASTROSCOPY, DUODENOSCOPY (EGD), COMBINED N/A 1/24/2018    Procedure: COMBINED ESOPHAGOSCOPY, GASTROSCOPY, DUODENOSCOPY (EGD), BIOPSY SINGLE OR MULTIPLE;;  Surgeon: Don Arambula MD;  Location: [facility]     ESOPHAGOSCOPY, GASTROSCOPY, DUODENOSCOPY (EGD), DILATATION, COMBINED N/A 1/24/2018    Procedure: COMBINED ESOPHAGOSCOPY, GASTROSCOPY, DUODENOSCOPY (EGD), DILATATION;  Esophagogastroduodenoscopy with Dilatation and Biopsy by Biopsy, Colonoscopy;  Surgeon: Don Arambula MD;  Location: [facility]     HC COLONOSCOPY THRU STOMA, DIAGNOSTIC  11/15/2000    Recurrent abdominal pain     HC COLONOSCOPY W/WO BRUSH/WASH  07/19/2004     HC REPAIR INCISIONAL HERNIA,REDUCIBLE  11/10/2000    Hernia Repair, Incisional, Unilateral, Left inguinal hernia and umbilical hernia     HC UGI ENDOSCOPY DIAG W OR W/O BRUSH/WASH  07/19/2004     HC UGI ENDOSCOPY DIAG W OR W/O BRUSH/WASH  05/24/2005    Peptic stricture of esophagus, dilated to 18 mm. Erosive reflux esophagitis. Hiatal hernia. Erosive duodenitis.     HC UGI ENDOSCOPY, SIMPLE EXAM  02/20/07     HC UGI ENDOSCOPY, SIMPLE EXAM  12/02/08    Schiatzky's ring, dialated, PPI indefinitely     HC UGI ENDOSCOPY, SIMPLE EXAM  12/15/2009    benign stricture with dialation     LAPAROSCOPIC APPENDECTOMY  09/15/2005     OPEN REDUCTION INTERNAL FIXATION HIP BIPOLAR Left 5/21/2020    Procedure: HEMIARTHROPLASTY, HIP, BIPOLAR;  Surgeon: Pola Rose MD;  Location: [facility]       Family History:    Family History   Problem Relation Age of Onset     Cancer Sister         brain tumor     Diabetes Sister      Alzheimer Disease Sister        Social History:  Marital Status:   [2]  Social History     Tobacco Use     Smoking status: Current Every Day Smoker     Packs/day: 1.00     Years: 52.00     Pack years: 52.00     Types: Cigarettes     Smokeless tobacco: Current User     Types: Chew     Tobacco comment: occasionally/ 1.5tin qwk   Substance Use Topics     Alcohol  use: No     Comment: quit 7-1980     Drug use: No        Medications:    acetaminophen (TYLENOL) 325 MG tablet  albuterol (PROVENTIL) (2.5 MG/3ML) 0.083% neb solution  aspirin (ASA) 325 MG EC tablet  busPIRone (BUSPAR) 5 MG tablet  fluticasone (FLONASE) 50 MCG/ACT nasal spray  multivitamin w/minerals (MULTI-VITAMIN) tablet  nicotine (NICODERM CQ) 21 MG/24HR 24 hr patch  omeprazole (PRILOSEC) 20 MG DR capsule  ondansetron (ZOFRAN-ODT) 4 MG ODT tab  oxyCODONE (ROXICODONE) 5 MG tablet  polyethylene glycol (MIRALAX) 17 g packet  senna-docusate (SENOKOT-S/PERICOLACE) 8.6-50 MG tablet  Spacer/Aero-Holding Chambers (VALVED HOLDING CHAMBER) JULISSA  timolol maleate (TIMOPTIC) 0.5 % ophthalmic solution          Review of Systems   All other systems reviewed and are negative.      Physical Exam   BP: 138/69  Heart Rate: 90  Temp: 100.7  F (38.2  C)  Resp: 18  SpO2: 94 %      Physical Exam  Constitutional:       Comments: Hard of hearing male, sitting on the bed, in no acute distress   HENT:      Head: Normocephalic.      Mouth/Throat:      Mouth: Mucous membranes are dry.      Pharynx: No posterior oropharyngeal erythema.   Eyes:      Extraocular Movements: Extraocular movements intact.   Neck:      Musculoskeletal: Normal range of motion.   Cardiovascular:      Rate and Rhythm: Normal rate.      Heart sounds: Murmur (2/6 sytolic murmur) present.   Pulmonary:      Effort: Pulmonary effort is normal.      Breath sounds: Rhonchi present.      Comments: Bilateral bases  Abdominal:      General: Bowel sounds are normal.      Palpations: Abdomen is soft.   Musculoskeletal: Normal range of motion.   Skin:     Capillary Refill: Capillary refill takes 2 to 3 seconds.      Coloration: Skin is pale.   Neurological:      General: No focal deficit present.      Mental Status: He is alert.   Psychiatric:         Mood and Affect: Mood normal.         ED Course        Procedures    Results for orders placed or performed during the hospital  encounter of 06/05/20 (from the past 24 hour(s))   XR Chest Port 1 View    Narrative    CHEST ONE VIEW  6/5/2020 6:12 PM     HISTORY: Shortness of breath. Cough.    COMPARISON: 2/17/2019.      Impression    IMPRESSION: Minimal interstitial changes at the bases similar to  previous. No definite acute infiltrates.    LEBRON BARRETT MD   CBC with platelets differential   Result Value Ref Range    WBC 11.6 (H) 4.0 - 11.0 10e9/L    RBC Count 4.35 (L) 4.4 - 5.9 10e12/L    Hemoglobin 12.8 (L) 13.3 - 17.7 g/dL    Hematocrit 37.4 (L) 40.0 - 53.0 %    MCV 86 78 - 100 fl    MCH 29.4 26.5 - 33.0 pg    MCHC 34.2 31.5 - 36.5 g/dL    RDW 12.6 10.0 - 15.0 %    Platelet Count 309 150 - 450 10e9/L    Diff Method Automated Method     % Neutrophils 93.3 %    % Lymphocytes 1.6 %    % Monocytes 3.8 %    % Eosinophils 0.1 %    % Basophils 0.2 %    % Immature Granulocytes 1.0 %    Nucleated RBCs 0 0 /100    Absolute Neutrophil 10.8 (H) 1.6 - 8.3 10e9/L    Absolute Lymphocytes 0.2 (L) 0.8 - 5.3 10e9/L    Absolute Monocytes 0.4 0.0 - 1.3 10e9/L    Absolute Basophils 0.0 0.0 - 0.2 10e9/L    Abs Immature Granulocytes 0.1 0 - 0.4 10e9/L    Absolute Nucleated RBC 0.0    Comprehensive metabolic panel   Result Value Ref Range    Sodium 121 (L) 133 - 144 mmol/L    Potassium 4.0 3.4 - 5.3 mmol/L    Chloride 87 (L) 94 - 109 mmol/L    Carbon Dioxide 25 20 - 32 mmol/L    Anion Gap 9 3 - 14 mmol/L    Glucose 124 (H) 70 - 99 mg/dL    Urea Nitrogen 20 7 - 30 mg/dL    Creatinine 1.24 0.66 - 1.25 mg/dL    GFR Estimate 53 (L) >60 mL/min/[1.73_m2]    GFR Estimate If Black 61 >60 mL/min/[1.73_m2]    Calcium 7.7 (L) 8.5 - 10.1 mg/dL    Bilirubin Total 0.9 0.2 - 1.3 mg/dL    Albumin 2.6 (L) 3.4 - 5.0 g/dL    Protein Total 7.0 6.8 - 8.8 g/dL    Alkaline Phosphatase 112 40 - 150 U/L    ALT 24 0 - 70 U/L    AST 23 0 - 45 U/L   Lactic acid whole blood   Result Value Ref Range    Lactic Acid 1.0 0.7 - 2.0 mmol/L   CRP inflammation   Result Value Ref Range    CRP  Inflammation 70.9 (H) 0.0 - 8.0 mg/L   Nt probnp inpatient   Result Value Ref Range    N-Terminal Pro BNP Inpatient 697 0 - 1,800 pg/mL   UA with Microscopic   Result Value Ref Range    Color Urine Yellow     Appearance Urine Slightly Cloudy     Glucose Urine Negative NEG^Negative mg/dL    Bilirubin Urine Negative NEG^Negative    Ketones Urine Negative NEG^Negative mg/dL    Specific Gravity Urine 1.017 1.003 - 1.035    Blood Urine Large (A) NEG^Negative    pH Urine 5.0 5.0 - 7.0 pH    Protein Albumin Urine 100 (A) NEG^Negative mg/dL    Urobilinogen mg/dL 4.0 (H) 0.0 - 2.0 mg/dL    Nitrite Urine Positive (A) NEG^Negative    Leukocyte Esterase Urine Moderate (A) NEG^Negative    Source Catheterized Urine     WBC Urine 121 (H) 0 - 5 /HPF    RBC Urine >182 (H) 0 - 2 /HPF    Bacteria Urine Many (A) NEG^Negative /HPF    Mucous Urine Present (A) NEG^Negative /LPF       Medications   sodium chloride 0.9% infusion (1,000 mLs Intravenous New Bag 6/5/20 1835)   cefTRIAXone (ROCEPHIN) 1 g vial to attach to  mL bag for ADULTS or NS 50 mL bag for PEDS (has no administration in time range)   acetaminophen (TYLENOL) tablet 650 mg (650 mg Oral Given 6/5/20 1811)       Assessments & Plan (with Medical Decision Making)  Jerry is an 85-year-old male who presents to the emergency room today via EMS from the nursing home with concerns of fever and hypoxia.  Patient also endorses shortness of breath and cough.  Concerns for COVID, patient placed in COVID special precautions.  COVID swab is pending.  Patient was diagnosed with a UTI earlier today and prescribed Bactrim but it does not appear according to his MAR that dose has been given yet.  Fever certainly may be secondary to this or possibly COVID or possibly pneumonia.  Patient has oxygen saturation of 84% here on room air, this does improve to 94% on 2 L.  He is febrile with a temp of 100.7.  Patient given IV fluids.  Blood work obtained including blood cultures.  CBC returns  with a white count of 11.6, hemoglobin stable for patient at 12.8, CRP is elevated at 70.9, lactic acid is normal at 1.  proBNP returns normal at 697.  Urinalysis from Streeter catheter returns with large blood, positive nitrates, moderate leukocytes esterase and 121 white cells, urine culture is pending.  CMP returns concerning with hyponatremia with a sodium of 121, kidney function is stable for patient, calcium is 7.7.  Given patient's multiple diagnoses today, he is appropriate for admission.  His chest x-ray did not show any lobar infiltrate or groundglass opacities but given his shortness of breath and hypoxia in light of fever he is still suspected for COVID especially coming from a multi person dwelling.  He was started on Rocephin for his UTI pending culture.  I discussed his case with our hospitalist, Dr. Herrera who is accepted patient for admission.  Patient was staffed in the emergency room with Dr. Jett, he will be admitted in stable condition.     I have reviewed the nursing notes.    I have reviewed the findings, diagnosis, plan and need for follow up with the patient.    New Prescriptions    No medications on file       Final diagnoses:   Suspected 2019 Novel Coronavirus Infection   Febrile illness   Hyponatremia   Hypoxia   Urinary tract infection in male       6/5/2020   [facility] EMERGENCY DEPARTMENT     Ly Mccracken APRN CNP  06/05/20 2017

## 2020-06-06 PROBLEM — N40.0 BPH (BENIGN PROSTATIC HYPERPLASIA): Status: ACTIVE | Noted: 2020-06-06

## 2020-06-06 PROBLEM — R21 RASH: Status: ACTIVE | Noted: 2020-06-06

## 2020-06-06 LAB
ANION GAP SERPL CALCULATED.3IONS-SCNC: 7 MMOL/L (ref 3–14)
BUN SERPL-MCNC: 21 MG/DL (ref 7–30)
CALCIUM SERPL-MCNC: 7.9 MG/DL (ref 8.5–10.1)
CHLORIDE SERPL-SCNC: 93 MMOL/L (ref 94–109)
CO2 SERPL-SCNC: 24 MMOL/L (ref 20–32)
CREAT SERPL-MCNC: 1.18 MG/DL (ref 0.66–1.25)
ERYTHROCYTE [DISTWIDTH] IN BLOOD BY AUTOMATED COUNT: 12.8 % (ref 10–15)
GFR SERPL CREATININE-BSD FRML MDRD: 56 ML/MIN/{1.73_M2}
GLUCOSE SERPL-MCNC: 90 MG/DL (ref 70–99)
HCT VFR BLD AUTO: 38.9 % (ref 40–53)
HGB BLD-MCNC: 12.6 G/DL (ref 13.3–17.7)
MCH RBC QN AUTO: 28.9 PG (ref 26.5–33)
MCHC RBC AUTO-ENTMCNC: 32.4 G/DL (ref 31.5–36.5)
MCV RBC AUTO: 89 FL (ref 78–100)
PLATELET # BLD AUTO: 258 10E9/L (ref 150–450)
POTASSIUM SERPL-SCNC: 3.8 MMOL/L (ref 3.4–5.3)
PROCALCITONIN SERPL-MCNC: 0.05 NG/ML
PROCALCITONIN SERPL-MCNC: 0.43 NG/ML
RBC # BLD AUTO: 4.36 10E12/L (ref 4.4–5.9)
SODIUM SERPL-SCNC: 124 MMOL/L (ref 133–144)
SODIUM SERPL-SCNC: 125 MMOL/L (ref 133–144)
SODIUM SERPL-SCNC: 127 MMOL/L (ref 133–144)
WBC # BLD AUTO: 7.9 10E9/L (ref 4–11)

## 2020-06-06 PROCEDURE — 12000000 ZZH R&B MED SURG/OB

## 2020-06-06 PROCEDURE — 84145 PROCALCITONIN (PCT): CPT | Performed by: FAMILY MEDICINE

## 2020-06-06 PROCEDURE — 85027 COMPLETE CBC AUTOMATED: CPT | Performed by: FAMILY MEDICINE

## 2020-06-06 PROCEDURE — 36415 COLL VENOUS BLD VENIPUNCTURE: CPT | Performed by: FAMILY MEDICINE

## 2020-06-06 PROCEDURE — 25000128 H RX IP 250 OP 636: Performed by: FAMILY MEDICINE

## 2020-06-06 PROCEDURE — 84295 ASSAY OF SERUM SODIUM: CPT | Performed by: FAMILY MEDICINE

## 2020-06-06 PROCEDURE — 80048 BASIC METABOLIC PNL TOTAL CA: CPT | Performed by: FAMILY MEDICINE

## 2020-06-06 PROCEDURE — 25000132 ZZH RX MED GY IP 250 OP 250 PS 637: Performed by: FAMILY MEDICINE

## 2020-06-06 PROCEDURE — 25800030 ZZH RX IP 258 OP 636: Performed by: FAMILY MEDICINE

## 2020-06-06 PROCEDURE — 99232 SBSQ HOSP IP/OBS MODERATE 35: CPT | Performed by: FAMILY MEDICINE

## 2020-06-06 PROCEDURE — 99207 ZZC CDG-MDM COMPONENT: MEETS LOW - DOWN CODED: CPT | Performed by: FAMILY MEDICINE

## 2020-06-06 RX ORDER — LEVOFLOXACIN 5 MG/ML
500 INJECTION, SOLUTION INTRAVENOUS EVERY 24 HOURS
Status: DISCONTINUED | OUTPATIENT
Start: 2020-06-06 | End: 2020-06-08

## 2020-06-06 RX ADMIN — ASPIRIN 325 MG: 325 TABLET, COATED ORAL at 08:20

## 2020-06-06 RX ADMIN — SODIUM CHLORIDE: 9 INJECTION, SOLUTION INTRAVENOUS at 20:19

## 2020-06-06 RX ADMIN — OMEPRAZOLE 20 MG: 20 CAPSULE, DELAYED RELEASE ORAL at 15:55

## 2020-06-06 RX ADMIN — TIMOLOL MALEATE 2 DROP: 5 SOLUTION/ DROPS OPHTHALMIC at 20:17

## 2020-06-06 RX ADMIN — OMEPRAZOLE 20 MG: 20 CAPSULE, DELAYED RELEASE ORAL at 08:20

## 2020-06-06 RX ADMIN — ASPIRIN 325 MG: 325 TABLET, COATED ORAL at 17:28

## 2020-06-06 RX ADMIN — BUSPIRONE HYDROCHLORIDE 5 MG: 5 TABLET ORAL at 09:14

## 2020-06-06 RX ADMIN — BUSPIRONE HYDROCHLORIDE 5 MG: 5 TABLET ORAL at 20:15

## 2020-06-06 RX ADMIN — SODIUM CHLORIDE: 9 INJECTION, SOLUTION INTRAVENOUS at 12:22

## 2020-06-06 RX ADMIN — TIMOLOL MALEATE 2 DROP: 5 SOLUTION/ DROPS OPHTHALMIC at 09:17

## 2020-06-06 RX ADMIN — NICOTINE 1 PATCH: 21 PATCH TRANSDERMAL at 09:22

## 2020-06-06 RX ADMIN — TAMSULOSIN HYDROCHLORIDE 0.4 MG: 0.4 CAPSULE ORAL at 20:15

## 2020-06-06 RX ADMIN — LEVOFLOXACIN 500 MG: 5 INJECTION, SOLUTION INTRAVENOUS at 13:11

## 2020-06-06 RX ADMIN — SODIUM CHLORIDE: 9 INJECTION, SOLUTION INTRAVENOUS at 04:19

## 2020-06-06 RX ADMIN — FLUTICASONE FUROATE AND VILANTEROL TRIFENATATE 1 PUFF: 100; 25 POWDER RESPIRATORY (INHALATION) at 20:17

## 2020-06-06 ASSESSMENT — ACTIVITIES OF DAILY LIVING (ADL)
ADLS_ACUITY_SCORE: 23

## 2020-06-06 ASSESSMENT — MIFFLIN-ST. JEOR: SCORE: 1505.46

## 2020-06-06 NOTE — CONSULTS
CARE TRANSITION SOCIAL WORK INITIAL ASSESSMENT:      Met with: Family.    DATA  Active Problems:    Tobacco use disorder    Hyponatremia    History of repair of hip fracture    Febrile illness, acute    Urinary tract infection associated with indwelling urethral catheter (H)    Suspected Covid-19 Virus Infection    Hypoxemia       Primary Care Clinic Name: [facility] [place]   Primary Care MD Name: Dr. Beaver     ASSESSMENT  Cognitive Status: awake, alert and oriented.       Resources List: Skilled Nursing Facility      Description of Support System: Involved, Supportive   Who is your support system?: Wife       Insurance Concerns: No Insurance issues identified     This writer met with pt's wife, Estefania, by phone, introduced self and role.   Estefania states that patient has been at [facility] (Admissions: [phone], Main Phone: [phone] Fax: [phone]) for TCU since his hip fracture in May.  Estefania states that patient has a bed hold and plan is for patient to return there at discharge.  Discussed transportation.  Estefania does not remember how patient transported to [facility] last time.  It is likely that patient will need van transport.   Estefania states to talk with Anay, a great niece, about costs, as she is patient's designated trustee.    Anay [phone].  Estefania is aware that patient will be in the hospital until at least Monday for the COVID-19 retest.    PLAN    Return to [facility] (Admissions: [phone], Main Phone: [phone] Fax: [phone]).  Arrange van transport.    Per chart review, patient used [employer] for transport after last hospitalization.    GARCÍA Arthur  [facility] [phone]/ [facility] [phone]

## 2020-06-06 NOTE — PROGRESS NOTES
S-(situation): Patient arrives to room 248 via cart from ED    B-(background): UTI, suspected COVID infection    A-(assessment): pt is awake, alert and oriented x4. Pt very talkative, constant rambling. Vss, afebrile at this time, 96.7 (o). Denies pain. Has recent L hip fx with repair 5/22/2020. Has pinpoint red rash to abdomen, bilateral flank, lower back. Pt states this has been present at the nursing home, prior to arriving here. Lungs are clear, using 2L nc with O2 sats 96%. No cough noted at this time.     R-(recommendations): Orders reviewed with pt. Will monitor patient per MD orders. Await COVID test results, abx for UTI. Monitor respiratory status, sodium lab, IV fluids.     Inpatient nursing criteria listed below were met:    Health care directives status obtained and documented: Yes  SCD's Documented: Yes  Skin issues/needs documented:Yes  Isolation addressed and Signage used: Yes  Fall Prevention: Care plan updated, Education given and documented Yes  Care Plan initiated: Yes  Education Assessment documented:Yes  Education Documented (Pre-existing chronic infection such as, MRSA/VRE need education on admission): Yes  Allergies Reviewed: Yes  Admission Medication Reconciliation completed: Yes  New medication patient education completed and documented (Possible Side Effects of Common Medications handout): Yes  Home medications if not able to send immediately home with family stored here: NA  Reminder note placed in discharge instructions: NA  Discharge planning review completed (admission navigator) Yes

## 2020-06-06 NOTE — ED NOTES
ED Nursing criteria listed below was addressed during verbal handoff:     Abnormal vitals: Yes  Abnormal results: Yes  Med Reconciliation completed: Yes  Meds given in ED: Yes  Any Overdue Meds: No  Core Measures: Yes  Isolation: Yes  Special needs: Yes  Skin assessment: Yes    Observation Patient  Education provided: N/A

## 2020-06-06 NOTE — PROGRESS NOTES
[facility]    Medicine Progress Note - Hospitalist Service       Date of Admission:  6/5/2020  Assessment & Plan    Patient is an 85-year-old gentleman with a past medical history of recent hip surgery repair following fracture, chronic hyponatremia (normally mild in the upper 120 to low 130 range), and BPH with recent worsening retention despite Flomax initiation who has been staying at [facility] following his surgical repair and during which stay a Streeter catheter was inserted for retention, with trial of voiding failed even after initiation of Flomax and Streeter was reinserted on 6/3.  He presents with sudden onset of fever, shortness of breath and occasional cough.  In the emergency room he was found to be mildly hypoxemic and required O2 supplementation with white blood cell count minimally elevated at 11,600.  Sodium was low at 121.  Lactic acid was unremarkable.  UA was abnormal with large amounts of white blood cells present, concerning for catheter associated UTI.  Of note, the same concern had been occurring at the TCU environment but patient had not yet started the Bactrim that had been prescribed.  Decision was made to admit patient, placed on Rocephin for possible UTI, perform COVID testing and monitor patient closely.  Overnight his COVID testing has returned and is negative but patient is felt to be in not low suspicion and therefore has been continued on special precautions with plans to retest at 72 hours.  He has been weaned off O2 supplementation and feels his energy is much better today.  Today he does have a diffuse papular rash noted that seems to of progressively developed in the past 12 hours and was first noted approximately 2 and half hours after initiation of Rocephin.    Principal Problem:    Febrile illness, acute    Assessment: Etiology unclear with UA concerning for catheter associated urinary tract infection but not yet started on treatment on an  outpatient basis, COVID testing x1 negative and chest x-ray negative for any signs of pneumonia.  Patient has had no further fevers since his arrival to the hospital floor, procalcitonin is low risk for systemic infection    Plan: Given new rash, will transition patient from IV Rocephin to IV ciprofloxacin and continue to monitor urine culture for results with further transition to oral antibiotic as appropriate.  We will continue with special precautions and plan to recheck COVID testing at 72 hours unless patient significantly clinically improves and definitive etiology can be found.    Active Problems:    Urinary tract infection associated with indwelling urethral catheter (H)    Assessment: UA highly suspicious for urinary tract infection with large pyuria and bacteria present.  Urine culture is currently in process.  Patient has been started on IV Rocephin     Plan: Given concerns of a newly developed rash as above, will transition from IV Rocephin to IV Cipro.  Continue to monitor urine culture results closely for sensitivities and adjust antibiotics as appropriate.  Patient will need exchange of his Streeter catheter prior to discharge from the hospital      Suspected Covid-19 Virus Infection    Assessment: Fever, shortness of breath, cough in a patient without obvious pneumonia.  Initial COVID testing has been negative    Plan: We will continue with special precautions and plan to retest on 6/8/2020, 72 hours after the initial test unless other definitive etiology can be identified prior      Hypoxemia    Assessment: Noted in the TCU environment and continuing on evaluation in the emergency room, associated with fever and cough as above.  COVID testing x1 has been negative but patient continues on special precautions.  Chest x-ray shows no pneumonia.  Patient has been weaned off O2 supplementation since 130 this morning without any current hypoxemia noted    Plan: Continue to monitor for ongoing resolution,  continue special precautions and plan for repeat COVID testing at 72 hours as clinically appropriate.      Hyponatremia    Assessment: Present at baseline with sodiums normally in the upper 120 to low 130 range.  Sodium was 121 on presentation to the emergency room.  Had been receiving a diuretic recently which could contribute.  Patient has been getting IV fluid resuscitation and sodium has increased from 121 up to 125 in the last 18 hours    Plan: We will continue with normal saline at 125 cc an hour, continue to hold diuretic, and monitor sodium again at 1800 to ensure ongoing slow rise as appropriate.      Rash    Assessment: Developing since admission and first noted by the admission nurse on arrival to the hospital floor but had not been noted by either provider or any nurses in the emergency room prior to transfer.  Initially was only involving the abdomen and the lower back region but is now encompassing the majority of his body.  No itching or hives.  Rash is papular in nature but no pustular lesions are present    Plan: Although the timing would be atypical for a drug reaction rash to Rocephin, as this is the patient's only new medication will discontinue Rocephin and transition to Cipro for ongoing treatment of suspected UTI as above.  Given febrile illness of unclear etiology, may also be a viral exanthem.  Continue to monitor closely.      BPH with urinary retention    Assessment: BPH present at baseline with patient having increasing issues with retention in recent weeks despite Flomax being initiated.  Patient now has Streeter catheter in place with plans for outpatient urology follow-up    Plan: Continue with Streeter catheter for now.  Once organism is known and appropriate antibiotic has been given for over 24 hours, will need to replace Streeter catheter prior to discharge back to TCU.      Tobacco use disorder    Assessment: Chronic and stable, on nicotine replacement    Plan: Continue with nicotine patch  during this hospital stay and monitor      History of repair of hip fracture    Assessment: Patient status post hip fracture repair in May 2020 at this facility, has been recovering well and currently is at a TCU environment for ongoing strengthening    Plan: Continue supportive care.  As patient is feeling clinically better, will reinitiate PT and OT during this stay and plan for discharge back to TCU when patient is medically stable for discharge.1       Diet: Combination Diet Regular Diet Adult    DVT Prophylaxis: Pneumatic Compression Devices  Streeter Catheter: in place, indication: Other (Comment)(chronic)  Code Status: Full Code           Disposition Plan   Expected discharge: 2 - 4 days, recommended return to transitional care unit once adequate pain management/ tolerating PO medications, antibiotic plan established and repeat COVID testing performed if needed.  Entered: Amara Doss MD 06/06/2020, 12:40 PM       The patient's care was discussed with the Bedside Nurse, Care Coordinator/ and Patient.    Amara Doss MD  Hospitalist Service  [facility]    ______________________________________________________________________    Interval History   Patient has remained overall vitally stable.  Has not had a fever since initial temperature of 100.7  F in the emergency department, no tachycardia, blood pressures are stable.  Patient was weaned off O2 supplementation at 130 this morning and has no worsening hypoxemia or significant change in tachypnea.  Patient is very talkative, often tangential in his conversation.  Nursing did notice a small rash present in the abdomen and lower back region last evening upon arrival to the hospital floor but nursing staff this morning has noticed that the rash has expanded and now involves his entire body.  No additional new nursing concerns.  Patient's main concerns are on getting his medications not all that  one time but spread throughout the day as he normally does at home.  He has no new physical symptoms or additional concerns.    Data reviewed today: I reviewed all medications, new labs and imaging results over the last 24 hours.     Physical Exam   Vital Signs: Temp: 96.7  F (35.9  C) Temp src: Oral BP: 139/53 Pulse: 73 Heart Rate: 90 Resp: 22 SpO2: 96 % O2 Device: None (Room air) Oxygen Delivery: 1 LPM  Weight: 183 lbs 0 oz  Constitutional: awake, alert, cooperative, no apparent distress, and appears stated age  Respiratory: No increased work of breathing, good air exchange, clear to auscultation bilaterally, no crackles or wheezing  Cardiovascular: Normal apical impulse, regular rate and rhythm  GI: Bowel sounds present, abdomen soft and nontender without distention  Skin: Patient has diffuse papular rash that is noted throughout his body including face, neck, chest, abdomen, back, buttocks, bilateral arms and legs.  There is no evidence of excoriation associated with this rash  Musculoskeletal: no lower extremity pitting edema present  there is no redness, warmth, or swelling of the joints  full range of motion noted  Neurologic: Awake, alert, oriented to person, place, time, and overall to situation.  Patient is very conversational and tangential in his conversation.    Data   Recent Labs   Lab 06/06/20  1223 06/06/20  0541 06/05/20  1838   WBC  --  7.9 11.6*   HGB  --  12.6* 12.8*   MCV  --  89 86   PLT  --  258 309   * 124* 121*   POTASSIUM  --  3.8 4.0   CHLORIDE  --  93* 87*   CO2  --  24 25   BUN  --  21 20   CR  --  1.18 1.24   ANIONGAP  --  7 9   LAKISHA  --  7.9* 7.7*   GLC  --  90 124*   ALBUMIN  --   --  2.6*   PROTTOTAL  --   --  7.0   BILITOTAL  --   --  0.9   ALKPHOS  --   --  112   ALT  --   --  24   AST  --   --  23     Recent Results (from the past 24 hour(s))   XR Chest Port 1 View    Narrative    CHEST ONE VIEW  6/5/2020 6:12 PM     HISTORY: Shortness of breath. Cough.    COMPARISON:  2/17/2019.      Impression    IMPRESSION: Minimal interstitial changes at the bases similar to  previous. No definite acute infiltrates.    LEBRON BARRETT MD

## 2020-06-06 NOTE — ASSESSMENT & PLAN NOTE
Coming from the nursing home with fever and hypoxemia associated with shortness of breath since this morning  Has been tested for COVID-19 last time here on May 22 which was negative.  He states he is also been tested nursing home, those results not available  COVID-19 testing pending, at this time would consider him not low suspicion

## 2020-06-06 NOTE — PROVIDER NOTIFICATION
Dr. Herrera informed that covid is negative, will continue isolation and repeat covid in 72 hours.

## 2020-06-06 NOTE — ASSESSMENT & PLAN NOTE
Noted be hypoxemic at the nursing home and after removal of oxygen in the ED.  Associated with fever a known history of COPD, continues to smoke cigarettes  Chest x-ray showing no normalities  COVID-19 is a concern  Monitor oxygen, supplemental oxygen to maintain sats over 90%

## 2020-06-06 NOTE — H&P
[facility]    History and Physical - Hospitalist Service       Date of Admission:  6/5/2020    Assessment & Plan   Jerry Rodriguez is a 85 year old male admitted on 6/5/2020. He presents from [facility] with fever, shortness of breath since this morning.  At the nursing home he had been diagnosed with a catheter associated UTI and ordered to start Bactrim, although not sure if he is taken a dose.  In the emergency department he was febrile with a temperature of 100.7 and was noted to be mildly hypoxemic requiring supplemental oxygen.  He states his been short of breath, has had an occasional cough.  He was tested for COVID 19 in May prior to hip surgery and this was negative.  In the emergency department he is mildly hypoxemic at 85% on room air requiring supplemental oxygen.  White blood cell count slightly elevated 11,600 sodium slightly low at 121.  Lactate was normal 1.0.  Urinalysis was abnormal with elevated white blood cell count, urine culture pending.  Chest x-ray showing no acute process.  Patient be admitted to inpatient status for treatment of UTI associate with fever.  Of concern with hypoxemia whether he could be COVID positive, PCR testing is pending.  Placed in isolation, this point would consider him low suspicion for COVID-19 and will continue isolation to repeat testing at 72-hour.  He has been started on IV Rocephin and will continue at this point.  Expect him in hospital probably several days, likely back to TCU when stable    Febrile illness, acute  Presented to ER from TCU with a fever since this morning  Diagnosed with UTI and started on oral Bactrim for this, although not clear if he got a dose or not  Also mildly hypoxemic raising the concern of COVID-19  We will admit, treat for UTI, COVID-19 testing is pending  Tylenol, antibiotics ordered.    Hypoxemia  Noted be hypoxemic at the nursing home and after removal of oxygen in the ED.  Associated with  "fever a known history of COPD, continues to smoke cigarettes  Chest x-ray showing no normalities  COVID-19 is a concern  Monitor oxygen, supplemental oxygen to maintain sats over 90%    Suspected Covid-19 Virus Infection  Coming from the nursing home with fever and hypoxemia associated with shortness of breath since this morning  Has been tested for COVID-19 last time here on May 22 which was negative.  He states he is also been tested nursing home, those results not available  COVID-19 testing pending, at this time would consider him not low suspicion    Urinary tract infection associated with indwelling urethral catheter (H)  Has indwelling catheter since his hip surgery in May  Had abnormal urine analysis this morning, culture pending and started on oral Bactrim, not sure if he got a dose or not  Urine in the ED is abnormal, culture pending  We will continue IV Rocephin cultures pending    Hyponatremia  Has been noted to be mildly hyponatremic in the past  Sodium ED low at 121  Has been taking diuretic which could contribute  Hold diuretic, IV saline overnight and monitor sodium    Tobacco use disorder  Continues to smoke cigarettes,  Nicorette patch ordered.    History of repair of hip fracture  Recently hospitalized here in May for repair of hip fracture  Feeling better, will need ongoing PT and return to TCU for ongoing rehab         Diet: Regular  DVT Prophylaxis: Pneumatic Compression Devices  Streeter Catheter: not present  Code Status: Full code  Rule Out COVID-19 Handoff:  Jerry is NOT A LOW SUSPICION PUI (needs further investigation).    Follow these instructions:    If COVID test is positive -> continue isolation precautions    If 1st COVID test is negative -> continue isolation precautions    -  Order a repeat COVID test to be done 72 hours after the 1st test  -  Place \"PUI Isolation\" nurse communication order  -  Consider ID consult    If 2nd COVID test performed after 72 hours is negative -> consider " discontinuing COVID-specific isolation precautions if clinical course atypical for COVID and/or an alternative diagnosis emerges       Disposition Plan   Expected discharge: 2 - 3 days, recommended to prior living arrangement once antibiotic plan established, mental status at baseline and Improved sodium, COVID-19 testing negative.  Entered: Ned Herrera MD 06/05/2020, 8:51 PM     The patient's care was discussed with the Patient.    Ned Herrera MD  [facility]    ______________________________________________________________________    Chief Complaint   85-year-old male from nursing home with fever and hypoxemia    History is obtained from the patient, electronic health record and emergency department provider    History of Present Illness   Jerry Rodriguez is a 85 year old male who presents from TCU with concerns of shortness of breath and fever.  Recently hospitalized here at [facility] after a hip fracture and was transferred for to TCU in [place] for rehab.  Today noted to have increasing shortness of breath and a fever as high as 100.7.  Patient has an indwelling catheter related to urinary retention, urinalysis this morning was abnormal and he was started on oral Bactrim, although not sure if he had a dose or not.  As a day progressed became more short of air.  Says has had a slight cough chronic, he continues to smoke cigarettes does have a history of COPD in the past.  He denies nausea or vomiting, abdominal pain, change in stools, diarrhea.  He has indwelling catheter, but denies any blood.  He had negative COVID-19 testing prior to surgery in May.  States has been tested at the nursing home, most records are not available.    Review of Systems    The 10 point Review of Systems is negative other than noted in the HPI or here.     Past Medical History    I have reviewed this patient's medical history and updated it with pertinent information if needed.    Past Medical History:   Diagnosis Date     Anxiety state, unspecified      Arthritis      Hypertension      Hyponatremia      Inguinal hernia with obstruction, without mention of gangrene, unilateral or unspecified, (not specified as recurrent)     Strangulated right inguinal hernia years ago     Inguinal hernia without mention of obstruction or gangrene, unilateral or unspecified, (not specified as recurrent) 11/07/2000    Left inguinal hernia, sliding and not incarcerated     Other joint derangement, not elsewhere classified, forearm 1980    traumatic fracture     Pathologic fracture of neck of femur (H)     Surgery for hip fracture     Stricture and stenosis of esophagus 2004       Past Surgical History   I have reviewed this patient's surgical history and updated it with pertinent information if needed.  Past Surgical History:   Procedure Laterality Date     C OSTEOTOMY HIP/FEMUR  1980    traumatic fracture right hip     COLONOSCOPY  10/17/2007    Repeat Colonoscopy in 5 years for surveillance.     COLONOSCOPY N/A 1/24/2018    Procedure: COLONOSCOPY;;  Surgeon: Don Arambula MD;  Location:  GI     ESOPHAGOSCOPY, GASTROSCOPY, DUODENOSCOPY (EGD), COMBINED  1/4/2011    COMBINED ESOPHAGOSCOPY, GASTROSCOPY, DUODENOSCOPY (EGD), ESOPHAGEAL DILATION BALLOON LESS THAN 30MM performed by ELEN BUSBY at  GI     ESOPHAGOSCOPY, GASTROSCOPY, DUODENOSCOPY (EGD), COMBINED N/A 9/25/2015    Procedure: COMBINED ESOPHAGOSCOPY, GASTROSCOPY, DUODENOSCOPY (EGD), BIOPSY SINGLE OR MULTIPLE;  Surgeon: Markell Lawson MD;  Location:  GI     ESOPHAGOSCOPY, GASTROSCOPY, DUODENOSCOPY (EGD), COMBINED N/A 1/24/2018    Procedure: COMBINED ESOPHAGOSCOPY, GASTROSCOPY, DUODENOSCOPY (EGD), BIOPSY SINGLE OR MULTIPLE;;  Surgeon: Don Arambula MD;  Location:  GI     ESOPHAGOSCOPY, GASTROSCOPY, DUODENOSCOPY (EGD), DILATATION, COMBINED N/A 1/24/2018    Procedure: COMBINED ESOPHAGOSCOPY, GASTROSCOPY, DUODENOSCOPY (EGD),  DILATATION;  Esophagogastroduodenoscopy with Dilatation and Biopsy by Biopsy, Colonoscopy;  Surgeon: Don Arambula MD;  Location: [facility] GI     HC COLONOSCOPY THRU STOMA, DIAGNOSTIC  11/15/2000    Recurrent abdominal pain     HC COLONOSCOPY W/WO BRUSH/WASH  07/19/2004     HC REPAIR INCISIONAL HERNIA,REDUCIBLE  11/10/2000    Hernia Repair, Incisional, Unilateral, Left inguinal hernia and umbilical hernia     HC UGI ENDOSCOPY DIAG W OR W/O BRUSH/WASH  07/19/2004     HC UGI ENDOSCOPY DIAG W OR W/O BRUSH/WASH  05/24/2005    Peptic stricture of esophagus, dilated to 18 mm. Erosive reflux esophagitis. Hiatal hernia. Erosive duodenitis.      UGI ENDOSCOPY, SIMPLE EXAM  02/20/07     HC UGI ENDOSCOPY, SIMPLE EXAM  12/02/08    Schiatzky's ring, dialated, PPI indefinitely      UGI ENDOSCOPY, SIMPLE EXAM  12/15/2009    benign stricture with dialation     LAPAROSCOPIC APPENDECTOMY  09/15/2005     OPEN REDUCTION INTERNAL FIXATION HIP BIPOLAR Left 5/21/2020    Procedure: HEMIARTHROPLASTY, HIP, BIPOLAR;  Surgeon: Pola Rose MD;  Location: [facility] OR       Social History   I have reviewed this patient's social history and updated it with pertinent information if needed.  Social History     Tobacco Use     Smoking status: Current Every Day Smoker     Packs/day: 1.00     Years: 52.00     Pack years: 52.00     Types: Cigarettes     Smokeless tobacco: Current User     Types: Chew     Tobacco comment: occasionally/ 1.5tin qwk   Substance Use Topics     Alcohol use: No     Comment: quit 7-1980     Drug use: No       Family History   I have reviewed this patient's family history and updated it with pertinent information if needed.   Family History   Problem Relation Age of Onset     Cancer Sister         brain tumor     Diabetes Sister      Alzheimer Disease Sister        Prior to Admission Medications   Prior to Admission Medications   Prescriptions Last Dose Informant Patient Reported? Taking?   Spacer/Aero-Holding Chambers  (VALVED HOLDING CHAMBER) JULISSA   No Yes   Si Device 2 times daily To be used with flovent   acetaminophen (TYLENOL) 325 MG tablet 2020 at 0821  No Yes   Sig: Take 3 tablets (975 mg) by mouth every 8 hours as needed for mild pain or pain   albuterol (PROVENTIL) (2.5 MG/3ML) 0.083% neb solution 2020 at 2000  No Yes   Sig: Take 1 vial (2.5 mg) by nebulization every 4 hours as needed for wheezing   aspirin (ASA) 325 MG EC tablet 2020 at 1430  No Yes   Sig: Take 1 tablet (325 mg) by mouth 2 times daily (with meals)   busPIRone (BUSPAR) 5 MG tablet 2020 at 1000  No Yes   Sig: Take 1 tablet (5 mg) by mouth 2 times daily   fluticasone (FLONASE) 50 MCG/ACT nasal spray 2020 at 0800  Yes Yes   Sig: Spray 1 spray into both nostrils daily   fluticasone-vilanterol (BREO ELLIPTA) 100-25 MCG/INH inhaler 2020 at 2100  Yes Yes   Sig: Inhale 1 puff into the lungs daily   hydrochlorothiazide (HYDRODIURIL) 25 MG tablet 2020 at 0800  Yes Yes   Sig: Take 25 mg by mouth daily   multivitamin w/minerals (MULTI-VITAMIN) tablet 2020 at 0800  No Yes   Sig: Take 1 tablet by mouth daily   nicotine (NICODERM CQ) 21 MG/24HR 24 hr patch 2020 at 0800  No Yes   Sig: Place 1 patch onto the skin daily   omeprazole (PRILOSEC) 20 MG DR capsule 2020 at 1600  No Yes   Sig: Take 1 capsule (20 mg) by mouth 2 times daily (before meals)   ondansetron (ZOFRAN-ODT) 4 MG ODT tab Past Month at Unknown time  No Yes   Sig: Take 1 tablet (4 mg) by mouth every 8 hours as needed for nausea or vomiting   oxyCODONE (ROXICODONE) 5 MG tablet Past Month at Unknown time  No Yes   Sig: Take 1 tablet (5 mg) by mouth every 3 hours as needed for moderate to severe pain or severe pain   polyethylene glycol (MIRALAX) 17 g packet Past Month at Unknown time  No Yes   Sig: Take 17 g by mouth daily as needed for constipation (if no stool the day prior)   potassium chloride (KLOR-CON) 20 MEQ packet 2020 at 0800  Yes Yes   Sig: Take 20 mEq  by mouth daily   senna-docusate (SENOKOT-S/PERICOLACE) 8.6-50 MG tablet Past Month at Unknown time  No Yes   Sig: Take 2 tablets by mouth 2 times daily as needed for constipation (if no stool the day prior)   tamsulosin (FLOMAX) 0.4 MG capsule 6/4/2020 at 2100  Yes Yes   Sig: Take 0.4 mg by mouth At Bedtime   timolol maleate (TIMOPTIC) 0.5 % ophthalmic solution 6/5/2020 at 0800  No Yes   Sig: Apply 1 drop to eye 2 times daily   Patient taking differently: Place 2 drops into both eyes 2 times daily       Facility-Administered Medications: None     Allergies   Allergies   Allergen Reactions     No Known Drug Allergies        Physical Exam   Vital Signs: Temp: 98.5  F (36.9  C) Temp src: Oral BP: 129/60 Pulse: 85 Heart Rate: 90 Resp: 18 SpO2: 94 % O2 Device: Nasal cannula Oxygen Delivery: 2 LPM  Weight: 0 lbs 0 oz    Constitutional: awake, alert, cooperative, no apparent distress, and appears stated age  Eyes: Lids and lashes normal, pupils equal, round and reactive to light, extra ocular muscles intact, sclera clear, conjunctiva normal  ENT: Normocephalic, without obvious abnormality, atraumatic, sinuses nontender on palpation, external ears without lesions, oral pharynx with moist mucous membranes, tonsils without erythema or exudates, gums normal and good dentition.  Hematologic / Lymphatic: no cervical lymphadenopathy and no supraclavicular lymphadenopathy  Respiratory: No increased work of breathing, good air exchange, clear to auscultation bilaterally, no crackles or wheezing  Cardiovascular: regular rate and rhythm and no edema.  ER noted a murmur, I could not appreciated due to wearing a PAPR  GI: normal bowel sounds, soft, non-distended and non-tender  Skin: no bruising or bleeding, normal skin color, texture, turgor and no redness, warmth, or swelling  Musculoskeletal: no lower extremity pitting edema present  there is no redness, warmth, or swelling of the joints  full range of motion noted  Neurologic:  Awake, alert, oriented to name, place and time.  Cranial nerves II-XII are grossly intact.  Motor is 5 out of 5 bilaterally.     Data   Data reviewed today: I reviewed all medications, new labs and imaging results over the last 24 hours. I personally reviewed the chest x-ray image(s) showing No obvious infiltrate.    Results for orders placed or performed during the hospital encounter of 06/05/20 (from the past 24 hour(s))   XR Chest Port 1 View    Narrative    CHEST ONE VIEW  6/5/2020 6:12 PM     HISTORY: Shortness of breath. Cough.    COMPARISON: 2/17/2019.      Impression    IMPRESSION: Minimal interstitial changes at the bases similar to  previous. No definite acute infiltrates.    LEBRON BARRETT MD   CBC with platelets differential   Result Value Ref Range    WBC 11.6 (H) 4.0 - 11.0 10e9/L    RBC Count 4.35 (L) 4.4 - 5.9 10e12/L    Hemoglobin 12.8 (L) 13.3 - 17.7 g/dL    Hematocrit 37.4 (L) 40.0 - 53.0 %    MCV 86 78 - 100 fl    MCH 29.4 26.5 - 33.0 pg    MCHC 34.2 31.5 - 36.5 g/dL    RDW 12.6 10.0 - 15.0 %    Platelet Count 309 150 - 450 10e9/L    Diff Method Automated Method     % Neutrophils 93.3 %    % Lymphocytes 1.6 %    % Monocytes 3.8 %    % Eosinophils 0.1 %    % Basophils 0.2 %    % Immature Granulocytes 1.0 %    Nucleated RBCs 0 0 /100    Absolute Neutrophil 10.8 (H) 1.6 - 8.3 10e9/L    Absolute Lymphocytes 0.2 (L) 0.8 - 5.3 10e9/L    Absolute Monocytes 0.4 0.0 - 1.3 10e9/L    Absolute Basophils 0.0 0.0 - 0.2 10e9/L    Abs Immature Granulocytes 0.1 0 - 0.4 10e9/L    Absolute Nucleated RBC 0.0    Comprehensive metabolic panel   Result Value Ref Range    Sodium 121 (L) 133 - 144 mmol/L    Potassium 4.0 3.4 - 5.3 mmol/L    Chloride 87 (L) 94 - 109 mmol/L    Carbon Dioxide 25 20 - 32 mmol/L    Anion Gap 9 3 - 14 mmol/L    Glucose 124 (H) 70 - 99 mg/dL    Urea Nitrogen 20 7 - 30 mg/dL    Creatinine 1.24 0.66 - 1.25 mg/dL    GFR Estimate 53 (L) >60 mL/min/[1.73_m2]    GFR Estimate If Black 61 >60  mL/min/[1.73_m2]    Calcium 7.7 (L) 8.5 - 10.1 mg/dL    Bilirubin Total 0.9 0.2 - 1.3 mg/dL    Albumin 2.6 (L) 3.4 - 5.0 g/dL    Protein Total 7.0 6.8 - 8.8 g/dL    Alkaline Phosphatase 112 40 - 150 U/L    ALT 24 0 - 70 U/L    AST 23 0 - 45 U/L   Lactic acid whole blood   Result Value Ref Range    Lactic Acid 1.0 0.7 - 2.0 mmol/L   CRP inflammation   Result Value Ref Range    CRP Inflammation 70.9 (H) 0.0 - 8.0 mg/L   Nt probnp inpatient   Result Value Ref Range    N-Terminal Pro BNP Inpatient 697 0 - 1,800 pg/mL   UA with Microscopic   Result Value Ref Range    Color Urine Yellow     Appearance Urine Slightly Cloudy     Glucose Urine Negative NEG^Negative mg/dL    Bilirubin Urine Negative NEG^Negative    Ketones Urine Negative NEG^Negative mg/dL    Specific Gravity Urine 1.017 1.003 - 1.035    Blood Urine Large (A) NEG^Negative    pH Urine 5.0 5.0 - 7.0 pH    Protein Albumin Urine 100 (A) NEG^Negative mg/dL    Urobilinogen mg/dL 4.0 (H) 0.0 - 2.0 mg/dL    Nitrite Urine Positive (A) NEG^Negative    Leukocyte Esterase Urine Moderate (A) NEG^Negative    Source Catheterized Urine     WBC Urine 121 (H) 0 - 5 /HPF    RBC Urine >182 (H) 0 - 2 /HPF    Bacteria Urine Many (A) NEG^Negative /HPF    Mucous Urine Present (A) NEG^Negative /LPF

## 2020-06-06 NOTE — PLAN OF CARE
Vss. Afebrile. Pt denies pain. Streeter catheter in place from nursing home, urine is cloudy kehinde in color, 450mL output. Lungs are clear, no cough noted. O2 weaned to RA, current O2 sats 92-93%. Serum sodium level improved from 121 last evening to 124 this morning. First COVID test is negative, pt is high suspicion, next COVID test to be done Monday at 1830. Pt's wife, Estefania called nurse's station this morning and given update with pt status. Will continue with plan of care, monitor labs, IV fluids, IV abx.

## 2020-06-06 NOTE — PLAN OF CARE
Hospitalist was told during morning rounds and during her assessment of patient's rash to trunk and legs and was assisted in looking at the rash. Rash also present to his back. Antibiotic was changed from Rocephin to Levaquin. IV fluids continue. 375ml cloudy urine emptied from henley this shift. Ate small breakfast as did not have his upper dentures here. Soft foods were ordered for lunch, and pt ate 75% of the meal. Upper denture was brought in per family friend in green container. Repositioned every two hours. SCD's on. Room air sats of 95%. Watching TV and resting well. Bed alarm on. Will continue to monitor I&O, urine, rash, safety.

## 2020-06-06 NOTE — ASSESSMENT & PLAN NOTE
Recently hospitalized here in May for repair of hip fracture  Feeling better, will need ongoing PT and return to TCU for ongoing rehab

## 2020-06-06 NOTE — ASSESSMENT & PLAN NOTE
Presented to ER from TCU with a fever since this morning  Diagnosed with UTI and started on oral Bactrim for this, although not clear if he got a dose or not  Also mildly hypoxemic raising the concern of COVID-19  We will admit, treat for UTI, COVID-19 testing is pending  Tylenol, antibiotics ordered.

## 2020-06-07 LAB
ANION GAP SERPL CALCULATED.3IONS-SCNC: 7 MMOL/L (ref 3–14)
BACTERIA SPEC CULT: ABNORMAL
BUN SERPL-MCNC: 17 MG/DL (ref 7–30)
CALCIUM SERPL-MCNC: 7 MG/DL (ref 8.5–10.1)
CHLORIDE SERPL-SCNC: 98 MMOL/L (ref 94–109)
CO2 SERPL-SCNC: 22 MMOL/L (ref 20–32)
CREAT SERPL-MCNC: 1.07 MG/DL (ref 0.66–1.25)
CRP SERPL-MCNC: 58.9 MG/L (ref 0–8)
GFR SERPL CREATININE-BSD FRML MDRD: 63 ML/MIN/{1.73_M2}
GLUCOSE SERPL-MCNC: 80 MG/DL (ref 70–99)
HGB BLD-MCNC: 10.8 G/DL (ref 13.3–17.7)
HGB BLD-MCNC: 12.4 G/DL (ref 13.3–17.7)
Lab: ABNORMAL
POTASSIUM SERPL-SCNC: 3.4 MMOL/L (ref 3.4–5.3)
PROCALCITONIN SERPL-MCNC: 0.36 NG/ML
SODIUM SERPL-SCNC: 126 MMOL/L (ref 133–144)
SODIUM SERPL-SCNC: 127 MMOL/L (ref 133–144)
SODIUM SERPL-SCNC: 129 MMOL/L (ref 133–144)
SPECIMEN SOURCE: ABNORMAL
WBC # BLD AUTO: 4.8 10E9/L (ref 4–11)

## 2020-06-07 PROCEDURE — 85048 AUTOMATED LEUKOCYTE COUNT: CPT | Performed by: FAMILY MEDICINE

## 2020-06-07 PROCEDURE — 86140 C-REACTIVE PROTEIN: CPT | Performed by: FAMILY MEDICINE

## 2020-06-07 PROCEDURE — 87040 BLOOD CULTURE FOR BACTERIA: CPT | Performed by: FAMILY MEDICINE

## 2020-06-07 PROCEDURE — 25800030 ZZH RX IP 258 OP 636: Performed by: FAMILY MEDICINE

## 2020-06-07 PROCEDURE — 36415 COLL VENOUS BLD VENIPUNCTURE: CPT | Performed by: FAMILY MEDICINE

## 2020-06-07 PROCEDURE — 84295 ASSAY OF SERUM SODIUM: CPT | Performed by: FAMILY MEDICINE

## 2020-06-07 PROCEDURE — 99232 SBSQ HOSP IP/OBS MODERATE 35: CPT | Performed by: FAMILY MEDICINE

## 2020-06-07 PROCEDURE — 25000128 H RX IP 250 OP 636: Performed by: FAMILY MEDICINE

## 2020-06-07 PROCEDURE — 85018 HEMOGLOBIN: CPT | Performed by: FAMILY MEDICINE

## 2020-06-07 PROCEDURE — 84145 PROCALCITONIN (PCT): CPT | Performed by: FAMILY MEDICINE

## 2020-06-07 PROCEDURE — 12000000 ZZH R&B MED SURG/OB

## 2020-06-07 PROCEDURE — 80048 BASIC METABOLIC PNL TOTAL CA: CPT | Performed by: FAMILY MEDICINE

## 2020-06-07 PROCEDURE — 25000132 ZZH RX MED GY IP 250 OP 250 PS 637: Performed by: FAMILY MEDICINE

## 2020-06-07 RX ADMIN — ASPIRIN 325 MG: 325 TABLET, COATED ORAL at 08:59

## 2020-06-07 RX ADMIN — SODIUM CHLORIDE: 9 INJECTION, SOLUTION INTRAVENOUS at 04:31

## 2020-06-07 RX ADMIN — LEVOFLOXACIN 500 MG: 5 INJECTION, SOLUTION INTRAVENOUS at 12:27

## 2020-06-07 RX ADMIN — BUSPIRONE HYDROCHLORIDE 5 MG: 5 TABLET ORAL at 20:02

## 2020-06-07 RX ADMIN — FLUTICASONE FUROATE AND VILANTEROL TRIFENATATE 1 PUFF: 100; 25 POWDER RESPIRATORY (INHALATION) at 18:25

## 2020-06-07 RX ADMIN — BUSPIRONE HYDROCHLORIDE 5 MG: 5 TABLET ORAL at 09:00

## 2020-06-07 RX ADMIN — ACETAMINOPHEN 650 MG: 325 TABLET, FILM COATED ORAL at 01:40

## 2020-06-07 RX ADMIN — OMEPRAZOLE 20 MG: 20 CAPSULE, DELAYED RELEASE ORAL at 16:46

## 2020-06-07 RX ADMIN — ASPIRIN 325 MG: 325 TABLET, COATED ORAL at 18:08

## 2020-06-07 RX ADMIN — TIMOLOL MALEATE 2 DROP: 5 SOLUTION/ DROPS OPHTHALMIC at 20:03

## 2020-06-07 RX ADMIN — NICOTINE 1 PATCH: 21 PATCH TRANSDERMAL at 09:02

## 2020-06-07 RX ADMIN — OMEPRAZOLE 20 MG: 20 CAPSULE, DELAYED RELEASE ORAL at 05:47

## 2020-06-07 RX ADMIN — TAMSULOSIN HYDROCHLORIDE 0.4 MG: 0.4 CAPSULE ORAL at 20:02

## 2020-06-07 RX ADMIN — SODIUM CHLORIDE: 9 INJECTION, SOLUTION INTRAVENOUS at 15:14

## 2020-06-07 RX ADMIN — TIMOLOL MALEATE 2 DROP: 5 SOLUTION/ DROPS OPHTHALMIC at 09:03

## 2020-06-07 ASSESSMENT — ACTIVITIES OF DAILY LIVING (ADL)
ADLS_ACUITY_SCORE: 25
ADLS_ACUITY_SCORE: 23
ADLS_ACUITY_SCORE: 23
ADLS_ACUITY_SCORE: 25
ADLS_ACUITY_SCORE: 23
ADLS_ACUITY_SCORE: 23

## 2020-06-07 ASSESSMENT — MIFFLIN-ST. JEOR: SCORE: 1512.38

## 2020-06-07 NOTE — PLAN OF CARE
Patient is alert and oriented, very talkative tonight.    Temperature to 101.2, room was very hot and patient had many blankets piled on.  Temp at recheck was down to 99.8. Pt did received Tylenol.     LS are clear and diminished, he is on RA.     Rash appear to be increasing.  Abdomen, thighs, calves. Writer did not note on patient's back. MD was udpated.    Turned and repositioned for comfort.     Weight this AM is 83.7 kg, stable from yesterday.    Pink note left regarding Hemoglobin this am 10.8 compared to yesterday's 12.6

## 2020-06-07 NOTE — PROVIDER NOTIFICATION
Dr. Herrera informed that temp was 101.2 and pt got tylenol, pt has generalized rash.  No new orders at this time.

## 2020-06-07 NOTE — PROGRESS NOTES
Left a voicemail for admissions at [facility] to confirm bed hold.      GARCÍA Arthur  [facility] [phone]/ [place] [phone]

## 2020-06-07 NOTE — PLAN OF CARE
Vital signs:  Temp: 97.6  F (36.4  C) Temp src: Oral BP: 116/51 Pulse: 62 Heart Rate: 62 Resp: 18 SpO2: 95 % O2 Device: None (Room air)   A&Ox4. Pt denies any pain. Lungs are clear. Pt has infrequent cough. Chronic henley in place with good urine output. Pt had poor appetite for dinner. Repositioned q2hrs. IV NS running at 125ml/hr. Last sodium check 127. No complaints at this time. Will continue to monitor.

## 2020-06-07 NOTE — PLAN OF CARE
No fevers this shift. Some wheezing after getting back to bed after supper. Has dyspnea with activity. Was given Breo inhaler early. Pt and writer spoke with wife on the phone today. Writer also gave update to Anay waterman, and nurse at [facility]. Sodium was 126 this afternoon and is being drawn again now. Hemoglobin was 12.4. Urine output of 1975 this 12 hour shift and urine is clearer. Ate about 50% of meals today---breakfast and lunch were eaten while up in the chair. Transferred with walker, gait belt, and two assists. Has denied pain. Dressing to left hip is CDI. Rash remains prominent on anterior trunk with some on legs and arms and lightly on back. Pt states it does not itch or bother him. Continuous pulse oximeter showing mostly low 90's. Bed and chair alarms on. Will have second Covid test tomorrow evening. Will continue to monitor labs, temps, I&O, activity tolerance, sats, rash, safety.

## 2020-06-08 ENCOUNTER — APPOINTMENT (OUTPATIENT)
Dept: PHYSICAL THERAPY | Facility: CLINIC | Age: 85
DRG: 698 | End: 2020-06-08
Attending: FAMILY MEDICINE
Payer: COMMERCIAL

## 2020-06-08 ENCOUNTER — APPOINTMENT (OUTPATIENT)
Dept: OCCUPATIONAL THERAPY | Facility: CLINIC | Age: 85
DRG: 698 | End: 2020-06-08
Attending: FAMILY MEDICINE
Payer: COMMERCIAL

## 2020-06-08 LAB
ANION GAP SERPL CALCULATED.3IONS-SCNC: 7 MMOL/L (ref 3–14)
BUN SERPL-MCNC: 12 MG/DL (ref 7–30)
CALCIUM SERPL-MCNC: 7.3 MG/DL (ref 8.5–10.1)
CHLORIDE SERPL-SCNC: 99 MMOL/L (ref 94–109)
CO2 SERPL-SCNC: 22 MMOL/L (ref 20–32)
CREAT SERPL-MCNC: 0.96 MG/DL (ref 0.66–1.25)
CRP SERPL-MCNC: 53.7 MG/L (ref 0–8)
ERYTHROCYTE [DISTWIDTH] IN BLOOD BY AUTOMATED COUNT: 12.7 % (ref 10–15)
GFR SERPL CREATININE-BSD FRML MDRD: 72 ML/MIN/{1.73_M2}
GLUCOSE SERPL-MCNC: 82 MG/DL (ref 70–99)
HCT VFR BLD AUTO: 35.2 % (ref 40–53)
HGB BLD-MCNC: 11.9 G/DL (ref 13.3–17.7)
MCH RBC QN AUTO: 29.2 PG (ref 26.5–33)
MCHC RBC AUTO-ENTMCNC: 33.8 G/DL (ref 31.5–36.5)
MCV RBC AUTO: 87 FL (ref 78–100)
PLATELET # BLD AUTO: 235 10E9/L (ref 150–450)
POTASSIUM SERPL-SCNC: 3.4 MMOL/L (ref 3.4–5.3)
PROCALCITONIN SERPL-MCNC: 0.22 NG/ML
RBC # BLD AUTO: 4.07 10E12/L (ref 4.4–5.9)
SODIUM SERPL-SCNC: 128 MMOL/L (ref 133–144)
WBC # BLD AUTO: 4.1 10E9/L (ref 4–11)

## 2020-06-08 PROCEDURE — 25000132 ZZH RX MED GY IP 250 OP 250 PS 637: Performed by: FAMILY MEDICINE

## 2020-06-08 PROCEDURE — 36415 COLL VENOUS BLD VENIPUNCTURE: CPT | Performed by: FAMILY MEDICINE

## 2020-06-08 PROCEDURE — 84145 PROCALCITONIN (PCT): CPT | Performed by: FAMILY MEDICINE

## 2020-06-08 PROCEDURE — 87635 SARS-COV-2 COVID-19 AMP PRB: CPT | Performed by: FAMILY MEDICINE

## 2020-06-08 PROCEDURE — 12000000 ZZH R&B MED SURG/OB

## 2020-06-08 PROCEDURE — 97110 THERAPEUTIC EXERCISES: CPT | Mod: GO | Performed by: OCCUPATIONAL THERAPIST

## 2020-06-08 PROCEDURE — 97166 OT EVAL MOD COMPLEX 45 MIN: CPT | Mod: GO | Performed by: OCCUPATIONAL THERAPIST

## 2020-06-08 PROCEDURE — 97110 THERAPEUTIC EXERCISES: CPT | Mod: GP | Performed by: PHYSICAL THERAPIST

## 2020-06-08 PROCEDURE — 85027 COMPLETE CBC AUTOMATED: CPT | Performed by: FAMILY MEDICINE

## 2020-06-08 PROCEDURE — 97161 PT EVAL LOW COMPLEX 20 MIN: CPT | Mod: GP | Performed by: PHYSICAL THERAPIST

## 2020-06-08 PROCEDURE — 97530 THERAPEUTIC ACTIVITIES: CPT | Mod: GP | Performed by: PHYSICAL THERAPIST

## 2020-06-08 PROCEDURE — 86140 C-REACTIVE PROTEIN: CPT | Performed by: FAMILY MEDICINE

## 2020-06-08 PROCEDURE — 99232 SBSQ HOSP IP/OBS MODERATE 35: CPT | Performed by: FAMILY MEDICINE

## 2020-06-08 PROCEDURE — 80048 BASIC METABOLIC PNL TOTAL CA: CPT | Performed by: FAMILY MEDICINE

## 2020-06-08 PROCEDURE — 25800030 ZZH RX IP 258 OP 636: Performed by: FAMILY MEDICINE

## 2020-06-08 RX ORDER — LEVOFLOXACIN 500 MG/1
500 TABLET, FILM COATED ORAL DAILY
Status: DISCONTINUED | OUTPATIENT
Start: 2020-06-08 | End: 2020-06-09 | Stop reason: HOSPADM

## 2020-06-08 RX ADMIN — OMEPRAZOLE 20 MG: 20 CAPSULE, DELAYED RELEASE ORAL at 06:25

## 2020-06-08 RX ADMIN — TAMSULOSIN HYDROCHLORIDE 0.4 MG: 0.4 CAPSULE ORAL at 22:00

## 2020-06-08 RX ADMIN — LEVOFLOXACIN 500 MG: 500 TABLET, FILM COATED ORAL at 09:43

## 2020-06-08 RX ADMIN — ASPIRIN 325 MG: 325 TABLET, COATED ORAL at 19:11

## 2020-06-08 RX ADMIN — ASPIRIN 325 MG: 325 TABLET, COATED ORAL at 09:43

## 2020-06-08 RX ADMIN — TIMOLOL MALEATE 2 DROP: 5 SOLUTION/ DROPS OPHTHALMIC at 22:03

## 2020-06-08 RX ADMIN — BUSPIRONE HYDROCHLORIDE 5 MG: 5 TABLET ORAL at 21:59

## 2020-06-08 RX ADMIN — BUSPIRONE HYDROCHLORIDE 5 MG: 5 TABLET ORAL at 09:43

## 2020-06-08 RX ADMIN — OMEPRAZOLE 20 MG: 20 CAPSULE, DELAYED RELEASE ORAL at 16:46

## 2020-06-08 RX ADMIN — FLUTICASONE FUROATE AND VILANTEROL TRIFENATATE 1 PUFF: 100; 25 POWDER RESPIRATORY (INHALATION) at 22:02

## 2020-06-08 RX ADMIN — NICOTINE 1 PATCH: 21 PATCH TRANSDERMAL at 09:43

## 2020-06-08 RX ADMIN — ACETAMINOPHEN 650 MG: 325 TABLET, FILM COATED ORAL at 21:59

## 2020-06-08 RX ADMIN — SODIUM CHLORIDE: 9 INJECTION, SOLUTION INTRAVENOUS at 00:50

## 2020-06-08 ASSESSMENT — ACTIVITIES OF DAILY LIVING (ADL)
PREVIOUS_RESPONSIBILITIES: HOUSEKEEPING;SHOPPING
ADLS_ACUITY_SCORE: 23
ADLS_ACUITY_SCORE: 23
ADLS_ACUITY_SCORE: 22
ADLS_ACUITY_SCORE: 22
ADLS_ACUITY_SCORE: 23
ADLS_ACUITY_SCORE: 22

## 2020-06-08 ASSESSMENT — MIFFLIN-ST. JEOR
SCORE: 1545.38
SCORE: 1510

## 2020-06-08 NOTE — PROGRESS NOTES
[facility]    Medicine Progress Note - Hospitalist Service       Date of Admission:  6/5/2020  Assessment & Plan     Patient is an 85-year-old gentleman with a past medical history of recent hip surgery repair following fracture, chronic hyponatremia (normally mild in the upper 120 to low 130 range), and BPH with recent worsening retention despite Flomax initiation who has been staying at [facility] following his surgical repair and during which stay a Streeter catheter was inserted for retention, with trial of voiding failed even after initiation of Flomax and Streeter was reinserted on 6/3.  He presents with sudden onset of fever, shortness of breath and occasional cough.  In the emergency room he was found to be mildly hypoxemic and required O2 supplementation with white blood cell count minimally elevated at 11,600.  Sodium was low at 121.  Lactic acid was unremarkable.  UA was abnormal with large amounts of white blood cells present, concerning for catheter associated UTI.  Of note, the same concern had been occurring at the TCU environment but patient had not yet started the Bactrim that had been prescribed.  Decision was made to admit patient, placed on Rocephin for possible UTI, perform COVID testing and monitor patient closely.      Since admission his COVID testing has returned and is negative but patient is felt to be in not low suspicion and therefore has been continued on special precautions with plans to retest at 72 hours.  On evening of admission he had the start of a rash on his abdomen and flank which by the morning of 6/6/2020 was a diffuse papular rash noted throughout his body.  Rocephin has been discontinued and IV Levaquin started on 6/6/2020.  Procalcitonin was initially low risk for systemic infection but repeat has increased to 0.43 on redraw on 6/6/2020 - retesting since has been trending downward appropriately.  Patient has now been afebrile for over 24 hours  with other vitals stable. Urine culture is now growing out Enterbacter cloacae with suspectibilities showing Levaquin sensitivity.  Remainder of cultures show no growth to date.  Repeat COVID testing to occur this evening at 72 hours.      Principal Problem:    Febrile illness    Assessment: now appears secondary to catheter associated urinary tract infection, COVID testing x1 negative and chest x-ray negative for any signs of pneumonia. Urine culture is growing enterobacter cloacae.  Procalcitonin increasing in first 24 hours of hospitalization but subsequently decreasing.  Patient initially placed on Rocephin but transitioned to IV Levaquin on 6/6/2020 after rash development.  Now afebrile for over 24 hours.     Plan: Transition to PO Levaquin and monitor for ongoing improvement.  Continue with special precautions and plan to recheck COVID testing at 72 hours (this evening).      Active Problems:    Urinary tract infection associated with indwelling urethral catheter (H)    Assessment:  Urine culture growing enterobacter cloacae with sensitivities known and sensitive to Levaquin being given    Plan: change to PO Levaquin today, change henley catheter today.       Suspected Covid-19 Virus Infection    Assessment: Fever, shortness of breath, cough in a patient without obvious pneumonia.  Initial COVID testing has been negative and respiratory symptoms are improvement    Plan: We will continue with special precautions and retest this evening at 72 hours after the initial test       Hypoxemia    Assessment: Noted in the TCU environment and continuing on evaluation in the emergency room, associated with fever and cough as above.  COVID testing x1 has been negative but patient continues on special precautions.  Chest x-ray shows no pneumonia.  Patient has been weaned off O2 supplementation over the past 48 hours     Plan: Continue to monitor for ongoing resolution, continue special precautions and plan for repeat COVID  testing at 72 hours as clinically appropriate.      Hyponatremia    Assessment: Present at baseline with sodiums normally in the upper 120 to low 130 range. Patient reports he has not liked fluid restrictions or sodium tablets that have been given to him in the past and normally feels fine when his sodium is in the upper 120 range. Sodium was 121 on presentation to the emergency room.  Had been receiving a diuretic recently which could contribute.  Patient has been getting IV fluid resuscitation and has had slow increase of his sodium to the 127-129 range.      Plan:  Will discontinue IV fluids and monitor for sodium stability       Rash      Assessment: Developing since admission and first noted by the admission nurse on arrival to the hospital floor but had not been noted by either provider or any nurses in the emergency room prior to transfer.  Initially was only involving the abdomen and the lower back region but progressed to encompass the majority of his body.  No itching or hives.  Rash is papular in nature but no pustular lesions are present.  Although the timing would be atypical for a drug reaction rash to Rocephin, as this is the patient's only new medication it was discontinue Rocephin and patient transition to IV Levaquin for ongoing treatment of suspected UTI as above.  Given febrile illness of unclear etiology, may also be a viral exanthem.  Has now appeared to stabilize.      Plan:   Continue to monitor closely for ongoing stability.      BPH with urinary retention    Assessment: BPH present at baseline with patient having increasing issues with retention in recent weeks despite Flomax being initiated.  Patient now has Streeter catheter in place with plans for outpatient urology follow-up    Plan: Continue with Streeter catheter for now -Streeter will be replaced today given current infection      Tobacco use disorder    Assessment: Chronic and stable, on nicotine replacement    Plan: Continue with nicotine  patch during this hospital stay and monitor      History of repair of hip fracture    Assessment: Patient status post hip fracture repair in May 2020 at this facility, has been recovering well and currently is at a TCU environment for ongoing strengthening    Plan: Continue supportive care  With physical therapy and Occupational Therapy evaluations today.         Diet: Combination Diet Regular Diet Adult    DVT Prophylaxis: Pneumatic Compression Devices  Streeter Catheter: in place, indication: Other (Comment)(chronic)  Code Status: Full Code           Disposition Plan   Expected discharge: 1 to 2 days, recommended to transitional care unit once adequate pain management/ tolerating PO medications, antibiotic plan established and safe disposition plan/ TCU bed available.  Entered: Amara Doss MD 06/08/2020, 7:18 AM       The patient's care was discussed with the Bedside Nurse, Care Coordinator/, Patient and Patient's Family.    Amara Doss MD  Hospitalist Service  [facility]    ______________________________________________________________________    Interval History   Patient has remained afebrile for over 24 hours, continues to need no O2 supplementation.  His energy and appetite are improving and patient is more jovial and back to his baseline mentation.  Patient denies any new symptoms.  He reports the rash still does not hurt or itch and feels fine.  No new nursing concerns.    Data reviewed today: I reviewed all medications, new labs and imaging results over the last 24 hours.    Physical Exam   Vital Signs: Temp: 99.1  F (37.3  C) Temp src: Oral BP: (!) 169/79 Pulse: 75   Resp: 18 SpO2: 94 % O2 Device: None (Room air) Oxygen Delivery: 1 LPM  Weight: 191 lbs 12.8 oz  Constitutional: awake, alert, cooperative, no apparent distress, and appears stated age  Respiratory: No increased work of breathing, good air exchange, clear to auscultation  bilaterally, no crackles or wheezing  Cardiovascular: Regular rate and rhythm without murmur  GI: Bowel sounds present, abdomen is  obese but soft and nondistended, no tenderness  Skin: Patient continues to have papular rash throughout his body extending from his face to his feet which continues to be a slightly darker red than it was initially but overall unchanged from yesterday, no hives, no pustules, no evidence of excoriation  Musculoskeletal: no lower extremity pitting edema present  Neurologic: Awake, alert, oriented to person, place, year, and somewhat to situation but continues to be very tangential and difficult to get to focus on current conversation.  Patient does seem to have difficulty retaining new information at times but unclear if this is secondary to cognitive or memory impairment or inattention    Data   Recent Labs   Lab 06/08/20  0635 06/07/20  1815 06/07/20  1204 06/07/20  0551  06/06/20  0541 06/05/20  1838   WBC 4.1  --   --  4.8  --  7.9 11.6*   HGB 11.9*  --  12.4* 10.8*  --  12.6* 12.8*   MCV 87  --   --   --   --  89 86     --   --   --   --  258 309   * 129* 126* 127*   < > 124* 121*   POTASSIUM 3.4  --   --  3.4  --  3.8 4.0   CHLORIDE 99  --   --  98  --  93* 87*   CO2 22  --   --  22  --  24 25   BUN 12  --   --  17  --  21 20   CR 0.96  --   --  1.07  --  1.18 1.24   ANIONGAP 7  --   --  7  --  7 9   LAKISHA 7.3*  --   --  7.0*  --  7.9* 7.7*   GLC 82  --   --  80  --  90 124*   ALBUMIN  --   --   --   --   --   --  2.6*   PROTTOTAL  --   --   --   --   --   --  7.0   BILITOTAL  --   --   --   --   --   --  0.9   ALKPHOS  --   --   --   --   --   --  112   ALT  --   --   --   --   --   --  24   AST  --   --   --   --   --   --  23    < > = values in this interval not displayed.

## 2020-06-08 NOTE — PLAN OF CARE
Discharge Planner OT   Patient plan for discharge: Pt would like to return to home  Current status: The patient is an 86 y/o male who sustained a fall resulting with a hip repair last month.  He had been at a TCU for recovery.  Currently sepsis treatment.  PUI for COVID 19, temperatures off/on ongoing per EPIC chart review.  Decreased ability to preform daily activities.  This date effort to complete minimal bilateral UE AROM therapeutic exercise and required rest break after 5 reps, seated in chair.  Moderate assist to complete doff/adelia sock and pt denied use of adaptive equipment.  He is motivated for therapy endurance training.  Pt is hard of hearing.  Cognitive status: Rambling speech and perserverates on subjects from the past; had to cue to complete OT tasks.  Due to poor attention this date.  OT will complete SLUMS tommorrow  Barriers to return to prior living situation: cognitive decline, poor endurance for daily self cares  Recommendations for discharge: TCU or HHC with HHOT/PT services  Rationale for recommendations: Pt desires to return home with home health services, however if he is unable to complete stairs and ambulation with distance with PT evaluation; he would be safer to be dichange to TCU.  OT home health services recommended if he denies TCU for improve safety and endurance for daily tasks/activities and functional independence.       Entered by: Janelle Leos 06/08/2020 10:30 AM

## 2020-06-08 NOTE — PLAN OF CARE
Discharge Planner PT   Patient plan for discharge:TCU  Current status: Min assist supine to sit, CGA sit to stand and amb with FWW  20 feet in room. Not able to do left straight leg raise in supine.  Barriers to return to prior living situation: weakness, fatigue, martinez issues, imbalance/fall hx  Recommendations for discharge: TCU  Rationale for recommendations: not back to prior level, weakness, not back to level needed to be alone with his wife at a farm house       Entered by: Javier Warren 06/08/2020 1:00 PM

## 2020-06-08 NOTE — PROGRESS NOTES
Occupational Therapy Evaluation       06/08/20 0815   Quick Adds   Type of Visit Initial Occupational Therapy Evaluation   Living Environment   Lives With spouse   Living Arrangements house   Home Accessibility stairs to enter home   Number of Stairs, Main Entrance 10   Transportation Anticipated family or friend will provide   Living Environment Comment Lives in a farmhouse and everything has been modified to have one level living space. Walk in shower with grab bars reported.  Pt is concerned with climbing stauirs to enter his home, due to weakness.   Self-Care   Usual Activity Tolerance fair   Current Activity Tolerance fair   Activity/Exercise/Self-Care Comment Has FWW available (wife uses)  Pt reported while in the TCU , At TCU, OT trialed education and use with dressing equipment, but pt does not desire to use AE for lower body dressing.   Functional Level   Ambulation 1-->assistive equipment   Dressing 0-->independent   Eating 0-->independent   Communication 2-->difficulty understanding (not related to language barrier)   Swallowing 0-->swallows foods/liquids without difficulty   Cognition 1 - attention or memory deficits   Fall history within last six months yes   Number of times patient has fallen within last six months 1   Which of the above functional risks had a recent onset or change? ambulation   General Information   Onset of Illness/Injury or Date of Surgery - Date 06/05/20   Referring Physician Dr Amara Doss   Patient/Family Goals Statement Pt would like to go home with home health care including OT home health services,.   Additional Occupational Profile Info/Pertinent History of Current Problem The patient is an 86 y/o male who sustained a fall resulting with a hip repair last month.  He had been at a TCU for recovery.  Currently sepsis treatment.  PUI for COVID 19, temperatures off/on ongoing per EPIC chart review.  Decreased ability to preform daily activities.   Precautions/Limitations  "fall precautions  (COVID 19 PUI)   General Observations Pt agreeable for OT eval and treatment.   General Info Comments OT order received: \"generaluzed weakness and deconditioning after sepsis with recent hip fracture last month, and repair and at TCU prior admission.\"   Cognitive Status Examination   Orientation person;place   Level of Consciousness alert   Follows Commands (Cognition) follows one step commands;50-74% accuracy   Memory impaired   Attention Distractible during evaluation;Quiet environment required;Sustained attention impaired;Divided attention impaired, difficulty with simultaneous tasks   Organization/Problem Solving Problem solving impaired;Prioritizing of information/tasks impaired   Executive Function Self awareness/monitoring impaired;Cognitive flexibility impaired   Cognitive Comment Rambling speech and perserverates on subjects from the past; had to cue to complete OT tasks.  OT will complete SLUMS tommorrow.   Visual Perception   Visual Perception Wears glasses  (reading only)   Sensory Examination   Sensory Comments Hard of hearing   Posture   Posture forward head position   Range of Motion (ROM)   ROM Comment Limited Bilateral UE AROM; shoulders and elbows. due to previous injury and probable arthritis.  Right shoulder flexion est. 120 degrees and left shoulder flexion est. 70 degrees.   Strength   Strength Comments Bilateral UE MMT: right -4/5 and left 3+/5.  grasp -4/5 bilaterally.   Coordination   Functional Limitations Decreased speed;Impaired ability to perform bilateral tasks   Transfer Skills   Transfer Comments Defer to PT   Bathing   Level of [place] maximum assist (25% patients effort)   Upper Body Dressing   Level of [place]: Dress Upper Body moderate assist (50% patients effort)   Lower Body Dressing   Level of [place]: Dress Lower Body moderate assist (50% patients effort)   Toileting   Level of [place]: Toilet maximum assist (25% patients effort) " "  Grooming   Level of [place]: Grooming independent   Eating/Self Feeding   Level of [place]: Eating independent   Instrumental Activities of Daily Living (IADL)   Previous Responsibilities housekeeping;shopping   IADL Comments Family assists with IADLs and driving to appointments   General Therapy Interventions   Planned Therapy Interventions ADL retraining;bed mobility training;strengthening;ROM;transfer training   Clinical Impression   Criteria for Skilled Therapeutic Interventions Met yes, treatment indicated   OT Diagnosis Decreased endurance for self cares and daily activities.   Influenced by the following impairments cogntive decline, UE AROM/strength/coordination , overall endurance   Assessment of Occupational Performance 5 or more Performance Deficits   Identified Performance Deficits dressing, toilet hygiene/transfer, bathing, home mgmt, medication s/u, yard work   Clinical Decision Making (Complexity) Moderate complexity   Therapy Frequency Daily   Predicted Duration of Therapy Intervention (days/wks) 2-3   Anticipated Discharge Disposition Transitional Care Facility;Home with Home Therapy   Risks and Benefits of Treatment have been explained. Yes   Patient, Family & other staff in agreement with plan of care Yes   Clinical Impression Comments Pt desires to return home with home health services, however if he is unable to complete stairs and ambulation with distance with PT evaluation; he would be safer to be dichange to TCU.  OT home health services recommended if he denies TCU stay.   [facility] [employer] TM \"6 Clicks\"   2016, Trustees of [facility], under license to [employer].  All rights reserved.   6 Clicks Short Forms Daily Activity Inpatient Short Form   [facility] AM-PAC  \"6 Clicks\" Daily Activity Inpatient Short Form   1. Putting on and taking off regular lower body clothing? 2 - A Lot   2. Bathing (including washing, rinsing, drying)? 2 - A Lot   3. Toileting, " which includes using toilet, bedpan or urinal? 2 - A Lot   4. Putting on and taking off regular upper body clothing? 3 - A Little   5. Taking care of personal grooming such as brushing teeth? 3 - A Little   6. Eating meals? 4 - None   Daily Activity Raw Score (Score out of 24.Lower scores equate to lower levels of function) 16   Total Evaluation Time   Total Evaluation Time (Minutes) 20       ZARIA Draper/L  [facility]  [phone]

## 2020-06-08 NOTE — PROGRESS NOTES
06/08/20 1200   Quick Adds   Type of Visit Initial PT Evaluation   Living Environment   Lives With spouse   Living Arrangements house   Home Accessibility stairs to enter home   Transportation Anticipated family or friend will provide   Self-Care   Usual Activity Tolerance fair   Current Activity Tolerance fair   Activity/Exercise/Self-Care Comment pt active with yardwork but not a formal exerciser   Functional Level Prior   Ambulation 1-->assistive equipment   Communication 2-->difficulty understanding (not related to language barrier)   Cognition 1 - attention or memory deficits   Number of times patient has fallen within last six months 1   Which of the above functional risks had a recent onset or change? ambulation   General Information   Onset of Illness/Injury or Date of Surgery - Date 06/05/20   Patient/Family Goals Statement go back home   Pertinent History of Current Problem (include personal factors and/or comorbidities that impact the POC) 5/20/20 tripped on uneven ground going outside/garage and had L hip yeny arthroplasty on 5/21/20 and was DC'd to TCU and returned on 6/5/20 with a fever, UTI, possible COVID. PT order for generalized weakness/deconditioning after sepsis; recent hip fracture last month s/p repair and at TCU prior to admission   Precautions/Limitations fall precautions   Weight-Bearing Status - LLE weight-bearing as tolerated   General Observations pt was sleeping upon PT arrival at 1130, has urinary catheter.   General Info Comments COVID precautions   Cognitive Status Examination   Orientation person;place   Level of Consciousness alert;confused   Follows Commands and Answers Questions 75% of the time   Personal Safety and Judgment at risk behaviors demonstrated   Memory impaired   Pain Assessment   Patient Currently in Pain No   Integumentary/Edema   Integumentary/Edema Comments pt with B ccmpression pumps on lower legs for swelling/edema   Posture    Posture Forward head  position;Kyphosis   Range of Motion (ROM)   ROM Comment pt not able to elevation shoulders to 90 degrees, L more limited than R. LE's wfl   Strength   Strength Comments not able to do L SLR, L quad/hip weakness, able to do L hip abd/add supine, not tested against gravity, able to do R SLR in bed   Bed Mobility   Bed Mobility Comments used trapeze to scoot up in bed, cues needed to get centered in bed   Transfer Skill:  Sit to Stand   Level of [place]: Sit/Stand contact guard   Physical Assist/Nonphysical Assist: Sit/Stand 1 person assist;verbal cues   Weightbearing Restrictions: Sit/Stand weight-bearing as tolerated   Assistive Device for Transfer: Sit/Stand rolling walker   Transfer Skill: Stand to Sit   Level of [place]: Stand/Sit contact guard   Physical Assist/Nonphysical Assist: Stand/Sit verbal cues;1 person assist   Weight-Bearing Restrictions: Stand/Sit weight-bearing as tolerated   Gait   Gait Comments FWW L LE WBAT, trunk flexed, step through patttern, slow gait, tires easily   Balance   Balance Comments poor dynamic standing balance, fair sitting and standing static balance   General Therapy Interventions   Planned Therapy Interventions ROM;strengthening;gait training;balance training;transfer training   Clinical Impression   Criteria for Skilled Therapeutic Intervention yes, treatment indicated   PT Diagnosis general muscle weakness greatest in L hip, gait and balance impairments   Influenced by the following impairments weakness, ROM, balance   Functional limitations due to impairments general mobility, transfers, amb, adl's   Clinical Presentation Stable/Uncomplicated   Clinical Decision Making (Complexity) Low complexity   Therapy Frequency Daily   Predicted Duration of Therapy Intervention (days/wks) 3 days   Anticipated Discharge Disposition Transitional Care Facility   Risk & Benefits of therapy have been explained Yes   Patient, Family & other staff in agreement with plan of care Yes  "  Clinical Impression Comments 85 y.o. male who was readmitted and was seen previously following L hip fx/yeny arthroplasty. Pt weak and tires easiily, not back to prior level, would benefit from TCU stay   [facility] AM-PAC TM \"6 Clicks\"   2016, Trustees of [facility], under license to [employer].  All rights reserved.   6 Clicks Short Forms Basic Mobility Inpatient Short Form   [facility] AM-PAC  \"6 Clicks\" V.2 Basic Mobility Inpatient Short Form   1. Turning from your back to your side while in a flat bed without using bedrails? 3 - A Little   2. Moving from lying on your back to sitting on the side of a flat bed without using bedrails? 3 - A Little   3. Moving to and from a bed to a chair (including a wheelchair)? 3 - A Little   4. Standing up from a chair using your arms (e.g., wheelchair, or bedside chair)? 3 - A Little   5. To walk in hospital room? 3 - A Little   6. Climbing 3-5 steps with a railing? 3 - A Little   Basic Mobility Raw Score (Score out of 24.Lower scores equate to lower levels of function) 18   Total Evaluation Time   Total Evaluation Time (Minutes) 15     "

## 2020-06-08 NOTE — PROGRESS NOTES
Writer talked with Monika, at [facility].  She states that family gave up patient's bed hold, today, however, family wants him to return to [facility] at discharge.  Renetta states new referral will need to be sent and also new prior auth to [employer].  Discussed with Renetta, family's concern regarding therapy at the TCU.  Renetta states patient is getting 5-6 days of therapy each week.    Writer talked with Anay, patient's great-niece, at Estefania's request.  Anay states that they gave up the bed hold at [facility] (Admissions: [phone], Main Phone: [phone] Fax: [phone]), as they were not aware of the private cost to hold the bed.  Anay states they would still like patient to return to [facility] at discharge.  New referral sent.  Discussed transportation with Anay.  She is requesting van transport be arranged at discharge.  Transport company can mail or email bill to Anay.    New prior auth initiated with [facility] Medicare Advantage [place]  [phone] Phone [phone] Fax.    GARCÍA Arthur  [facility] [phone]/ [place] [phone]

## 2020-06-08 NOTE — PROGRESS NOTES
Patient vitally stable, denies pain.  He is currently on RA, denies SOA does not appear SOA, LS do have faint expiratory wheezes.  Turned and repositioned.   Skin rash appears less pink. Incision is healing, dressing is intact. Turned and repositioned through the night. He appears weak and feels weeks.  Pink note left regarding possible PT for patient.

## 2020-06-09 VITALS
HEART RATE: 76 BPM | WEIGHT: 185.85 LBS | SYSTOLIC BLOOD PRESSURE: 154 MMHG | OXYGEN SATURATION: 94 % | RESPIRATION RATE: 20 BRPM | TEMPERATURE: 97 F | HEIGHT: 69 IN | BODY MASS INDEX: 27.53 KG/M2 | DIASTOLIC BLOOD PRESSURE: 82 MMHG

## 2020-06-09 LAB
ANION GAP SERPL CALCULATED.3IONS-SCNC: 9 MMOL/L (ref 3–14)
BUN SERPL-MCNC: 14 MG/DL (ref 7–30)
CALCIUM SERPL-MCNC: 7.6 MG/DL (ref 8.5–10.1)
CHLORIDE SERPL-SCNC: 95 MMOL/L (ref 94–109)
CO2 SERPL-SCNC: 23 MMOL/L (ref 20–32)
CREAT SERPL-MCNC: 1.06 MG/DL (ref 0.66–1.25)
GFR SERPL CREATININE-BSD FRML MDRD: 64 ML/MIN/{1.73_M2}
GLUCOSE SERPL-MCNC: 112 MG/DL (ref 70–99)
POTASSIUM SERPL-SCNC: 3.9 MMOL/L (ref 3.4–5.3)
SARS-COV-2 RNA SPEC QL NAA+PROBE: NOT DETECTED
SODIUM SERPL-SCNC: 127 MMOL/L (ref 133–144)
SPECIMEN SOURCE: NORMAL
WBC # BLD AUTO: 9.6 10E9/L (ref 4–11)

## 2020-06-09 PROCEDURE — 99239 HOSP IP/OBS DSCHRG MGMT >30: CPT | Performed by: FAMILY MEDICINE

## 2020-06-09 PROCEDURE — 40000893 ZZH STATISTIC PT IP EVAL DEFER: Performed by: PHYSICAL THERAPIST

## 2020-06-09 PROCEDURE — 25000132 ZZH RX MED GY IP 250 OP 250 PS 637: Performed by: FAMILY MEDICINE

## 2020-06-09 PROCEDURE — 85048 AUTOMATED LEUKOCYTE COUNT: CPT | Performed by: FAMILY MEDICINE

## 2020-06-09 PROCEDURE — 36415 COLL VENOUS BLD VENIPUNCTURE: CPT | Performed by: FAMILY MEDICINE

## 2020-06-09 PROCEDURE — 80048 BASIC METABOLIC PNL TOTAL CA: CPT | Performed by: FAMILY MEDICINE

## 2020-06-09 RX ORDER — LEVOFLOXACIN 500 MG/1
500 TABLET, FILM COATED ORAL DAILY
DISCHARGE
Start: 2020-06-10 | End: 2020-06-20

## 2020-06-09 RX ORDER — ONDANSETRON 4 MG/1
4 TABLET, ORALLY DISINTEGRATING ORAL EVERY 8 HOURS PRN
Qty: 10 TABLET | Refills: 1 | DISCHARGE
Start: 2020-06-09

## 2020-06-09 RX ORDER — FLUTICASONE PROPIONATE 50 MCG
1 SPRAY, SUSPENSION (ML) NASAL DAILY
DISCHARGE
Start: 2020-06-09

## 2020-06-09 RX ORDER — ASPIRIN 325 MG
325 TABLET, DELAYED RELEASE (ENTERIC COATED) ORAL 2 TIMES DAILY WITH MEALS
Qty: 84 TABLET | Refills: 0 | DISCHARGE
Start: 2020-06-09

## 2020-06-09 RX ORDER — ALBUTEROL SULFATE 0.83 MG/ML
2.5 SOLUTION RESPIRATORY (INHALATION) EVERY 4 HOURS PRN
DISCHARGE
Start: 2020-06-09

## 2020-06-09 RX ORDER — NICOTINE 21 MG/24HR
1 PATCH, TRANSDERMAL 24 HOURS TRANSDERMAL DAILY
DISCHARGE
Start: 2020-06-09

## 2020-06-09 RX ORDER — POLYETHYLENE GLYCOL 3350 17 G/17G
17 POWDER, FOR SOLUTION ORAL DAILY PRN
Qty: 45 PACKET | Refills: 1 | DISCHARGE
Start: 2020-06-09

## 2020-06-09 RX ORDER — TAMSULOSIN HYDROCHLORIDE 0.4 MG/1
0.4 CAPSULE ORAL AT BEDTIME
DISCHARGE
Start: 2020-06-09

## 2020-06-09 RX ORDER — BUSPIRONE HYDROCHLORIDE 5 MG/1
5 TABLET ORAL 2 TIMES DAILY
Qty: 180 TABLET | Refills: 1 | DISCHARGE
Start: 2020-06-09

## 2020-06-09 RX ORDER — TIMOLOL MALEATE 5 MG/ML
2 SOLUTION/ DROPS OPHTHALMIC 2 TIMES DAILY
DISCHARGE
Start: 2020-06-09

## 2020-06-09 RX ORDER — MULTIPLE VITAMINS W/ MINERALS TAB 9MG-400MCG
1 TAB ORAL DAILY
DISCHARGE
Start: 2020-06-09

## 2020-06-09 RX ORDER — ACETAMINOPHEN 325 MG/1
650 TABLET ORAL EVERY 4 HOURS PRN
DISCHARGE
Start: 2020-06-09

## 2020-06-09 RX ADMIN — NICOTINE 1 PATCH: 21 PATCH TRANSDERMAL at 08:45

## 2020-06-09 RX ADMIN — ASPIRIN 325 MG: 325 TABLET, COATED ORAL at 08:39

## 2020-06-09 RX ADMIN — OMEPRAZOLE 20 MG: 20 CAPSULE, DELAYED RELEASE ORAL at 05:49

## 2020-06-09 RX ADMIN — BUSPIRONE HYDROCHLORIDE 5 MG: 5 TABLET ORAL at 08:39

## 2020-06-09 RX ADMIN — TIMOLOL MALEATE 2 DROP: 5 SOLUTION/ DROPS OPHTHALMIC at 08:45

## 2020-06-09 RX ADMIN — LEVOFLOXACIN 500 MG: 500 TABLET, FILM COATED ORAL at 08:39

## 2020-06-09 ASSESSMENT — MIFFLIN-ST. JEOR: SCORE: 1518.38

## 2020-06-09 ASSESSMENT — ACTIVITIES OF DAILY LIVING (ADL)
ADLS_ACUITY_SCORE: 22

## 2020-06-09 NOTE — PLAN OF CARE
Major shift events: switched  to oral levaquin  Neuro: alert and oriented, high anxiety due to not being able to go home. Frustrated with spouse over decision making  Pulmonary: lung sounds clear/diminished throughout  CV: NSR  GI: large bowel movement  : henley catheter changed. Good urine output  Skin: intact , scattered bruises  Lines/Tubes/Drains: peripheral iv x1      Plan: discharge tomorrow to TCU

## 2020-06-09 NOTE — PROGRESS NOTES
Jerry Rodriguez  Gender: male  : 1935  [address]  [facility] [address]  [phone] (home)     Medical Record: [id number]  Pharmacy: [facility]  - [place], [place] - [address] [facility]  Primary Care Provider: Roberta Beaver    Parent's names are: Data Unavailable (mother) and Data Unavailable (father).      [facility]  2020     Discharge Phone Call:  Discharge to [facility]

## 2020-06-09 NOTE — DISCHARGE INSTRUCTIONS
For Urology appointment, the clinic is aware of calling the [facility] and will be calling.  Please note they may call 15 minutes prior or later then 11:15am time.   They will be calling this [phone]  Any questions please call [phone]

## 2020-06-09 NOTE — PLAN OF CARE
Occupational Therapy Discharge Summary    Reason for therapy discharge:    Discharged to transitional care facility.    Progress towards therapy goal(s). See goals on Care Plan in [facility] electronic health record for goal details.  Goals partially met.  Barriers to achieving goals:   limited tolerance for therapy and discharge from facility.    Therapy recommendation(s):    Continued therapy is recommended.  Rationale/Recommendations:  to increase independence with ADL and progress toward PLOF.     Antonia Lamb OTR/L  [facility]  [phone]

## 2020-06-09 NOTE — DISCHARGE SUMMARY
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  Follow up via telephone visit with Dr. Mckeon, urology on 6/12/2020 as   scheduled           Discharge Disposition   Discharged to [facility]  Condition at discharge: Stable    Hospital Course    Patient is an 85-year-old gentleman with a past medical history of recent hip surgery repair following fracture, chronic hyponatremia (normally mild in the upper 120 to low 130 range), and BPH with recent worsening retention despite Flomax initiation who has been staying at [facility] following his surgical repair and during which stay a Streeter catheter was inserted for retention, with trial of voiding failed even after initiation of Flomax and Streeter was reinserted on 6/3.  He presents with sudden onset of fever, shortness of breath and occasional cough.  In the emergency room he was found to be mildly hypoxemic and required O2 supplementation with white blood cell count minimally elevated at 11,600.  Sodium was low at 121.  Lactic acid was unremarkable.  UA was abnormal with large amounts of white blood cells present, concerning for catheter associated UTI.  Of note, the same concern had been occurring at the TCU environment but patient had not yet started the Bactrim that had been prescribed.  Decision was  made to admit patient, placed on Rocephin for possible UTI, perform COVID testing and monitor patient closely.      Since admission his COVID testing has returned negative x2  On evening of admission he had the start of a rash on his abdomen and flank which by the morning of 6/6/2020 was a diffuse papular rash noted throughout his body.  Rocephin has been discontinued and IV Levaquin started on 6/6/2020.  Procalcitonin was initially low risk for systemic infection but repeat has increased to 0.43 on redraw on 6/6/2020 - retesting since has been trending downward appropriately.  Patient has now been afebrile for over 48 hours with other vitals stable. Urine culture is now growing out Enterbacter cloacae with suspectibilities showing Levaquin sensitivity.  Remainder of cultures show no growth to date.  Patient is discharged back to [facility] in stable condition.      Principal Problem:     Sepsis; Febrile illness    Assessment: secondary to catheter associated urinary tract infection with Enterbacter cloacae present on culture.  COVID testing x2 negative and chest x-ray negative for any signs of pneumonia.  Procalcitonin increasing in first 24 hours of hospitalization but subsequently decreasing.  Patient initially placed on Rocephin but transitioned to IV Levaquin on 6/6/2020 after rash development.   Sepsis resolved prior to discharge    Plan: Discharge with ongoing PO Levaquin, Streeter catheter has been changed prior to discharge.    Active Problems:    Urinary tract infection associated with indwelling urethral catheter (H)    Assessment:  Urine culture growing enterobacter cloacae with sensitivities known and sensitive to Levaquin being given    Plan: Discharge with ongoing PO Levaquin, Streeter catheter has been changed prior to discharge      Suspected Covid-19 Virus Infection    Assessment: Increased concern initially in patient with fever, shortness of breath, cough in a patient without obvious pneumonia.   Cover testing x2 has been negative and respiratory symptoms resolved a sepsis resolved    Plan: No further investigation is needed at this time      Hypoxemia    Assessment: Noted in the TCU environment and continuing on evaluation in the emergency room, associated with fever, sepsis and cough as above.  Chest x-ray shows no pneumonia.  Cover testing x2 is negative.  Now suspected likely secondary to sepsis as above    Plan: Discharge with ongoing Levaquin as above and monitor for ongoing resolution at the TCU environment.      Hyponatremia    Assessment: Present at baseline with sodiums normally in the upper 120 to low 130 range. Patient reports he has not liked fluid restrictions or sodium tablets that have been given to him in the past and normally feels fine when his sodium is in the upper 120 range. Sodium was 121 on presentation to the emergency room.  Had been receiving a diuretic recently which could contribute.  Sodium has now stabilized in the upper 120 range prior to discharge    Plan: We will discharge back to the TCU without diuretic therapy and ongoing routine monitoring      Rash      Assessment: Developing since admission and first noted by the admission nurse on arrival to the hospital floor but had not been noted by either provider or any nurses in the emergency room prior to transfer.  Initially was only involving the abdomen and the lower back region but progressed to encompass the majority of his body.  No itching or hives.  Rash is papular in nature but no pustular lesions are present.  Although the timing would be atypical for a drug reaction rash to Rocephin, as this is the patient's only new medication it was discontinue Rocephin and patient transition to IV Levaquin for ongoing treatment of suspected UTI as above.  Given febrile illness of unclear etiology, may also be a viral exanthem.  Has been stable in the past 2 days of hospitalization    Plan:   No further treatment is needed at this  time, monitor for ongoing resolution at the TCU environment.      BPH with urinary retention    Assessment: BPH present at baseline with patient having increasing issues with retention in recent weeks despite Flomax being initiated.  Patient now has Streeter catheter in place with plans for outpatient urology follow-up    Plan: Streeter has been replaced during this hospital stay due to UTI as above, patient does have an outpatient urology follow-up appointment in 3 days to establish care and discuss options going forward.      Tobacco use disorder    Assessment: Chronic and stable, on nicotine replacement    Plan: Continue with nicotine patch at time of discharge to TCU      History of repair of hip fracture    Assessment: Patient status post hip fracture repair in May 2020 at this facility, has been recovering well and currently is at a TCU environment for ongoing strengthening    Plan: Discharge back to TCU      Consultations This Hospital Stay   CARE TRANSITION RN/SW IP CONSULT  PHYSICAL THERAPY ADULT IP CONSULT  OCCUPATIONAL THERAPY ADULT IP CONSULT  PHYSICAL THERAPY ADULT IP CONSULT  OCCUPATIONAL THERAPY ADULT IP CONSULT    Code Status   Full Code    Time Spent on this Encounter   I, Amara Doss MD, personally saw the patient today and spent greater than 30 minutes discharging this patient.       Amara Doss MD  [facility]  ______________________________________________________________________    Physical Exam   Vital Signs: Temp: 97  F (36.1  C) Temp src: Oral BP: (!) 154/82 Pulse: 76 Heart Rate: 76 Resp: 20 SpO2: 94 % O2 Device: None (Room air)    Weight: 185 lbs 13.56 oz  Constitutional: awake, alert, cooperative, no apparent distress, very talkative today and appears stated age  Respiratory: No increased work of breathing, good air exchange, clear to auscultation bilaterally, no crackles or wheezing  Cardiovascular: Normal apical impulse, regular rate  and rhythm  GI: Bowel sounds present, abdomen soft and nontender  Skin: Patient continues to have a papular rash present diffusely throughout his body, with lower legs now having a darker red appearance to them and overall unchanged from yesterday  Musculoskeletal: there is no redness, warmth, or swelling of the joints  Neurologic: Awake, alert, oriented to person, place, year, and overall to situation but patient at times does not appear to fully grasp his current situation       Primary Care Physician   Roberta Beaver MD    Discharge Orders      UROLOGY ADULT REFERRAL      General info for SNF    Length of Stay Estimate: Short Term Care: Estimated # of Days <30  Condition at Discharge: Improving  Level of care:skilled   Rehabilitation Potential: Good  Admission H&P remains valid and up-to-date: Yes  Recent Chemotherapy: N/A  Use Nursing Home Standing Orders: Yes     Mantoux instructions    Give two-step Mantoux (PPD) Per Facility Policy Yes     Reason for your hospital stay    Sepsis caused by henley catheter associated urinary tract infection     Henley catheter    To straight gravity drainage until urology follow up appointment     Follow Up and recommended labs and tests    Follow up with Nursing home physician.  Follow up via telephone visit with Dr. Mckeon, urology on 6/12/2020 as scheduled     Activity - Up with nursing assistance     Additional Discharge Instructions    Omeprazole should be given 30-60 minutes before his meals, patient would appreciate his other medications given in smaller amounts throughout the morning (not all at once).     Activity - Ambulate in hallway    Every shift  In the era of COVID restrictions, attempt to find another solution to allow for ambulation in the room or other isolated location if hallway walking is not currently allowed.     Physical Therapy Adult Consult    Evaluate and treat as clinically indicated.    Reason:  Hip fractures s/p repair, sepsis caused by henley associated  UTI, generalized weakness     Occupational Therapy Adult Consult    Evaluate and treat as clinically indicated.    Reason:  Hip fractures s/p repair, sepsis caused by henley associated UTI, generalized weakness     Advance Diet as Tolerated    Follow this diet upon discharge: Regular     Discharge Medications   Current Discharge Medication List      START taking these medications    Details   levofloxacin (LEVAQUIN) 500 MG tablet Take 1 tablet (500 mg) by mouth daily for 10 days    Associated Diagnoses: Urinary tract infection associated with indwelling urethral catheter, initial encounter (H)         CONTINUE these medications which have CHANGED    Details   acetaminophen (TYLENOL) 325 MG tablet Take 2 tablets (650 mg) by mouth every 4 hours as needed for mild pain  Qty:      Associated Diagnoses: S/P hip hemiarthroplasty      albuterol (PROVENTIL) (2.5 MG/3ML) 0.083% neb solution Take 1 vial (2.5 mg) by nebulization every 4 hours as needed for wheezing  Qty:      Associated Diagnoses: Wheezing; Simple chronic bronchitis (H)      aspirin (ASA) 325 MG EC tablet Take 1 tablet (325 mg) by mouth 2 times daily (with meals)  Qty: 84 tablet, Refills: 0    Associated Diagnoses: S/P hip hemiarthroplasty; Closed fracture of left hip, initial encounter (H)      busPIRone (BUSPAR) 5 MG tablet Take 1 tablet (5 mg) by mouth 2 times daily  Qty: 180 tablet, Refills: 1    Associated Diagnoses: Anxiety state      fluticasone (FLONASE) 50 MCG/ACT nasal spray Spray 1 spray into both nostrils daily  Qty:      Associated Diagnoses: Chronic rhinitis      fluticasone-vilanterol (BREO ELLIPTA) 100-25 MCG/INH inhaler Inhale 1 puff into the lungs daily  Qty:      Associated Diagnoses: Wheezing; Simple chronic bronchitis (H)      multivitamin w/minerals (MULTI-VITAMIN) tablet Take 1 tablet by mouth daily  Qty:      Associated Diagnoses: Elevated blood pressure reading without diagnosis of hypertension      nicotine (NICODERM CQ) 21 MG/24HR 24  hr patch Place 1 patch onto the skin daily  Qty:      Associated Diagnoses: Tobacco use disorder      omeprazole (PRILOSEC) 20 MG DR capsule Take 1 capsule (20 mg) by mouth 2 times daily (before meals)  Qty:      Associated Diagnoses: Gastroesophageal reflux disease without esophagitis; Stricture and stenosis of esophagus      ondansetron (ZOFRAN-ODT) 4 MG ODT tab Take 1 tablet (4 mg) by mouth every 8 hours as needed for nausea or vomiting  Qty: 10 tablet, Refills: 1    Associated Diagnoses: Gastroesophageal reflux disease without esophagitis; Stricture and stenosis of esophagus      polyethylene glycol (MIRALAX) 17 g packet Take 17 g by mouth daily as needed for constipation (if no stool the day prior)  Qty: 45 packet, Refills: 1    Associated Diagnoses: S/P hip hemiarthroplasty; Closed fracture of left hip, initial encounter (H)      tamsulosin (FLOMAX) 0.4 MG capsule Take 1 capsule (0.4 mg) by mouth At Bedtime  Qty:      Associated Diagnoses: Benign prostatic hyperplasia with urinary retention      timolol maleate (TIMOPTIC) 0.5 % ophthalmic solution Place 2 drops into both eyes 2 times daily    Associated Diagnoses: Glaucoma, unspecified glaucoma type, unspecified laterality         CONTINUE these medications which have NOT CHANGED    Details   Spacer/Aero-Holding Chambers (VALVED HOLDING CHAMBER) JULISSA 1 Device 2 times daily To be used with flovent  Qty: 1 each, Refills: 1    Associated Diagnoses: Simple chronic bronchitis (H)         STOP taking these medications       hydrochlorothiazide (HYDRODIURIL) 25 MG tablet Comments:   Reason for Stopping:         oxyCODONE (ROXICODONE) 5 MG tablet Comments:   Reason for Stopping:         potassium chloride (KLOR-CON) 20 MEQ packet Comments:   Reason for Stopping:         senna-docusate (SENOKOT-S/PERICOLACE) 8.6-50 MG tablet Comments:   Reason for Stopping:             Allergies   Allergies   Allergen Reactions     Rocephin [Ceftriaxone] Rash     Papular rash on  abdomen/flank area noted 2.5 hours after rocephin was given in the ED and worsened to involve his entire body over the next 12 hours during febrile illness of unclear cause.  Timing is atypical for allergic reaction but antibiotic changed for hospital stay.

## 2020-06-09 NOTE — PLAN OF CARE
S-(situation): Patient discharged to [facility] via w/c with handivan    B-(background): low Na, fever, recent hip fx and repair.     A-(assessment): afebrile.  Na-127.  Covid negative.    Last bowel movement: today, 6/9/2020     R-(recommendations):Report called to [facility]. Listed belongings gathered and sent with patient.     Discharge Nursing Criteria:     Care Plan and Patient education resolved: Yes    Vaccines  Influenza status verified at discharge:  Yes    Intentionally Retained Items (examples: Drains, Wound packing, ureteral stents, henley, PICC line or IV)  Patient was sent to Nursing Home with Henley left in place. Pt has a virtual follow up with Dr Mckeon  Plan made for removal: appt with sumit    [facility]  Home and hospital aquired medications returned to patient: NA  Medication Bin checked and emptied on discharge Yes  All paperwork sent with patient/Copy of AVS given to patient or family Yes.

## 2020-06-09 NOTE — PLAN OF CARE
S-(situation): Physical therapy treatment per plan of care    B-(background): Patient is a 85 year old male, admitted 6/5/20 with febrile illness, UTI, hypoxemia and a history of hip fracture repair.     A-(assessment): Patient discharged to TCU prior to scheduled PT treatment.    R-(recommendations): Discharge inpatient plan of care. Resume skilled PT intervention at TCU. Patient would benefit from continued skilled therapeutic intervention in order to progress them towards a higher level of function in accordance with their surgeon's protocol.    Physical Therapy Discharge Summary    Reason for therapy discharge:    Discharged to transitional care facility.    Progress towards therapy goal(s). See goals on Care Plan in [facility] electronic health record for goal details.  Goals partially met.  Barriers to achieving goals:   limited tolerance for therapy and discharge from facility.    Therapy recommendation(s):    Continued therapy is recommended.  Rationale/Recommendations:  see above.     Thank you for your referral.  Fariba Boland, PT, DPT, ATC    [facility]  O: [phone]  E: [email or web address]

## 2020-06-09 NOTE — PLAN OF CARE
Max temp of 99.8. denies pain. Lungs CTA, 95% RA. Urine output of 550ml last pramod, 300 tonight, dark kehinde. Slept well between cares. Will continue with POC.

## 2020-06-09 NOTE — PROGRESS NOTES
Name: Jerry Rodriguez    Admit Date and Time: 6/5/2020  5:34 PM    MRN#: [id number]    Reason for Hospitalization: Hyponatremia [E87.1]  Hypoxia [R09.02]  Febrile illness [R50.9]  Urinary tract infection in male [N39.0]  Suspected 2019 Novel Coronavirus Infection [Z20.828]    Discharge Date: 6/9/2020    Patient / Family response to discharge plan: in agreement    Other Providers (Care Coordinator, County Services, PCA services etc): No    CTS Hand Off Completed: Yes    PAS #: N/A - Return to TCU    DARREN Score: Elevated    Future Appointments:   Future Appointments   Date Time Provider Department Center   6/9/2020  2:30 PM Fariba Boland, PT [facility]   6/12/2020 11:15 AM Anton Mckeon MD [facility]       Discharge Disposition: transitional care unit - return to [facility] (Admissions: [phone], Main Phone: [phone] Fax: [phone]).  No PAS needed.  Patient did not hold his bed, however, he will re-admit to same facility.  [facility] arranged for transport at 1400.  Billing for transport to [facility].    Reviewed discharge IMM with [facility], by phone, and emailed her a copy and copy given to patient.    Discharge Planner   Discharge Plans in progress: TCU   Barriers to discharge plan: None  Follow up plan: PCP and Urology    GARCÍA Arthur  [facility] [phone]/ [place] [phone]         Entered by: Melissa Shaikh 06/09/2020 12:30 PM

## 2020-06-10 ENCOUNTER — PATIENT OUTREACH (OUTPATIENT)
Dept: CARE COORDINATION | Facility: CLINIC | Age: 85
End: 2020-06-10

## 2020-06-10 DIAGNOSIS — N39.0 URINARY TRACT INFECTION ASSOCIATED WITH INDWELLING URETHRAL CATHETER (H): Primary | ICD-10-CM

## 2020-06-10 DIAGNOSIS — T83.511A URINARY TRACT INFECTION ASSOCIATED WITH INDWELLING URETHRAL CATHETER (H): Primary | ICD-10-CM

## 2020-06-10 NOTE — PROGRESS NOTES
Clinic Care Coordination Contact  Care Coordination Transition Communication         Clinical Data: Patient was hospitalized at [facility]  Reason for Hospitalization:    Hyponatremia [E87.1]  Hypoxia [R09.02]  Febrile illness [R50.9]  Urinary tract infection in male [N39.0]  Suspected 2019 Novel Coronavirus Infection [Z20.828]  Admit Date/Time: 6/5/2020  5:34 PM  Discharge Date: 6/9/20    Transition to Facility:              Facility Name: [facility] [phone]              Contact name and phone number/fax: Carmen CARNES [phone]    Plan: RN/SW Care Coordinator will await notification from facility staff informing RN/SW Care Coordinator of patient's discharge plans/needs. RN/SW Care Coordinator will review chart and outreach to facility staff every 4 weeks and as needed.     ROD ValeraN RN Clinic Care Coordinator   [place], [place], [place]  Phone: [phone]

## 2020-06-11 LAB
BACTERIA SPEC CULT: NO GROWTH
BACTERIA SPEC CULT: NO GROWTH
Lab: NORMAL
SPECIMEN SOURCE: NORMAL
SPECIMEN SOURCE: NORMAL

## 2020-06-13 LAB
BACTERIA SPEC CULT: NO GROWTH
BACTERIA SPEC CULT: NO GROWTH
SPECIMEN SOURCE: NORMAL
SPECIMEN SOURCE: NORMAL

## 2020-06-23 ENCOUNTER — TELEPHONE (OUTPATIENT)
Dept: UROLOGY | Facility: CLINIC | Age: 85
End: 2020-06-23

## 2020-06-23 NOTE — TELEPHONE ENCOUNTER
Reason for call:  Other   Patient called regarding (reason for call): call back  Additional comments: Jayne harvey nurse from [facility] is calling on behalf of the patient stating that they were told the appointment with Dr. Mckeon was at 11:15 today NOT 9:15. They would like to discuss thank you. Please call Jayne @ [phone]    Phone number to reach patient:  Home number on file [phone] (home)    Best Time:  any    Can we leave a detailed message on this number?  YES    Travel screening: Negative

## 2020-06-29 ENCOUNTER — TELEPHONE (OUTPATIENT)
Dept: UROLOGY | Facility: CLINIC | Age: 85
End: 2020-06-29

## 2020-06-29 NOTE — TELEPHONE ENCOUNTER
Received message from Dr. Reynolds and ghazala to add patient on for an in person visit on 6/30 or 7/2. Will plan to offer 7/2/20 at 9:30am or 10:30am. Attempted to reach Jayne at [facility] but Jayne who is out of the office for the rest of the day. Left message with request for [facility] to return call to clinic.     Meg Wen RN, BSN

## 2020-06-29 NOTE — TELEPHONE ENCOUNTER
NEW PATIENT TO [facility] for in clinic visit mid July but wants to be seen sooner...advised we would do a virtual visit but was told that they tried that w Dr. Mckeon also and they canceled and were told that it had to be in person (per [facility]). Advised since protocols here in [place] are virtual visit that clinic would need to advise, per protocols.    [facility] calling on behalf of patient. Unfortunately, center couldn't confirm even who placed the catheter (hospital or the care center).    Stated patient has a catheter, they went to change it multiple times but always end up putting back in...

## 2020-06-29 NOTE — TELEPHONE ENCOUNTER
Message sent to Dr. Reynolds with request to review and advise if okay to see this patient in clinic for a new patient appointment.    Meg Wen RN, BSN

## 2020-06-30 NOTE — TELEPHONE ENCOUNTER
Received call from Jayne at [facility]. In-person appointment with Dr. Reynolds scheduled for 7/2/20 at 10:30am. Address provided and Jayne was welcomed to call with any questions.    Meg Wen RN, BSN

## 2020-07-02 ENCOUNTER — OFFICE VISIT (OUTPATIENT)
Dept: UROLOGY | Facility: CLINIC | Age: 85
End: 2020-07-02
Payer: COMMERCIAL

## 2020-07-02 VITALS
SYSTOLIC BLOOD PRESSURE: 126 MMHG | HEART RATE: 70 BPM | OXYGEN SATURATION: 98 % | DIASTOLIC BLOOD PRESSURE: 72 MMHG | HEIGHT: 69 IN | BODY MASS INDEX: 27.4 KG/M2 | WEIGHT: 185 LBS

## 2020-07-02 DIAGNOSIS — R33.9 URINARY RETENTION: Primary | ICD-10-CM

## 2020-07-02 PROCEDURE — 99203 OFFICE O/P NEW LOW 30 MIN: CPT | Performed by: UROLOGY

## 2020-07-02 ASSESSMENT — MIFFLIN-ST. JEOR: SCORE: 1514.53

## 2020-07-02 NOTE — PROGRESS NOTES
Urology Consult History and Physical  [facility]  Name: Jerry Rodriguez    MRN: [id number]   YOB: 1935       We were asked to see Jerry Rodriguez at the request of Hospital follow up for evaluation and treatment of acute urinary retention.        Chief Complaint:   Acute urinary retention     History is obtained from the patient and the medical record            History of Present Illness:   Jerry Rodriguez is a 85 year old male who is being seen for evaluation of acute urinary retention.     He has significant past medical history including a recent hip fracture which required surgical repair chronic hyponatremia, and history of BPH with LUTS who has been in urinary retention since his hip fracture and surgical repair.  He is now being managed with an indwelling Streeter catheter and presents for further discussion of this.  He had a recent episode of a urinary tract infection which required hospitalization at [facility] from 6/5/2020 to 6/9/2020.    He is currently still living at a TCU and undergoing rehab from his hip fracture.  He is not overly bothered by the catheter.  He has been on tamsulosin 0.4 mg for over a month.    UCx:  6/5/20    >100k Enterobacter             Past Medical History:     Past Medical History:   Diagnosis Date     Anxiety state, unspecified      Arthritis      Hypertension      Hyponatremia      Inguinal hernia with obstruction, without mention of gangrene, unilateral or unspecified, (not specified as recurrent)     Strangulated right inguinal hernia years ago     Inguinal hernia without mention of obstruction or gangrene, unilateral or unspecified, (not specified as recurrent) 11/07/2000    Left inguinal hernia, sliding and not incarcerated     Other joint derangement, not elsewhere classified, forearm 1980    traumatic fracture     Pathologic fracture of neck of femur (H)     Surgery for hip fracture     Stricture and stenosis of  esophagus 2004            Past Surgical History:     Past Surgical History:   Procedure Laterality Date     C OSTEOTOMY HIP/FEMUR  1980    traumatic fracture right hip     COLONOSCOPY  10/17/2007    Repeat Colonoscopy in 5 years for surveillance.     COLONOSCOPY N/A 1/24/2018    Procedure: COLONOSCOPY;;  Surgeon: Don Arambula MD;  Location: [facility]     ESOPHAGOSCOPY, GASTROSCOPY, DUODENOSCOPY (EGD), COMBINED  1/4/2011    COMBINED ESOPHAGOSCOPY, GASTROSCOPY, DUODENOSCOPY (EGD), ESOPHAGEAL DILATION BALLOON LESS THAN 30MM performed by ELEN BUSBY at  GI     ESOPHAGOSCOPY, GASTROSCOPY, DUODENOSCOPY (EGD), COMBINED N/A 9/25/2015    Procedure: COMBINED ESOPHAGOSCOPY, GASTROSCOPY, DUODENOSCOPY (EGD), BIOPSY SINGLE OR MULTIPLE;  Surgeon: Markell Lawson MD;  Location: [facility]     ESOPHAGOSCOPY, GASTROSCOPY, DUODENOSCOPY (EGD), COMBINED N/A 1/24/2018    Procedure: COMBINED ESOPHAGOSCOPY, GASTROSCOPY, DUODENOSCOPY (EGD), BIOPSY SINGLE OR MULTIPLE;;  Surgeon: Don Arambula MD;  Location: [facility]     ESOPHAGOSCOPY, GASTROSCOPY, DUODENOSCOPY (EGD), DILATATION, COMBINED N/A 1/24/2018    Procedure: COMBINED ESOPHAGOSCOPY, GASTROSCOPY, DUODENOSCOPY (EGD), DILATATION;  Esophagogastroduodenoscopy with Dilatation and Biopsy by Biopsy, Colonoscopy;  Surgeon: Don Arambula MD;  Location: [facility]      COLONOSCOPY THRU STOMA, DIAGNOSTIC  11/15/2000    Recurrent abdominal pain     HC COLONOSCOPY W/WO BRUSH/WASH  07/19/2004     HC REPAIR INCISIONAL HERNIA,REDUCIBLE  11/10/2000    Hernia Repair, Incisional, Unilateral, Left inguinal hernia and umbilical hernia     HC UGI ENDOSCOPY DIAG W OR W/O BRUSH/WASH  07/19/2004     HC UGI ENDOSCOPY DIAG W OR W/O BRUSH/WASH  05/24/2005    Peptic stricture of esophagus, dilated to 18 mm. Erosive reflux esophagitis. Hiatal hernia. Erosive duodenitis.     HC UGI ENDOSCOPY, SIMPLE EXAM  02/20/07     HC UGI ENDOSCOPY, SIMPLE EXAM  12/02/08    Schiatzky's ring, dialated, PPI  indefinitely      UGI ENDOSCOPY, SIMPLE EXAM  12/15/2009    benign stricture with dialation     LAPAROSCOPIC APPENDECTOMY  09/15/2005     OPEN REDUCTION INTERNAL FIXATION HIP BIPOLAR Left 5/21/2020    Procedure: HEMIARTHROPLASTY, HIP, BIPOLAR;  Surgeon: Pola Rose MD;  Location:  OR            Social History:     Social History     Tobacco Use     Smoking status: Current Every Day Smoker     Packs/day: 1.00     Years: 52.00     Pack years: 52.00     Types: Cigarettes     Smokeless tobacco: Current User     Types: Chew     Tobacco comment: occasionally/ 1.5tin qwk   Substance Use Topics     Alcohol use: No     Comment: quit 7-1980       History   Smoking Status     Current Every Day Smoker     Packs/day: 1.00     Years: 52.00     Types: Cigarettes   Smokeless Tobacco     Current User     Types: Chew     Comment: occasionally/ 1.5tin qwk            Family History:     Family History   Problem Relation Age of Onset     Cancer Sister         brain tumor     Diabetes Sister      Alzheimer Disease Sister               Allergies:     Allergies   Allergen Reactions     Rocephin [Ceftriaxone] Rash     Papular rash on abdomen/flank area noted 2.5 hours after rocephin was given in the ED and worsened to involve his entire body over the next 12 hours during febrile illness of unclear cause.  Timing is atypical for allergic reaction but antibiotic changed for hospital stay.              Medications:     Current Outpatient Medications   Medication Sig     acetaminophen (TYLENOL) 325 MG tablet Take 2 tablets (650 mg) by mouth every 4 hours as needed for mild pain     albuterol (PROVENTIL) (2.5 MG/3ML) 0.083% neb solution Take 1 vial (2.5 mg) by nebulization every 4 hours as needed for wheezing     aspirin (ASA) 325 MG EC tablet Take 1 tablet (325 mg) by mouth 2 times daily (with meals)     busPIRone (BUSPAR) 5 MG tablet Take 1 tablet (5 mg) by mouth 2 times daily     fluticasone (FLONASE) 50 MCG/ACT nasal spray  "Spray 1 spray into both nostrils daily     fluticasone-vilanterol (BREO ELLIPTA) 100-25 MCG/INH inhaler Inhale 1 puff into the lungs daily     multivitamin w/minerals (MULTI-VITAMIN) tablet Take 1 tablet by mouth daily     nicotine (NICODERM CQ) 21 MG/24HR 24 hr patch Place 1 patch onto the skin daily     omeprazole (PRILOSEC) 20 MG DR capsule Take 1 capsule (20 mg) by mouth 2 times daily (before meals)     ondansetron (ZOFRAN-ODT) 4 MG ODT tab Take 1 tablet (4 mg) by mouth every 8 hours as needed for nausea or vomiting     polyethylene glycol (MIRALAX) 17 g packet Take 17 g by mouth daily as needed for constipation (if no stool the day prior)     Spacer/Aero-Holding Chambers (VALVED HOLDING CHAMBER) JULISSA 1 Device 2 times daily To be used with flovent     tamsulosin (FLOMAX) 0.4 MG capsule Take 1 capsule (0.4 mg) by mouth At Bedtime     timolol maleate (TIMOPTIC) 0.5 % ophthalmic solution Place 2 drops into both eyes 2 times daily     No current facility-administered medications for this visit.              Review of Systems:     Skin: negative  Eyes: negative  Ears/Nose/Throat: negative  Respiratory: No shortness of breath, dyspnea on exertion, cough, or hemoptysis  Cardiovascular: negative  Gastrointestinal: negative  Genitourinary: as above  Musculoskeletal: as above  Neurologic: as above  Psychiatric: negative  Hematologic/Lymphatic/Immunologic: negative  Endocrine: negative          Physical Exam:     Patient Vitals for the past 24 hrs:   BP Pulse SpO2 Height Weight   07/02/20 1016 126/72 70 98 % 1.753 m (5' 9\") 83.9 kg (185 lb)     Body mass index is 27.32 kg/m .     General: age-appropriate appearing male in NAD  HEENT: Head AT/NC, EOMI, CN Grossly intact  Lungs: no respiratory distress, or pursed lip breathing  Heart: No obvious jugular venous distension present  Back: no bony midline tenderness, no CVAT bilaterally.  Abdomen: soft, non-distended, non-tender. No organomegaly  Lymph: no palpable inguinal " lymphadenopathy.  Musculoskeltal: extremities normal, no peripheral edema  Skin: no suspicious lesions or rashes  Neuro: Alert, oriented, speech and mentation normal  Psych: affect and mood normal          Data:   All laboratory data reviewed:    UA RESULTS:  Recent Labs   Lab Test 06/05/20 1937  07/16/18  1110   COLOR Yellow   < > Yellow   APPEARANCE Slightly Cloudy   < > Clear   URINEGLC Negative   < > Negative   URINEBILI Negative   < > Negative   URINEKETONE Negative   < > Negative   SG 1.017   < > <=1.005   UBLD Large*   < > Negative   URINEPH 5.0   < > 7.0   PROTEIN 100*   < > Negative   UROBILINOGEN  --   --  0.2   NITRITE Positive*   < > Negative   LEUKEST Moderate*   < > Negative   RBCU >182*  --   --    WBCU 121*  --   --     < > = values in this interval not displayed.      PSA   Date Value Ref Range Status   12/14/2010 2.80 0 - 4 ug/L Final      Lab Results   Component Value Date    CR 1.06 06/09/2020             Impression and Plan:   Impression:   85-year-old man with history of BPH and LUTS known urinary retention following a hip fracture and surgical repair around 5/20/2020.      Plan:   Urinary retention  -I agree with continue tamsulosin 0.4 mg daily  -We discussed that post operative urinary retention is quite common, especially with his baseline BPH with LUTS  -We discussed that as his mobility continues to improve he may regain the ability to void spontaneously  -I recommend a repeat trial of void at the TCU this week or as he approaches discharge  -If he continues to fail trials of void, I would then recommend management with an indwelling Streeter catheter with q. monthly changes  -We discussed that further work-up for potential removal of the catheter would involve invasive testing such as office cystoscopy to evaluate for potential surgical or procedural options.  He is not interested in a procedure or surgery for potential obstruction, and therefore further work-up for this would be  unnecessary.  -If he requires prolonged Streeter catheterization, he may have monthly catheter changes with home health care nursing if available as he wishes to have this done closer to home.  -He may follow-up with us on a as needed basis     Thank you for the kind consultation.    Time spent: 30 minutes of which >50% was spent counseling.    Almas Reynolds MD   Urology  [facility]  [facility] Phone: [phone]  [facility] Phone: [phone]

## 2020-07-02 NOTE — PROGRESS NOTES
"Jerry Rodriguez's goals for this visit include:   Chief Complaint   Patient presents with     New Patient     discuss catheter       He requests these members of his care team be copied on today's visit information:     PCP: Roberta Beaver    Referring Provider:  No referring provider defined for this encounter.    /72 (BP Location: Left arm, Patient Position: Sitting, Cuff Size: Adult Regular)   Pulse 70   Ht 1.753 m (5' 9\")   Wt 83.9 kg (185 lb)   SpO2 98%   BMI 27.32 kg/m      Do you need any medication refills at today's visit? No      Halle Lynch LPN    "

## 2020-07-02 NOTE — Clinical Note
Spike Beaver,    I saw Tyler today in consultation for his urinary retention.  This developed following his hip fracture at the end of May and he has failed outpatient trial of voids at his TCU.  He recently had a catheter associated UTI.  He is on tamsulosin 0.4 mg, and we discussed that a repeat trial of void is reasonable as he regains his mobility.  We discussed the option for further work-up to consider bladder outlet procedures, however he is not interested in any procedure or surgery for his bladder outlet.  If he continues to fail trial of voids, we then discussed management with chronic indwelling Streeter catheter with q. monthly catheter changes which could be done through home health care.    Please let me know if you have any questions or concerns.  Thanks,   Almas Reynolds M.D.  Cell: [phone]

## 2020-07-09 ENCOUNTER — PATIENT OUTREACH (OUTPATIENT)
Dept: CARE COORDINATION | Facility: CLINIC | Age: 85
End: 2020-07-09

## 2020-07-09 NOTE — PROGRESS NOTES
Clinic Care Coordination Contact    Situation: Patient chart reviewed by care coordinator.    Background: patient is listed as a potential care coordination patient.     Assessment: patient has been participating in rehab at [facility] [phone]  Contact name and phone number/fax: Carmen CARNES [phone]    Plan: RN/SW Care Coordinator will await notification from facility staff informing RN/SW Care Coordinator of patient's discharge plans/needs. RN/SW Care Coordinator will review chart and outreach to facility staff every 4 weeks and as needed.      GERONIMO Valera RN Clinic Care Coordinator   [place], [place], [place]  Phone: [phone]

## 2020-07-20 ENCOUNTER — TELEPHONE (OUTPATIENT)
Dept: UROLOGY | Facility: CLINIC | Age: 85
End: 2020-07-20

## 2020-07-20 NOTE — TELEPHONE ENCOUNTER
[facility] Call Center    Phone Message    May a detailed message be left on voicemail: yes     Reason for Call: Other: Abilio from [facility] in [place] is calling regarding the orders from when patient saw Dr. Reynolds on July 2nd. Abilio states that the trial void did not work; 2 trials were failed. Patient currently has a full henley catheter. He would like a call back to discuss next steps since trying to remove catheter a few times has failed. Best number to reach him: [phone]     Action Taken: Message routed to:  Adult Clinics: Urology p [id number]

## 2020-07-20 NOTE — TELEPHONE ENCOUNTER
Per Dr. Reynolds, patient is to keep henley until patient passes TOV.     Called Abilio back and relayed this note. Abilio had no further questions.    Halle Lynch LPN

## 2020-08-12 ENCOUNTER — PATIENT OUTREACH (OUTPATIENT)
Dept: CARE COORDINATION | Facility: CLINIC | Age: 85
End: 2020-08-12

## 2020-08-12 NOTE — PROGRESS NOTES
Clinic Care Coordination Contact     Situation: Patient chart reviewed by care coordinator.     Background: patient is listed as a potential care coordination candidate.      Assessment: patient has been participating in rehab at [facility] [phone]  Contact name and phone number/fax: Carmen CARNES [phone]     Plan: RN Care Coordinator will await notification from facility staff informing RN/SW Care Coordinator of patient's discharge plans/needs. RN Care Coordinator will review chart and outreach to facility staff every 4 weeks and as needed.      GERONIMO Valera RN Clinic Care Coordinator   [place], [place], [place]  Phone: [phone]

## 2020-08-18 ENCOUNTER — PATIENT OUTREACH (OUTPATIENT)
Dept: CARE COORDINATION | Facility: CLINIC | Age: 85
End: 2020-08-18

## 2020-08-18 NOTE — PROGRESS NOTES
Clinic Care Coordination Contact    Situation: Patient chart reviewed by care coordinator.    Background: patient is a potential care coordination candidate.     Assessment: at last contact, CC RN left message with Carmen CARNES at [facility] asking for discharge planning information. Carmen called and left a detailed VM 8/14 stating that patient would be moving to [facility] assisted living facility in [place]. Patient will be getting all services offered such as AM and PM cares, toilet assist, OT, physical therapy, medication administration, meals, home making, and cleaning. Patients planned discharge date is 8/19.     Plan/Recommendations: CC RN will not reach out to patient based on level of services planned at discharge.      GERONIMO Valera RN Clinic Care Coordinator   [place], [place], [place]  Phone: [phone]

## 2021-10-10 ENCOUNTER — TRANSFERRED RECORDS (OUTPATIENT)
Dept: HEALTH INFORMATION MANAGEMENT | Facility: CLINIC | Age: 86
End: 2021-10-10

## 2021-10-10 LAB
ALT SERPL-CCNC: 12 U/L (ref 8–45)
AST SERPL-CCNC: 19 U/L (ref 5–41)
INR (EXTERNAL): 1 (ref 0.9–1.1)

## 2021-10-13 LAB
CREATININE (EXTERNAL): 1.45 MG/DL (ref 0.72–1.25)
GFR ESTIMATED (EXTERNAL): 43 ML/MIN/1.73M2
GLUCOSE (EXTERNAL): 89 MG/DL (ref 70–100)
POTASSIUM (EXTERNAL): 4 MMOL/L (ref 3.5–5.1)

## 2022-06-24 ENCOUNTER — TRANSFERRED RECORDS (OUTPATIENT)
Dept: HEALTH INFORMATION MANAGEMENT | Facility: CLINIC | Age: 87
End: 2022-06-24

## 2022-06-24 LAB
ALT SERPL-CCNC: 7 U/L (ref 8–45)
AST SERPL-CCNC: 13 U/L (ref 5–41)
CREATININE (EXTERNAL): 2.96 MG/DL (ref 0.72–1.25)
GFR ESTIMATED (EXTERNAL): 20 ML/MIN/1.73M2
GLUCOSE (EXTERNAL): 100 MG/DL (ref 70–105)
POTASSIUM (EXTERNAL): 4.6 MMOL/L (ref 3.5–5.1)
TSH SERPL-ACNC: 2.13 UIU/ML (ref 0.47–5)

## 2023-11-22 ENCOUNTER — TELEPHONE (OUTPATIENT)
Dept: FAMILY MEDICINE | Facility: OTHER | Age: 88
End: 2023-11-22
Payer: COMMERCIAL

## 2023-11-22 NOTE — TELEPHONE ENCOUNTER
Patient has not been seen by us since 2020. Please call [facility] care and advise they follow up with provider prescribing.       BHAVANA Walker CNP  Questions or concerns please feel free to send me a "[email or web address] message or call me  Phone : [phone]

## 2023-11-22 NOTE — TELEPHONE ENCOUNTER
INCOMING FORMS    Sender: [facility]    Type of Form, letter or note (What is requested?): order    How was the form received?: Fax    How should forms be returned?:  Fax : [phone]    Form placed in OOO bin for review/signature if appropriate.

## 2024-08-12 NOTE — NURSING NOTE
"Chief Complaint   Patient presents with     Heartburn     Panel Management     mychart, height, falkl risk, honoring choices, lipid, bmp, JAIMEE       Initial /76 (BP Location: Right arm, Patient Position: Chair, Cuff Size: Adult Regular)  Pulse 74  Temp 98.4  F (36.9  C) (Temporal)  Resp 18  Ht 5' 9.5\" (1.765 m)  Wt 199 lb (90.3 kg)  SpO2 98%  BMI 28.97 kg/m2 Estimated body mass index is 28.97 kg/(m^2) as calculated from the following:    Height as of this encounter: 5' 9.5\" (1.765 m).    Weight as of this encounter: 199 lb (90.3 kg).  Medication Reconciliation: complete  " 12-Aug-2024 21:13

## (undated) DEVICE — DRAPE SHEET REV FOLD 3/4 9349

## (undated) DEVICE — GLOVE PROTEXIS W/NEU-THERA 8.0  2D73TE80

## (undated) DEVICE — GLOVE PROTEXIS W/NEU-THERA 7.5  2D73TE75

## (undated) DEVICE — PACK TOTAL JOINT STD LATEX

## (undated) DEVICE — PAD FOAM MCGUIRE KIT 0814-0150

## (undated) DEVICE — DRAPE HIP LEG POCKETS CLR 29429

## (undated) DEVICE — SUCTION IRR SYSTEM W/O TIP INTERPULSE HANDPIECE 0210-100-000

## (undated) DEVICE — Device

## (undated) DEVICE — BONE CLEANING TIP INTERPULSE  0210-010-000

## (undated) DEVICE — SU VICRYL 2-0 CP-1 27" UND J266H

## (undated) DEVICE — HOOD FLYTE 0408-800-000

## (undated) DEVICE — DRAPE STERI U 1015

## (undated) DEVICE — STRAP STIRRUP W/SLIP 30187-030

## (undated) DEVICE — PREP CHLORAPREP 26ML TINTED ORANGE  260815

## (undated) DEVICE — ADH SKIN CLOSURE PREMIERPRO EXOFIN 1.0ML 3470

## (undated) DEVICE — IMM PILLOW ABDUCT HIP MED DM60-076EM

## (undated) DEVICE — DRSG AQUACEL AG 3.5X9.75" HYDROFIBER 412011

## (undated) DEVICE — BONE CEMENT MIXEVAC HI VAC W/CARTRIDGE 0306-563-000

## (undated) DEVICE — SU MONOCRYL 3-0 PS-2 18" UND Y497G

## (undated) DEVICE — DECANTER BAG 2002S

## (undated) DEVICE — SOL NACL 0.9% IRRIG 3000ML BAG 07972-08

## (undated) DEVICE — DRAPE IOBAN INCISE 36X23" 6651EZ

## (undated) DEVICE — BONE CLEANING TIP INTERPULSE FEMORAL CANAL 0210-008-000

## (undated) DEVICE — ESU GROUND PAD UNIVERSAL W/O CORD

## (undated) DEVICE — SU VICRYL 0 CP-1 27" UND J267H

## (undated) DEVICE — GLOVE ESTEEM BLUE W/NEU-THERA 8.0  2D73PB80

## (undated) RX ORDER — PROPOFOL 10 MG/ML
INJECTION, EMULSION INTRAVENOUS
Status: DISPENSED
Start: 2020-05-21

## (undated) RX ORDER — PHENYLEPHRINE HCL IN 0.9% NACL 1 MG/10 ML
SYRINGE (ML) INTRAVENOUS
Status: DISPENSED
Start: 2020-05-21

## (undated) RX ORDER — DEXAMETHASONE SODIUM PHOSPHATE 10 MG/ML
INJECTION, SOLUTION INTRAMUSCULAR; INTRAVENOUS
Status: DISPENSED
Start: 2020-05-21

## (undated) RX ORDER — ONDANSETRON 2 MG/ML
INJECTION INTRAMUSCULAR; INTRAVENOUS
Status: DISPENSED
Start: 2020-05-21

## (undated) RX ORDER — FENTANYL CITRATE 50 UG/ML
INJECTION, SOLUTION INTRAMUSCULAR; INTRAVENOUS
Status: DISPENSED
Start: 2020-05-21

## (undated) RX ORDER — FENTANYL CITRATE 50 UG/ML
INJECTION, SOLUTION INTRAMUSCULAR; INTRAVENOUS
Status: DISPENSED
Start: 2018-01-24

## (undated) RX ORDER — CEFAZOLIN SODIUM 1 G/3ML
INJECTION, POWDER, FOR SOLUTION INTRAMUSCULAR; INTRAVENOUS
Status: DISPENSED
Start: 2020-05-21